# Patient Record
Sex: FEMALE | Race: WHITE | HISPANIC OR LATINO | Employment: FULL TIME | URBAN - METROPOLITAN AREA
[De-identification: names, ages, dates, MRNs, and addresses within clinical notes are randomized per-mention and may not be internally consistent; named-entity substitution may affect disease eponyms.]

---

## 2017-01-20 ENCOUNTER — TRANSCRIBE ORDERS (OUTPATIENT)
Dept: ADMINISTRATIVE | Facility: HOSPITAL | Age: 47
End: 2017-01-20

## 2017-01-20 DIAGNOSIS — R93.89 ABNORMAL X-RAY: Primary | ICD-10-CM

## 2017-01-28 ENCOUNTER — HOSPITAL ENCOUNTER (OUTPATIENT)
Dept: CT IMAGING | Facility: HOSPITAL | Age: 47
Discharge: HOME | End: 2017-01-28
Payer: COMMERCIAL

## 2017-01-28 DIAGNOSIS — R93.89 ABNORMAL X-RAY: ICD-10-CM

## 2017-01-28 PROCEDURE — 71250 CT THORAX DX C-: CPT

## 2017-10-06 ENCOUNTER — HOSPITAL ENCOUNTER (OUTPATIENT)
Dept: ULTRASOUND IMAGING | Facility: HOSPITAL | Age: 47
Discharge: HOME | End: 2017-10-06
Attending: OBSTETRICS & GYNECOLOGY
Payer: COMMERCIAL

## 2017-10-06 DIAGNOSIS — N93.9 ABNORMAL UTERINE BLEEDING: ICD-10-CM

## 2017-10-06 PROCEDURE — 76830 TRANSVAGINAL US NON-OB: CPT

## 2017-10-06 PROCEDURE — 76856 US EXAM PELVIC COMPLETE: CPT

## 2017-10-11 ENCOUNTER — GENERIC CONVERSION - ENCOUNTER (OUTPATIENT)
Dept: OTHER | Facility: OTHER | Age: 47
End: 2017-10-11

## 2017-10-19 ENCOUNTER — GENERIC CONVERSION - ENCOUNTER (OUTPATIENT)
Dept: OTHER | Facility: OTHER | Age: 47
End: 2017-10-19

## 2017-10-23 ENCOUNTER — GENERIC CONVERSION - ENCOUNTER (OUTPATIENT)
Dept: OTHER | Facility: OTHER | Age: 47
End: 2017-10-23

## 2017-10-27 ENCOUNTER — GENERIC CONVERSION - ENCOUNTER (OUTPATIENT)
Dept: OTHER | Facility: OTHER | Age: 47
End: 2017-10-27

## 2018-01-10 NOTE — MISCELLANEOUS
Message   Recorded as Task   Date: 10/10/2017 09:57 PM, Created By: Karol Butterfield   Task Name: Follow Up   Assigned To: Karol Butterfield   Regarding Patient: Kvng Noguera, Status: In Progress   OusmaneTerjorge Mcmullen - 10 Oct 2017 9:57 PM     TASK CREATED  Patient had an ultrasound, and had  no shows for results  needs to be seen for someone to review her results with her   Marsha Dumont - 11 Oct 2017 3:50 PM     TASK IN PROGRESS   Marsha Dumont - 11 Oct 2017 3:52 PM     TASK REPLIED TO: Previously Assigned To [facility] [employer] Team  Attempted to call pt [phone] to schedule appt to review results of US, no answer  Left VM in [place]  Awaiting a call back  Marsha Dumont - 18 Oct 2017 11:55 AM     TASK REPLIED TO: Previously Assigned To [facility] [employer] Team  Again called pt and left VM in [place]  Awaiting a call back  Marsha Dumont - 23 Oct 2017 10:08 AM     TASK REPLIED TO: Previously Assigned To [facility] [employer] Team  Pt coming in for f/u appt to discuss US results on 10/27/17 at 08:30 and annual scheduled for 11/2/17 at 2:15  Active Problems    1  Abnormal uterine bleeding (626 9) (N93 9)   2  Anxiety (300 00) (F41 9)   3  Chest pain (786 50) (R07 9)   4  Constipation (564 00) (K59 00)   5  Dysuria (788 1) (R30 0)   6  Encounter for routine gynecological examination (V72 31) (Z01 419)   7  Encounter for screening mammogram for malignant neoplasm of breast (V76 12)   (Z12 31)   8  Esophageal reflux (530 81) (K21 9)   9  Fibroid, uterine (218 9) (D25 9)   10  Menorrhagia (626 2) (N92 0)   11  Obesity (278 00) (E66 9)   12  Screen for STD (sexually transmitted disease) (V74 5) (Z11 3)   13  FRANKLIN (stress urinary incontinence, female) (625 6) (N39 3)   14  Vaginal candidiasis (112 1) (B37 3)   15  Well adult on routine health check (V70 0) (Z00 00)    Current Meds   1  Fluconazole 150 MG Oral Tablet; TAKE 1 TABLET NOW, AND REPEAT IN 4 DAYS;    Therapy: 84TTH5776 to (Evaluate:72Run1088) Requested for: 94UXV8103; Last   Rx:[id number] Ordered   2  Low-Ogestrel 0 3-30 MG-MCG Oral Tablet; TAKE 1 TABLET DAILY; Therapy: 05STA0854 to (Evaluate:94Evh5641)  Requested for: 72WND5553; Last   Rx:56Vml6695 Ordered   3  Vitron C 125 MG TABS; Therapy: (Recorded:57Gtl1299) to Recorded    Allergies    1  Aspirin Buffered TABS    2  No Known Environmental Allergies   3   No Known Food Allergies    Signatures   Electronically signed by : Cristina Wolfe RN; Oct 23 2017 10:10AM EST                       (Author)

## 2018-01-11 NOTE — MISCELLANEOUS
Message  Attempted to call pt [phone] to schedule appt to review results of US, no answer  Left VM in Turkish  Awaiting a call back  Active Problems    1  Abnormal uterine bleeding (626 9) (N93 9)   2  Anxiety (300 00) (F41 9)   3  Chest pain (786 50) (R07 9)   4  Constipation (564 00) (K59 00)   5  Dysuria (788 1) (R30 0)   6  Encounter for routine gynecological examination (V72 31) (Z01 419)   7  Encounter for screening mammogram for malignant neoplasm of breast (V76 12)   (Z12 31)   8  Esophageal reflux (530 81) (K21 9)   9  Fibroid, uterine (218 9) (D25 9)   10  Menorrhagia (626 2) (N92 0)   11  Obesity (278 00) (E66 9)   12  Screen for STD (sexually transmitted disease) (V74 5) (Z11 3)   13  FRANKLIN (stress urinary incontinence, female) (625 6) (N39 3)   14  Vaginal candidiasis (112 1) (B37 3)   15  Well adult on routine health check (V70 0) (Z00 00)    Current Meds   1  Fluconazole 150 MG Oral Tablet; TAKE 1 TABLET NOW, AND REPEAT IN 4 DAYS; Therapy: 88PCO6574 to (Evaluate:59Vkw8608)  Requested for: 41DVE7711; Last   Rx:[id number] Ordered   2  Low-Ogestrel 0 3-30 MG-MCG Oral Tablet; TAKE 1 TABLET DAILY; Therapy: 61WEJ1530 to (Evaluate:38Enq5807)  Requested for: 56GNX0412; Last   Rx:64Rhs5685 Ordered   3  Vitron C 125 MG TABS; Therapy: (Recorded:70Kjb9659) to Recorded    Allergies    1  Aspirin Buffered TABS    2  No Known Environmental Allergies   3   No Known Food Allergies    Signatures   Electronically signed by : Kimmie Arthur RN; Oct 11 2017  3:52PM EST                       (Author)

## 2018-01-15 NOTE — RESULT NOTES
Verified Results  * US PELVIS COMPLETE (TRANSABDOMINAL AND TRANSVAGINAL) [id number] 01:25PM Dwana Frankel     Test Name Result Flag Reference   US PELVIS COMPLETE (TRANSABDOMINAL AND TRANSVAGINAL) (Report)     PELVIC ULTRASOUND, COMPLETE     INDICATION: Abnormal uterine bleeding, history of fibroids  COMPARISON: Pelvic ultrasound 9/3/2016     TECHNIQUE:  Transabdominal pelvic ultrasound was performed in sagittal and transverse planes with a curvilinear transducer  Additional transvaginal imaging was performed to better evaluate the endometrium and ovaries  Imaging included volumetric    sweeps as well as traditional still imaging technique  FINDINGS:     UTERUS:   The uterus is retroverted in position, measuring 8 6 x 5 9 x 6 6 cm  Contour and echotexture appear normal  1 8 x 0 9 x 2 0 cm intramural fundal fibroid  5 3 x 4 4 x 5 0 anterior, partially exophytic uterine body previously estimated at 4 2 x 4 2 x 4 6 cm  Part of this difference could be related to interobserver variability  The cervix shows no suspicious abnormality  7 mm complex nabothian cyst or fibroid noted in the lower uterine segment, former favored  ENDOMETRIUM:    Normal caliber of 9 mm  Homogenous and normal in appearance  OVARIES/ADNEXA:   Right ovary: 3 0 x 2 4 x 2 6 cm  Probable corpus luteum  Doppler flow within normal limits  Left ovary: 1 8 x 1 7 x 1 9 cm  No suspicious left ovarian abnormality  Doppler flow within normal limits  No suspicious adnexal mass or loculated collections  There is no free fluid  IMPRESSION:      Uterine fibroids as above  Probable right ovarian corpus luteum            Workstation performed: [id number]     Signed by:   Nohelia Owusu DO   10/10/17

## 2018-01-22 VITALS
DIASTOLIC BLOOD PRESSURE: 79 MMHG | SYSTOLIC BLOOD PRESSURE: 114 MMHG | BODY MASS INDEX: 35.42 KG/M2 | WEIGHT: 181.38 LBS

## 2018-09-08 ENCOUNTER — APPOINTMENT (EMERGENCY)
Dept: RADIOLOGY | Facility: HOSPITAL | Age: 48
End: 2018-09-08
Payer: COMMERCIAL

## 2018-09-08 ENCOUNTER — HOSPITAL ENCOUNTER (EMERGENCY)
Facility: HOSPITAL | Age: 48
Discharge: HOME | End: 2018-09-08
Attending: EMERGENCY MEDICINE | Admitting: EMERGENCY MEDICINE
Payer: COMMERCIAL

## 2018-09-08 VITALS
DIASTOLIC BLOOD PRESSURE: 71 MMHG | BODY MASS INDEX: 37.07 KG/M2 | WEIGHT: 189.82 LBS | SYSTOLIC BLOOD PRESSURE: 112 MMHG | TEMPERATURE: 98 F | RESPIRATION RATE: 17 BRPM | HEART RATE: 75 BPM | OXYGEN SATURATION: 100 %

## 2018-09-08 DIAGNOSIS — R07.9 CHEST PAIN, UNSPECIFIED TYPE: Primary | ICD-10-CM

## 2018-09-08 LAB
BASOPHILS # BLD AUTO: 0.06 THOUSANDS/ΜL (ref 0–0.1)
BASOPHILS NFR BLD AUTO: 1 % (ref 0–1)
EOSINOPHIL # BLD AUTO: 0.13 THOUSAND/ΜL (ref 0–0.61)
EOSINOPHIL NFR BLD AUTO: 1 % (ref 0–6)
ERYTHROCYTE [DISTWIDTH] IN BLOOD BY AUTOMATED COUNT: 15.5 % (ref 11.6–15.1)
HCT VFR BLD AUTO: 36.9 % (ref 34.8–46.1)
HGB BLD-MCNC: 11.8 G/DL (ref 11.5–15.4)
IMM GRANULOCYTES # BLD AUTO: 0.04 THOUSAND/UL (ref 0–0.2)
IMM GRANULOCYTES NFR BLD AUTO: 0 % (ref 0–2)
LYMPHOCYTES # BLD AUTO: 2.03 THOUSANDS/ΜL (ref 0.6–4.47)
LYMPHOCYTES NFR BLD AUTO: 22 % (ref 14–44)
MCH RBC QN AUTO: 25.2 PG (ref 26.8–34.3)
MCHC RBC AUTO-ENTMCNC: 32 G/DL (ref 31.4–37.4)
MCV RBC AUTO: 79 FL (ref 82–98)
MONOCYTES # BLD AUTO: 0.35 THOUSAND/ΜL (ref 0.17–1.22)
MONOCYTES NFR BLD AUTO: 4 % (ref 4–12)
NEUTROPHILS # BLD AUTO: 6.64 THOUSANDS/ΜL (ref 1.85–7.62)
NEUTS SEG NFR BLD AUTO: 72 % (ref 43–75)
NRBC BLD AUTO-RTO: 0 /100 WBCS
PLATELET # BLD AUTO: 446 THOUSANDS/UL (ref 149–390)
PMV BLD AUTO: 9.9 FL (ref 8.9–12.7)
RBC # BLD AUTO: 4.69 MILLION/UL (ref 3.81–5.12)
TROPONIN I SERPL-MCNC: <0.02 NG/ML
TROPONIN I SERPL-MCNC: <0.02 NG/ML
WBC # BLD AUTO: 9.25 THOUSAND/UL (ref 4.31–10.16)

## 2018-09-08 PROCEDURE — 36415 COLL VENOUS BLD VENIPUNCTURE: CPT | Performed by: EMERGENCY MEDICINE

## 2018-09-08 PROCEDURE — 84484 ASSAY OF TROPONIN QUANT: CPT | Performed by: EMERGENCY MEDICINE

## 2018-09-08 PROCEDURE — 99285 EMERGENCY DEPT VISIT HI MDM: CPT

## 2018-09-08 PROCEDURE — 85025 COMPLETE CBC W/AUTO DIFF WBC: CPT | Performed by: EMERGENCY MEDICINE

## 2018-09-08 PROCEDURE — 93005 ELECTROCARDIOGRAM TRACING: CPT

## 2018-09-08 PROCEDURE — 71045 X-RAY EXAM CHEST 1 VIEW: CPT

## 2018-09-08 RX ORDER — MORPHINE SULFATE 15 MG/1
15 TABLET ORAL EVERY 4 HOURS PRN
Status: DISCONTINUED | OUTPATIENT
Start: 2018-09-08 | End: 2018-09-08 | Stop reason: HOSPADM

## 2018-09-08 RX ADMIN — MORPHINE SULFATE 15 MG: 15 TABLET ORAL at 10:47

## 2018-09-08 NOTE — ED PROCEDURE NOTE
PROCEDURE  ECG 12 Lead Documentation  Date/Time: 9/8/2018 11:05 AM  Performed by: Enrico Serrano  Authorized by: Enrico Serrano     Indications / Diagnosis:  Cp/sob/ left arm pain  ECG reviewed by me, the ED Provider: yes    Patient location:  ED and bedside  Previous ECG:     Previous ECG:  Unavailable    Comparison to cardiac monitor: Yes    Interpretation:     Interpretation: non-specific    Rate:     ECG rate:  82    ECG rate assessment: normal    Rhythm:     Rhythm: sinus rhythm    Ectopy:     Ectopy: none    QRS:     QRS axis:  Normal    QRS intervals:  Normal  Conduction:     Conduction: abnormal      Abnormal conduction: incomplete RBBB    ST segments:     ST segments:  Normal  T waves:     T waves: flattening      Flattening:  III and V1  Other findings:     Other findings: U wave    Comments:      No ecg signs of ischemia/ injury /  jaqui/pericarditis         Karlos Mcguire MD  09/08/18 4872

## 2018-09-08 NOTE — ED PROVIDER NOTES
History  Chief Complaint   Patient presents with    Chest Pain     pt c/o chest pain that started at 7:00 am this morning  pt c/o pain that radiates to left arm and left leg with new onset of SOB  pt denies cardiac hx       47 YR FEMALE - THRU DAUGHTER HOW PT WANTS TO TRANSLATE- WITH 3 HRS OF SUDDEN  ANTERIOR SHARP TO PRESSURE CP - WITH RADIATION TO BACK AND TO LEFT ARM /LEG- -- NO FEVERS/URI/COUGH - NO ABRUPT ONSET OF RIPPING/TEARING TYPE PAIN --  MILD SOB- NO HX OF VTE- NO N/V- NO DIAPHORESIS-- NO INJURY / RASH ON AREA- NO ABD PAIN/GERD/DYSPEPSIA/ BILIARY COLIC TYPE PAIN - OR MELENA        History provided by:  Patient and relative   used: Yes    Chest Pain   Associated symptoms: shortness of breath    Associated symptoms: no cough and no palpitations        Prior to Admission Medications   Prescriptions Last Dose Informant Patient Reported? Taking?   promethazine-codeine (PHENERGAN WITH CODEINE) 6 25-10 mg/5 mL syrup   No No   Si-10 mL by mouth every 6 hours as needed for cough      Facility-Administered Medications: None       No past medical history on file  Past Surgical History:   Procedure Laterality Date    CHOLECYSTECTOMY         No family history on file  I have reviewed and agree with the history as documented  Social History   Substance Use Topics    Smoking status: Never Smoker    Smokeless tobacco: Not on file    Alcohol use Not on file        Review of Systems   Constitutional: Negative  HENT: Negative  Eyes: Negative  Respiratory: Positive for shortness of breath  Negative for apnea, cough, choking, chest tightness, wheezing and stridor  Cardiovascular: Positive for chest pain  Negative for palpitations and leg swelling  Gastrointestinal: Negative  Endocrine: Negative  Genitourinary: Negative  Musculoskeletal: Negative  Skin: Negative  Allergic/Immunologic: Negative  Neurological: Negative  Hematological: Negative  Psychiatric/Behavioral: Negative  Physical Exam  Physical Exam   Constitutional: She is oriented to person, place, and time  She appears well-developed and well-nourished  No distress  AVSS-- PULSE OX  99 % ON RA- INTERPRETATION IS NORMAL- NO INTERVENTION - NON MARFANOID BODY HABITUS   HENT:   Head: Normocephalic and atraumatic  Eyes: Conjunctivae and EOM are normal  Pupils are equal, round, and reactive to light  Right eye exhibits no discharge  Left eye exhibits no discharge  No scleral icterus  MM PINK   Neck: Normal range of motion  Neck supple  No JVD present  No tracheal deviation present  No thyromegaly present  Cardiovascular: Normal rate, regular rhythm, normal heart sounds and intact distal pulses  Exam reveals no gallop and no friction rub  No murmur heard  Pulmonary/Chest: Effort normal and breath sounds normal  No stridor  No respiratory distress  She has no wheezes  She has no rales  She exhibits no tenderness  Abdominal: Soft  Bowel sounds are normal  She exhibits no distension and no mass  There is no tenderness  There is no rebound and no guarding  No hernia  VERY SOFT- NT- NO PERITONEAL SIGNS- NO CVA TENDERNESS   Musculoskeletal: Normal range of motion  She exhibits no edema, tenderness or deformity  EQUAL BILATERAL RADIAL/DP PULSES- NO BLE EDEMA/CALF TENDERNESS/ASSYM/ ERYTHEMA   Lymphadenopathy:     She has no cervical adenopathy  Neurological: She is alert and oriented to person, place, and time  No cranial nerve deficit or sensory deficit  She exhibits normal muscle tone  Coordination normal    Skin: Skin is warm  Capillary refill takes less than 2 seconds  No rash noted  She is not diaphoretic  No erythema  No pallor  Psychiatric: She has a normal mood and affect  Her behavior is normal  Judgment and thought content normal    Nursing note and vitals reviewed        Vital Signs  ED Triage Vitals   Temperature Pulse Respirations Blood Pressure SpO2   09/08/18 1008 09/08/18 1008 09/08/18 1006 09/08/18 1008 09/08/18 1006   98 °F (36 7 °C) 93 20 (!) 177/74 100 %      Temp Source Heart Rate Source Patient Position - Orthostatic VS BP Location FiO2 (%)   09/08/18 1008 09/08/18 1008 09/08/18 1008 09/08/18 1008 --   Oral Monitor Lying Right arm       Pain Score       09/08/18 1006       8           Vitals:    09/08/18 1028 09/08/18 1030 09/08/18 1045 09/08/18 1100   BP: 127/76 127/76 128/71 138/77   Pulse: 84 74 70 68   Patient Position - Orthostatic VS: Lying Lying Lying Lying       Visual Acuity      ED Medications  Medications   morphine (MSIR) IR tablet 15 mg (15 mg Oral Given 9/8/18 1047)       Diagnostic Studies  Results Reviewed     Procedure Component Value Units Date/Time    CBC and differential [[id number]]  (Abnormal) Collected:  09/08/18 1042    Lab Status:  Final result Specimen:  Blood from Arm, Right Updated:  09/08/18 1049     WBC 9 25 Thousand/uL      RBC 4 69 Million/uL      Hemoglobin 11 8 g/dL      Hematocrit 36 9 %      MCV 79 (L) fL      MCH 25 2 (L) pg      MCHC 32 0 g/dL      RDW 15 5 (H) %      MPV 9 9 fL      Platelets 500 (H) Thousands/uL      nRBC 0 /100 WBCs      Neutrophils Relative 72 %      Immat GRANS % 0 %      Lymphocytes Relative 22 %      Monocytes Relative 4 %      Eosinophils Relative 1 %      Basophils Relative 1 %      Neutrophils Absolute 6 64 Thousands/µL      Immature Grans Absolute 0 04 Thousand/uL      Lymphocytes Absolute 2 03 Thousands/µL      Monocytes Absolute 0 35 Thousand/µL      Eosinophils Absolute 0 13 Thousand/µL      Basophils Absolute 0 06 Thousands/µL     Troponin I [[id number]] Collected:  09/08/18 1042    Lab Status:   In process Specimen:  Blood from Arm, Right Updated:  09/08/18 1046                 XR chest 1 view portable    (Results Pending)              Procedures  Procedures       Phone Contacts  ED Phone Contact    ED Course  ED Course as of Sep 08 1204   Sat Sep 08, 2018   1100 -- note- pt states has hx of anemia-- will check  cbc    1100 Er md medical decision making note- pt is low risk for pe by well's score- score of 0- perc-neg    1122 Cxr portable- compared to previous from 4/29/16- no sign changes- no change in mediastinum/ cardiac silhouette no free/sq air- no infiltrate/ ptx/ pulm edema/ pleural effusions    1134 - er md note- pt - re-evaluated- feels improved with symptoms- no new symptoms- aware of neg results and repeat testing at 1;30 pm -                                 [facility]  The patient presented with a condition in which there was a high probability of imminent or life-threatening deterioration, and critical care services (excluding separately billable procedures) totalled 30-74 minutes  Disposition  Final diagnoses:   None     ED Disposition     None      Follow-up Information    None         Patient's Medications   Discharge Prescriptions    No medications on file     No discharge procedures on file      ED Provider  Electronically Signed by           Dayo Tong MD  09/08/18 2368

## 2018-09-08 NOTE — DISCHARGE INSTRUCTIONS
DIAGNOSIS; CHEST PAIN     - ACTIVITY AS TOLERATED     - BY OUR NEGATIVE ER WORKUP FOR CHEST PAIN - YOU ARE A LOW RISK CHEST PAIN PATIENT- APPROXIMATELY 1 OUT  ER CHEST PAIN PATIENTS LIKE YOU WILL GO ON TO HAVE A HEART ATTACK AT ONE MONTH     - THERE IS NO SUCH THING AS A NO RISK CHEST PAIN PAIENT    - PLEASE RETURN TO  THE ER FOR ANY NEW/ WORSENING/CONCERNING SYMPTOMS TO YOU     - PLEASE CALL YOUR PRIMARY DOCTOR ON Monday TO SCHEDULE AN APPOINTMENT FOR A RECHECK NEXT WEEK

## 2018-09-08 NOTE — ED PROCEDURE NOTE
PROCEDURE  ECG 12 Lead Documentation  Date/Time: 9/8/2018 4:41 PM  Performed by: Alfred Adkins  Authorized by: Rudi BURKS     Indications / Diagnosis:  ER ECG # 2   ECG reviewed by me, the ED Provider: yes    Patient location:  ED and bedside  Previous ECG:     Previous ECG:  Compared to current    Comparison ECG info:  COMPARED TO INITIAL ER ECG- NO SIGN CHANGES    Similarity:  No change    Comparison to cardiac monitor: Yes    Interpretation:     Interpretation: non-specific    Rate:     ECG rate:  70    ECG rate assessment: normal    Rhythm:     Rhythm: sinus rhythm    Ectopy:     Ectopy: none    QRS:     QRS axis:  Normal    QRS intervals:  Normal  Conduction:     Conduction: abnormal      Abnormal conduction: incomplete RBBB    ST segments:     ST segments:  Normal  T waves:     T waves: flattening      Flattening:  III and V1  Other findings:     Other findings: U wave    Comments:      NO SIGNS OF ISCHEMIA/ INJURY / Prachi Riley MD  09/08/18 9135

## 2018-09-08 NOTE — ED PROCEDURE NOTE
PROCEDURE  CriticalCare Time  Performed by: Deb Cowart  Authorized by: Deb Cowart     Critical care provider statement:     Critical care time (minutes):  35    Critical care start time:  9/8/2018 12:05 PM    Critical care end time:  9/8/2018 12:40 PM    Critical care time was exclusive of:  Separately billable procedures and treating other patients and teaching time    Critical care was necessary to treat or prevent imminent or life-threatening deterioration of the following conditions: CHEST PAIN ADP      Critical care was time spent personally by me on the following activities:  Examination of patient, development of treatment plan with patient or surrogate, review of old charts, re-evaluation of patient's condition, ordering and review of radiographic studies and ordering and review of laboratory studies    I assumed direction of critical care for this patient from another provider in my specialty: clay Rodriguez MD  09/08/18 7720

## 2018-09-10 LAB
ATRIAL RATE: 70 BPM
ATRIAL RATE: 82 BPM
P AXIS: 17 DEGREES
P AXIS: 24 DEGREES
PR INTERVAL: 140 MS
PR INTERVAL: 142 MS
QRS AXIS: -17 DEGREES
QRS AXIS: -24 DEGREES
QRSD INTERVAL: 92 MS
QRSD INTERVAL: 96 MS
QT INTERVAL: 350 MS
QT INTERVAL: 380 MS
QTC INTERVAL: 408 MS
QTC INTERVAL: 410 MS
T WAVE AXIS: 27 DEGREES
T WAVE AXIS: 28 DEGREES
VENTRICULAR RATE: 70 BPM
VENTRICULAR RATE: 82 BPM

## 2018-09-10 PROCEDURE — 93010 ELECTROCARDIOGRAM REPORT: CPT | Performed by: INTERNAL MEDICINE

## 2019-01-10 ENCOUNTER — HOSPITAL ENCOUNTER (EMERGENCY)
Facility: HOSPITAL | Age: 49
Discharge: HOME | End: 2019-01-10
Attending: EMERGENCY MEDICINE
Payer: COMMERCIAL

## 2019-01-10 ENCOUNTER — APPOINTMENT (EMERGENCY)
Dept: RADIOLOGY | Facility: HOSPITAL | Age: 49
End: 2019-01-10
Payer: COMMERCIAL

## 2019-01-10 VITALS
OXYGEN SATURATION: 97 % | HEART RATE: 70 BPM | RESPIRATION RATE: 18 BRPM | SYSTOLIC BLOOD PRESSURE: 127 MMHG | DIASTOLIC BLOOD PRESSURE: 63 MMHG | TEMPERATURE: 98.5 F

## 2019-01-10 DIAGNOSIS — R42 VERTIGO: ICD-10-CM

## 2019-01-10 DIAGNOSIS — R51.9 HEADACHE: Primary | ICD-10-CM

## 2019-01-10 LAB
ALBUMIN SERPL BCP-MCNC: 3.4 G/DL (ref 3.5–5)
ALP SERPL-CCNC: 89 U/L (ref 46–116)
ALT SERPL W P-5'-P-CCNC: 18 U/L (ref 12–78)
ANION GAP SERPL CALCULATED.3IONS-SCNC: 6 MMOL/L (ref 4–13)
AST SERPL W P-5'-P-CCNC: 10 U/L (ref 5–45)
ATRIAL RATE: 69 BPM
BASOPHILS # BLD AUTO: 0.05 THOUSANDS/ΜL (ref 0–0.1)
BASOPHILS NFR BLD AUTO: 1 % (ref 0–1)
BILIRUB SERPL-MCNC: 0.26 MG/DL (ref 0.2–1)
BUN SERPL-MCNC: 10 MG/DL (ref 5–25)
CALCIUM SERPL-MCNC: 8.6 MG/DL (ref 8.3–10.1)
CHLORIDE SERPL-SCNC: 107 MMOL/L (ref 100–108)
CO2 SERPL-SCNC: 25 MMOL/L (ref 21–32)
CREAT SERPL-MCNC: 0.58 MG/DL (ref 0.6–1.3)
EOSINOPHIL # BLD AUTO: 0.11 THOUSAND/ΜL (ref 0–0.61)
EOSINOPHIL NFR BLD AUTO: 1 % (ref 0–6)
ERYTHROCYTE [DISTWIDTH] IN BLOOD BY AUTOMATED COUNT: 15.6 % (ref 11.6–15.1)
GFR SERPL CREATININE-BSD FRML MDRD: 109 ML/MIN/1.73SQ M
GLUCOSE SERPL-MCNC: 90 MG/DL (ref 65–140)
HCT VFR BLD AUTO: 35.4 % (ref 34.8–46.1)
HGB BLD-MCNC: 11.1 G/DL (ref 11.5–15.4)
HOLD SPECIMEN: NORMAL
IMM GRANULOCYTES # BLD AUTO: 0.03 THOUSAND/UL (ref 0–0.2)
IMM GRANULOCYTES NFR BLD AUTO: 0 % (ref 0–2)
LIPASE SERPL-CCNC: 159 U/L (ref 73–393)
LYMPHOCYTES # BLD AUTO: 1.96 THOUSANDS/ΜL (ref 0.6–4.47)
LYMPHOCYTES NFR BLD AUTO: 19 % (ref 14–44)
MCH RBC QN AUTO: 24.4 PG (ref 26.8–34.3)
MCHC RBC AUTO-ENTMCNC: 31.4 G/DL (ref 31.4–37.4)
MCV RBC AUTO: 78 FL (ref 82–98)
MONOCYTES # BLD AUTO: 0.52 THOUSAND/ΜL (ref 0.17–1.22)
MONOCYTES NFR BLD AUTO: 5 % (ref 4–12)
NEUTROPHILS # BLD AUTO: 7.91 THOUSANDS/ΜL (ref 1.85–7.62)
NEUTS SEG NFR BLD AUTO: 74 % (ref 43–75)
NRBC BLD AUTO-RTO: 0 /100 WBCS
P AXIS: 32 DEGREES
PLATELET # BLD AUTO: 354 THOUSANDS/UL (ref 149–390)
PMV BLD AUTO: 9.8 FL (ref 8.9–12.7)
POTASSIUM SERPL-SCNC: 4 MMOL/L (ref 3.5–5.3)
PR INTERVAL: 144 MS
PROT SERPL-MCNC: 7.7 G/DL (ref 6.4–8.2)
QRS AXIS: -15 DEGREES
QRSD INTERVAL: 94 MS
QT INTERVAL: 398 MS
QTC INTERVAL: 426 MS
RBC # BLD AUTO: 4.55 MILLION/UL (ref 3.81–5.12)
SODIUM SERPL-SCNC: 138 MMOL/L (ref 136–145)
T WAVE AXIS: 14 DEGREES
TROPONIN I SERPL-MCNC: <0.02 NG/ML
VENTRICULAR RATE: 69 BPM
WBC # BLD AUTO: 10.58 THOUSAND/UL (ref 4.31–10.16)

## 2019-01-10 PROCEDURE — 70498 CT ANGIOGRAPHY NECK: CPT

## 2019-01-10 PROCEDURE — 93005 ELECTROCARDIOGRAM TRACING: CPT

## 2019-01-10 PROCEDURE — 83690 ASSAY OF LIPASE: CPT | Performed by: EMERGENCY MEDICINE

## 2019-01-10 PROCEDURE — 99284 EMERGENCY DEPT VISIT MOD MDM: CPT

## 2019-01-10 PROCEDURE — 70496 CT ANGIOGRAPHY HEAD: CPT

## 2019-01-10 PROCEDURE — 93010 ELECTROCARDIOGRAM REPORT: CPT | Performed by: INTERNAL MEDICINE

## 2019-01-10 PROCEDURE — 96374 THER/PROPH/DIAG INJ IV PUSH: CPT

## 2019-01-10 PROCEDURE — 85025 COMPLETE CBC W/AUTO DIFF WBC: CPT | Performed by: EMERGENCY MEDICINE

## 2019-01-10 PROCEDURE — 96375 TX/PRO/DX INJ NEW DRUG ADDON: CPT

## 2019-01-10 PROCEDURE — 96361 HYDRATE IV INFUSION ADD-ON: CPT

## 2019-01-10 PROCEDURE — 84484 ASSAY OF TROPONIN QUANT: CPT | Performed by: EMERGENCY MEDICINE

## 2019-01-10 PROCEDURE — 36415 COLL VENOUS BLD VENIPUNCTURE: CPT | Performed by: EMERGENCY MEDICINE

## 2019-01-10 PROCEDURE — 80053 COMPREHEN METABOLIC PANEL: CPT | Performed by: EMERGENCY MEDICINE

## 2019-01-10 RX ORDER — MECLIZINE HCL 12.5 MG/1
12.5 TABLET ORAL ONCE
Status: COMPLETED | OUTPATIENT
Start: 2019-01-10 | End: 2019-01-10

## 2019-01-10 RX ORDER — IBUPROFEN 200 MG
TABLET ORAL EVERY 6 HOURS PRN
COMMUNITY
End: 2020-01-16

## 2019-01-10 RX ORDER — ACETAMINOPHEN 325 MG/1
975 TABLET ORAL ONCE
Status: COMPLETED | OUTPATIENT
Start: 2019-01-10 | End: 2019-01-10

## 2019-01-10 RX ORDER — METOCLOPRAMIDE HYDROCHLORIDE 5 MG/ML
10 INJECTION INTRAMUSCULAR; INTRAVENOUS ONCE
Status: COMPLETED | OUTPATIENT
Start: 2019-01-10 | End: 2019-01-10

## 2019-01-10 RX ORDER — MECLIZINE HCL 12.5 MG/1
12.5 TABLET ORAL 3 TIMES DAILY PRN
Qty: 15 TABLET | Refills: 0 | Status: SHIPPED | OUTPATIENT
Start: 2019-01-10 | End: 2020-01-16

## 2019-01-10 RX ORDER — DIPHENHYDRAMINE HYDROCHLORIDE 50 MG/ML
25 INJECTION INTRAMUSCULAR; INTRAVENOUS ONCE
Status: COMPLETED | OUTPATIENT
Start: 2019-01-10 | End: 2019-01-10

## 2019-01-10 RX ORDER — KETOROLAC TROMETHAMINE 30 MG/ML
15 INJECTION, SOLUTION INTRAMUSCULAR; INTRAVENOUS ONCE
Status: COMPLETED | OUTPATIENT
Start: 2019-01-10 | End: 2019-01-10

## 2019-01-10 RX ADMIN — ACETAMINOPHEN 975 MG: 325 TABLET, FILM COATED ORAL at 13:26

## 2019-01-10 RX ADMIN — DIPHENHYDRAMINE HYDROCHLORIDE 25 MG: 50 INJECTION INTRAMUSCULAR; INTRAVENOUS at 12:51

## 2019-01-10 RX ADMIN — MECLIZINE HCL 12.5 MG 12.5 MG: 12.5 TABLET ORAL at 16:45

## 2019-01-10 RX ADMIN — METOCLOPRAMIDE 10 MG: 5 INJECTION, SOLUTION INTRAMUSCULAR; INTRAVENOUS at 12:51

## 2019-01-10 RX ADMIN — SODIUM CHLORIDE 1000 ML: 0.9 INJECTION, SOLUTION INTRAVENOUS at 12:51

## 2019-01-10 RX ADMIN — KETOROLAC TROMETHAMINE 15 MG: 30 INJECTION, SOLUTION INTRAMUSCULAR at 16:45

## 2019-01-10 RX ADMIN — IOHEXOL 80 ML: 350 INJECTION, SOLUTION INTRAVENOUS at 15:22

## 2019-01-10 NOTE — ED PROVIDER NOTES
ASSESSMENT AND PLAN    Julieta Gonsalez is a 50 y o  female with a history of hyperlipidemia, possible BPPV, who presents with 2 days of headache and vertiginous symptoms, associated with nausea  On arrival, the patient is hemodynamically stable and well-appearing without acute distress, with a nontoxic appearance  On exam, the patient has a fatigable leftward being nystagmus, but otherwise unremarkable HINTS exam   The patient's neurologic exam is otherwise unremarkable  The rest the patient's physical exam is benign  -lab work unremarkable  -EKG not ischemic  -CTA of the head neck negative for intracranial lesion to explain the patient's symptoms  -will give Reglan, Benadryl, Toradol for headache  -meclizine for treatment of the patient's vertigo  -on re-evaluation, the patient's physical exam is improved  She was able to ambulate without difficulty several times the emergency department to go to the bathroom  She states she feels comfortable, and request discharge home   -plan for discharge home  Strict return precautions provided  Provided prescription for meclizine p r n  Recommended outpatient follow up with her primary care physician for re-evaluation    History  Chief Complaint   Patient presents with    Headache     Pt presents with HA and back pain x2 days  This is a 77-year-old female who presents with headache x2 days  Also endorses "dizziness", which she states is present when sitting, and also while standing, intermittent in nature generally lasting several minutes before resolving spontaneously  Headache was gradual in onset  Described as is left-sided "fullness", located over the parietal aspect of the skull  Throbbing  Patient endorses Nausea, but no vomiting  States she has had multiple headaches in the past, however did not feel quite like this, and generally her headaches improved with ibuprofen over-the-counter, and this 1 has not improved    Patient also states she has had multiple episodes of dizziness in the past, however not associated with a headache like this episode  She endorses "heaviness", of the bilateral lower extremities, as well as difficulty walking due to her symptoms  Denies any history of CV A  Denies any recent head strike or trauma  Prior to Admission Medications   Prescriptions Last Dose Informant Patient Reported? Taking?   ibuprofen (MOTRIN) 200 mg tablet   Yes Yes   Sig: Take by mouth every 6 (six) hours as needed for mild pain   promethazine-codeine (PHENERGAN WITH CODEINE) 6 25-10 mg/5 mL syrup Not Taking at Unknown time  No No   Si-10 mL by mouth every 6 hours as needed for cough   Patient not taking: Reported on 1/10/2019       Facility-Administered Medications: None       Past Medical History:   Diagnosis Date    Anemia     Pneumonia        Past Surgical History:   Procedure Laterality Date    CHOLECYSTECTOMY         Family History   Problem Relation Age of Onset    Hyperlipidemia Mother     No Known Problems Father      I have reviewed and agree with the history as documented  Social History   Substance Use Topics    Smoking status: Never Smoker    Smokeless tobacco: Not on file    Alcohol use No        Review of Systems   Constitutional: Negative for chills and fever  HENT: Negative for congestion and rhinorrhea  Eyes: Negative for photophobia and visual disturbance  Respiratory: Negative for cough and shortness of breath  Cardiovascular: Negative for chest pain and palpitations  Gastrointestinal: Negative for abdominal pain, diarrhea, nausea and vomiting  Genitourinary: Negative for dysuria and frequency  Musculoskeletal: Negative for neck pain and neck stiffness  Skin: Negative for pallor and rash  Neurological: Positive for dizziness, weakness (Bilateral lower extremity) and headaches  Negative for tremors, syncope, light-headedness and numbness     All other systems reviewed and are negative  Physical Exam  ED Triage Vitals   Temperature Pulse Respirations Blood Pressure SpO2   01/10/19 1158 01/10/19 1158 01/10/19 1158 01/10/19 1158 01/10/19 1158   98 5 °F (36 9 °C) 80 16 140/67 97 %      Temp src Heart Rate Source Patient Position - Orthostatic VS BP Location FiO2 (%)   -- -- -- -- --             Pain Score       01/10/19 1245       9           Orthostatic Vital Signs  Vitals:    01/10/19 1430 01/10/19 1500 01/10/19 1530 01/10/19 1736   BP: 116/65 118/64 130/72 127/63   Pulse: 75 72 77 70       Physical Exam   Constitutional: She is oriented to person, place, and time  Resting comfortably on stretcher  Awake alert, well-appearing, no acute distress  Nontoxic in appearance  Appears stated age   HENT:   Head: Normocephalic and atraumatic  Mouth/Throat: Oropharynx is clear and moist  No oropharyngeal exudate  There is tenderness to palpation over the left occipital condyle, as well as mild tenderness over the left-sided strap muscles  Eyes: Pupils are equal, round, and reactive to light  No scleral icterus  Bilateral, fatigable leftward beating nystagmus with extraocular motion testing  Neck: Normal range of motion  No JVD present  Cardiovascular: Normal rate, regular rhythm and normal heart sounds  No murmur heard  Pulmonary/Chest: Effort normal  No respiratory distress  She has no wheezes  She has no rales  Abdominal: Soft  She exhibits no distension  There is no tenderness  Musculoskeletal: Normal range of motion  She exhibits no edema  Neurological: She is alert and oriented to person, place, and time  She exhibits normal muscle tone  Strength is 5/5 in all extremities bilaterally, however slightly decreased in the bilateral lower extremities  Sensation is grossly intact in all extremities  No other cranial nerve deficits appreciated  Skin: Skin is warm and dry  No rash noted  No pallor         ED Medications  Medications   sodium chloride 0 9 % bolus 1,000 mL (0 mL Intravenous Stopped 1/10/19 1437)   metoclopramide (REGLAN) injection 10 mg (10 mg Intravenous Given 1/10/19 1251)   diphenhydrAMINE (BENADRYL) injection 25 mg (25 mg Intravenous Given 1/10/19 1251)   acetaminophen (TYLENOL) tablet 975 mg (975 mg Oral Given 1/10/19 1326)   iohexol (OMNIPAQUE) 350 MG/ML injection (MULTI-DOSE) 80 mL (80 mL Intravenous Given 1/10/19 1522)   meclizine (ANTIVERT) tablet 12 5 mg (12 5 mg Oral Given 1/10/19 1645)   ketorolac (TORADOL) injection 15 mg (15 mg Intravenous Given 1/10/19 1645)       Diagnostic Studies  Results Reviewed     Procedure Component Value Units Date/Time    [place] draw [[id number]] Collected:  01/10/19 1241    Lab Status:  Final result Specimen:  Blood Updated:  01/10/19 1401    Narrative: The following orders were created for panel order [place] draw  Procedure                               Abnormality         Status                     ---------                               -----------         ------                     [place] Bryon Top on CINDY[[id number]]                            Final result               Green / Yellow tube on IH[[id number]]                       Final result               Green / Black tube on KCDD[[id number]]                        Final result               [place]ender Top 3 ml on FFKB[[id number]]                        Final result                 Please view results for these tests on the individual orders      Troponin I [[id number]]  (Normal) Collected:  01/10/19 1253    Lab Status:  Final result Specimen:  Blood Updated:  01/10/19 1311     Troponin I <0 02 ng/mL     Comprehensive metabolic panel [[id number]]  (Abnormal) Collected:  01/10/19 1241    Lab Status:  Final result Specimen:  Blood from Arm, Left Updated:  01/10/19 1311     Sodium 138 mmol/L      Potassium 4 0 mmol/L      Chloride 107 mmol/L      CO2 25 mmol/L      ANION GAP 6 mmol/L      BUN 10 mg/dL      Creatinine 0 58 (L) mg/dL      Glucose 90 mg/dL      Calcium 8 6 mg/dL      AST 10 U/L      ALT 18 U/L      Alkaline Phosphatase 89 U/L      Total Protein 7 7 g/dL      Albumin 3 4 (L) g/dL      Total Bilirubin 0 26 mg/dL      eGFR 109 ml/min/1 73sq m     Narrative:         National Kidney Disease Education Program recommendations are as follows:  GFR calculation is accurate only with a steady state creatinine  Chronic Kidney disease less than 60 ml/min/1 73 sq  meters  Kidney failure less than 15 ml/min/1 73 sq  meters  Lipase [[id number]]  (Normal) Collected:  01/10/19 1241    Lab Status:  Final result Specimen:  Blood from Arm, Left Updated:  01/10/19 1305     Lipase 159 u/L     CBC and differential [[id number]]  (Abnormal) Collected:  01/10/19 1241    Lab Status:  Final result Specimen:  Blood from Arm, Left Updated:  01/10/19 1252     WBC 10 58 (H) Thousand/uL      RBC 4 55 Million/uL      Hemoglobin 11 1 (L) g/dL      Hematocrit 35 4 %      MCV 78 (L) fL      MCH 24 4 (L) pg      MCHC 31 4 g/dL      RDW 15 6 (H) %      MPV 9 8 fL      Platelets 206 Thousands/uL      nRBC 0 /100 WBCs      Neutrophils Relative 74 %      Immat GRANS % 0 %      Lymphocytes Relative 19 %      Monocytes Relative 5 %      Eosinophils Relative 1 %      Basophils Relative 1 %      Neutrophils Absolute 7 91 (H) Thousands/µL      Immature Grans Absolute 0 03 Thousand/uL      Lymphocytes Absolute 1 96 Thousands/µL      Monocytes Absolute 0 52 Thousand/µL      Eosinophils Absolute 0 11 Thousand/µL      Basophils Absolute 0 05 Thousands/µL                  CTA head and neck with and without contrast   Final Result by Tenny Sever, MD (01/10 1547)      No acute intracranial disease  No large vessel flow restrictive disease within the head or neck                    Workstation performed: [id number]               Procedures  ECG 12 Lead Documentation  Date/Time: 1/10/2019 3:57 PM  Performed by: Cassius Palencia  Authorized by: Cassius Palencia     ECG reviewed by me, the ED Provider: yes    Patient location:  ED  Previous ECG:     Previous ECG:  Unavailable    Comparison to cardiac monitor: Yes    Interpretation:     Interpretation: normal    Comments:      Sinus rhythm with a heart rate 69  No ST/T-wave changes  Normal axis, normal intervals  No prior EKGs for comparison            Phone Consults  ED Phone Contact    ED Course                               [facility]  CritCare Time    Disposition  Final diagnoses:   Headache   Vertigo     Time reflects when diagnosis was documented in both MDM as applicable and the Disposition within this note     Time User Action Codes Description Comment    1/10/2019  5:37 PM Marcelle Loupe Add [R51] Headache     1/10/2019  5:37 PM Marcelle Loupe Add [R42] Vertigo       ED Disposition     ED Disposition Condition Comment    [place]  [facility] [place]self [place]  Condition at discharge: Good        Follow-up Information     Follow up With Specialties Details Why [address] Diana Anderson DO Family Medicine Call To schedule appointment for follow-up this week [address] [phone] [address]  [phone]            Discharge Medication List as of 1/10/2019  5:38 PM      START taking these medications    Details   meclizine (ANTIVERT) 12 5 MG tablet Take 1 tablet (12 5 mg total) by mouth 3 (three) times a day as needed for dizziness, Starting Thu 1/10/2019, Print         CONTINUE these medications which have NOT CHANGED    Details   ibuprofen (MOTRIN) 200 mg tablet Take by mouth every 6 (six) hours as needed for mild pain, Historical Med      promethazine-codeine (PHENERGAN WITH CODEINE) 6 25-10 mg/5 mL syrup 5-10 mL by mouth every 6 hours as needed for cough, Print           No discharge procedures on file  ED Provider  Attending physically available and evaluated [facility] [place]  I managed the patient along with the ED Attending      Electronically Signed by         Salinas Boykin MD  01/11/19 4018

## 2019-01-10 NOTE — ED ATTENDING ATTESTATION
Garth Redmond MD, saw and evaluated the patient  I have discussed the patient with the resident/non-physician practitioner and agree with the resident's/non-physician practitioner's findings, Plan of Care, and MDM as documented in the resident's/non-physician practitioner's note, except where noted  All available labs and Radiology studies were reviewed  At this point I agree with the current assessment done in the Emergency Department  I have conducted an independent evaluation of this patient a history and physical is as follows:   The patient presents for evaluation of headache that started gradually 2 days ago is global headache she has some dizziness no visual changes no focal weakness no fever no neck pain no sick contacts in the household no vomiting no diarrhea  Exam well-appearing no acute distress vital signs stable afebrile HEENT is unremarkable no nystagmus noted tympanic membranes clear pupils equal round react to light neck supple no JVD   Lungs clear heart regular abdomen soft nontender neurologic exam cranial nerves 2-12 intact motor 5/5 sensory grossly intact cerebellar testing normal  Impression:  Headache  Critical Care Time  CritCare Time    Procedures

## 2019-01-10 NOTE — DISCHARGE INSTRUCTIONS
Dolor de mae joy   LO QUE NECESITA SABER:   ¿Qué es un dolor de mae joy? El dolor de [place] joy es un dolor o molestia que comienza de repente y FAROOQ rápidamente  Usted puede tener un dolor de mae joy sólo cuando siente estrés o come ciertos alimentos  Otro tipo dolor de mae joy puede producirse todos los días y a veces varias veces al día  ¿Cuáles son los tipos más comunes de dolor de [place] joy? · El dolor de mae tensional  es el tipo de dolor de mae más común  Normalmente aparece por la tarde y se va al atardecer  El dolor generalmente es leve o moderado  La kristine brillante o el ruido bessy podrían estorbarle  Generalmente el dolor se encuentra a través de la frente o en la parte posterior de la mae y con frecuencia sólo en un lado  Meaghan dolor de mae podría ocurrir todos los días  · Las migrañas  provocan dolor de moderado a intenso  El dolor de mae dura de 1 a 3 días generalmente y [employer] es recurrente  El dolor tiende a estar sólo en un lado kelsey puede cambiar de [place]  La migraña se localiza en la sien o en la parte posterior de la mae o del julia  El dolor podría pulsar o estar joy y continuo  · Maryanne migraña con aura  significa que usted ve o siente algo antes de [place]  Podría misty maryanne pequeña pita rodeada de líneas brillantes en zigzag  Otros signos o síntomas podrían seguir al aura  · El dolor de mae en racimo  se siente solamente en un lado  A menudo causa dolor severo y puede durar de 30 minutos a 2 horas  Meaghan dolor de mae podría ocurrir 1 vez o 2 veces al día, a menudo a la noche  El dolor puede despertarlo  ¿Qué causa el dolor de mae joy? La causa de perez dolor de mae podría ser desconocida  Las siguientes condiciones pueden provocar o desencadenar un dolor de mae:  · Estrés o tensión, horas o incluso días después de experimentar un hecho estresante      · Fatiga, falta de sueño o cambios en perez patrón de sueño habitual, o mirna [employer] día    · Menstruación, especialmente después del embarazo o del uso de píldoras anticonceptivas o terapia de reemplazo hormonal    · Alimentos byron embutidos, edulcorantes artificiales, alcohol, chocolate oscuro y el GMS     · Repentinamente no contar con cafeína si por lo general consume cantidades elevadas    · Un problema médico, byron Pitney Allen, dolor de [employer], dolor de anne o de senos paranasales, problemas de tiroides o un tumor    · [place] en la mae  ¿Cómo se diagnostica y trata el tipo de dolor de mae joy? Perez médico le solicitará que clasifique perez dolor en maryanne escala numérica de 1 a 10  Infórmele con qué frecuencia tiene soy de [place] y cuánto tiempo Claudeen Southerly  También se describen otros síntomas concomitantes con los soy de [place], [employer] o la visión borrosa  · Medicamentos,  se puedes administrar para controlar o prevenir el dolor de mae  El medicamento dependerá del tipo de dolor de mae joy que tenga  No espere a que el dolor sea muy intenso para mirna el medicamento  Puede mirna analgésicos sin prescripción médica, según sea necesario  Los ejemplos de [employer] [place] y el acetaminofén  Consulte con perez médico cuál medicamento es el adecuado para usted  Pregunte cuánto mirna y cuándo  [address]  Estos medicamentos pueden causar sangrado abdominal o problemas renales si no se los marcio correctamente  · La bioretroalimentación  podría usarse para ayudarlo a controlar perez estrés  Los electrodos (alambres) son colocados en perez cuerpo y conectados a un monitor  Usted aprenderá a cambiar la forma que perez cuerpo reacciona al estrés  Por ejemplo, usted aprende a disminuir perez latido cardíaco cuando se molesta  · La terapia cognitivo conductual,  o el manejo del estrés pueden ser utilizadas junto con otras terapias para prevenir soy de [place]  ¿Qué puedo hacer para manejar mis síntomas?    · Aplique hielo o calor  en la karlene donde perez hijo siente el dolor de mae  Utilice un paquete (compresa) de hielo o calor  Para un paquete de hielo, también puede colocar hielo molido en maryanne bolsa plástica  Cubra el paquete de hielo o la bolsa con maryanne toalla pequeña antes de aplicarla en la piel  Tanto el hielo byron el calor ayudan a reducir el dolor, y el calor también contribuye a reducir los [employer]  Aplique calor karon 20 a 30 minutos cada 2 horas  Aplique hielo karon 15 a 20 minutos cada hora  Aplique calor o hielo karon el tiempo y la cantidad de días que se le indique  Usted puede alternar el calor y el hielo  · Relaje kristyn músculos  Acuéstese en maryanne posición cómoda y cierre kristyn ojos  Relaje kristyn músculos lentamente  Comience por los dedos de los pies y avance hacia arriba al monalisa de perez cuerpo  · Registre en un diario kristyn soy de [place]  Escriba cuándo comienzan y terminan kristyn migrañas  Jatinder Paul y qué estaba haciendo cuando comenzó la migraña  Registre lo que comió y lo que tomó las 24 horas previas al comienzo de perez migraña  Ilene Snellen dolor y dónde le duele: Lleve un registro de lo que hizo para tratar perez Julia Flank y si obtuvo un resultado satisfactorio  ¿Qué puedo hacer para evitar un dolor de mae joy? · Evite cualquier cosa que provoque un dolor de mae joy  Los ejemplos incluyen la exposición a sustancias químicas, las grandes altitudes o no dormir lo suficiente  Kitty maryanne rutina para dormir  Acuéstese y [employer] días a la misma hora  No utilice aparatos electrónicos antes de acostarse  Pueden provocarle un dolor de mae o impedirle dormir tino  · No fume  La nicotina y otras sustancias químicas en los cigarrillos y puros pueden desencadenar un dolor de mae joy o [place]  Pida información a perez médico si usted actualmente fuma y necesita ayuda para dejar de fumar  Los cigarrillos electrónicos o tabaco sin humo todavía contienen nicotina   Consulte con perez médico antes de [employer]  · Limite el consumo de alcohol según le indicaron  El alcohol puede provocar un dolor de mae joy o empeorarlo  Si usted tiene soy de [place] de racimo, no hetal alcohol karon un episodio  Para otros tipos de soy de [place], pregúntele a perez proveedor de atención médica si es seguro para usted beber alcohol  Pregunte cuál es la cantidad pritchard que puede beber y con qué frecuencia  · Ejercítese según indicaciones  El ejercicio puede reducir la tensión y [place] a aliviar el dolor de [place]  Propóngase hacer 30 minutos de [place] física soy todos los días de la [place]  Perez médico puede ayudarle a crear un plan de ejercicios  · Consuma alimentos saludables y variados  [address] frutas, verduras, productos lácteos bajos en grasa, eladia [place], pescado y frijoles cocidos  Perez médico o dietista puede ayudarle a crear planes de comidas si desea evitar los alimentos que provocan soy de [place]  ¿Cuándo adarsh buscar atención inmediata? · Usted tiene dolor intenso  · Usted tiene entumecimiento en un lado de perez jackson o cuerpo  · Usted tiene un dolor de mae que ocurre después de un golpe en la mae, maryanne caída u otro trauma  · Tiene dolor de [place], está olvidadizo o confundido o tiene dificultad para hablar  · Tiene dolor de [place], rigidez en el anne y [place]  ¿Cuándo adarsh comunicarme con mi médico?   · Usted tiene un dolor de mae mery y está vomitando  · Usted tiene dolor de mae todos los días y no se [place] aun después de tratarlo  · Sadia soy de [facility] u ocurren nuevos síntomas cuando tiene dolor de [place]  · Usted tiene preguntas o inquietudes acerca de perez condición o cuidado  ACUERDOS SOBRE PEREZ CUIDADO:   Usted tiene el derecho de ayudar a planear perez cuidado  Aprenda todo lo que pueda sobre perez condición y byron darle tratamiento   [address] [place] [address] con sadia médicos para decidir el cuidado que usted desea recibir  Usted siempre tiene el derecho de rechazar el tratamiento  Esta información es sólo para uso en educación  Mcknight intención no es darle un consejo médico sobre enfermedades o tratamientos  Colsulte con mcknight Link Drones farmacéutico antes de seguir cualquier régimen médico para saber si es seguro y efectivo para usted  © 2017 [address] Hong Information is for End User's use only and may not be sold, redistributed or otherwise used for commercial purposes  All illustrations and images included in CareNotes® are the copyrighted property of A D A M , Inc  or Chao Orellana   LO QUE NECESITA SABER:   El vértigo es maryanne afección que hace que usted se sienta mareado  Es posible que tenga la sensación de que usted o todo a mcknight alrededor se está moviendo o dando vueltas  Usted también podría sentir byron que lo empujan hacia el piso o hacia un lado  INSTRUCCIONES SOBRE EL [facility]WINDER HOSPITALARIA:   Busque atención médica de inmediato si:   · Usted tiene dolor de [place] y rigidez en el anne  · Usted tiene escalofríos con temblores y [place]  · Usted vomita maryanne y [place] vez sin [place]  · [employer], pus o líquido de los oídos  · Usted está confundido  Pregúntele a mcknight Felix Parkers vitaminas y minerales son adecuados para usted  · Sadia síntomas no mejoran con el tratamiento  · Tiene alguna pregunta acerca de mcknight condición o cuidado  Medicamentos:   · Medicamento  podría administrase para aliviar sadia síntomas  · Ider sadia medicamentos byron se le haya indicado  Consulte con mcknight médico si usted ken que mcknight medicamento no le está ayudando o si presenta efectos secundarios  Infórmele si es alérgico a cualquier medicamento  Mantenga maryanne lista actualizada de los [place], las vitaminas y los productos herbales que marcio   Incluya los siguientes datos de los medicamentos: cantidad, frecuencia y [place] de administración  Traiga con usted la lista o los envases de la píldoras a kristyn citas de seguimiento  Lleve la lista de los medicamentos con usted en amanda de maryanne emergencia  El manejo de mcknight síntomas:   · No maneje  No camine sin ayuda ni maneje maquinaria pesada cuando está mareado  · Muévase lentamente  cuando pase de maryanne posición a otra  Levántese lentamente después de silvia estado sentado o acostado  Siéntese o acuéstese en seguida si se siente mareado  · [place] suficiente líquidos  Los líquidos ayudan a evitar la deshidratación  Pregunte cuánto líquido debe mirna cada día y cuáles líquidos son los más adecuados para usted  · Terapia de rehabilitación vestibular y del equilibrio (TRVE)  se [place] para enseñarle ejercicios para mejorar mcknight equilibrio y fuerza  Estos ejercicios pueden ayudarle a disminuir mcknight vértigo y a mejorar mcknight equilibrio  Pida más información sobre esta terapia  Acuda a kristyn consultas de control con mcknight médico según le indicaron  Anote kristyn preguntas para que se acuerde de hacerlas karon kristyn visitas  © 2017 2600 [facility] Information is for End User's use only and may not be sold, redistributed or otherwise used for commercial purposes  All illustrations and images included in CareNotes® are the copyrighted property of A D A M , Inc  or Chao Kay  Esta información es sólo para uso en educación  Mcknight intención no es darle un consejo médico sobre enfermedades o tratamientos  Colsulte con mcknight [place] farmacéutico antes de seguir cualquier régimen médico para saber si es seguro y efectivo para usted

## 2019-03-12 ENCOUNTER — HOSPITAL ENCOUNTER (EMERGENCY)
Facility: HOSPITAL | Age: 49
Discharge: HOME | End: 2019-03-12
Attending: EMERGENCY MEDICINE | Admitting: EMERGENCY MEDICINE
Payer: COMMERCIAL

## 2019-03-12 VITALS
SYSTOLIC BLOOD PRESSURE: 102 MMHG | OXYGEN SATURATION: 97 % | DIASTOLIC BLOOD PRESSURE: 60 MMHG | RESPIRATION RATE: 18 BRPM | TEMPERATURE: 98.8 F | WEIGHT: 189.6 LBS | BODY MASS INDEX: 37.03 KG/M2 | HEART RATE: 70 BPM

## 2019-03-12 DIAGNOSIS — R51.9 HEADACHE: Primary | ICD-10-CM

## 2019-03-12 DIAGNOSIS — R42 DIZZINESS: ICD-10-CM

## 2019-03-12 LAB
ANION GAP SERPL CALCULATED.3IONS-SCNC: 10 MMOL/L (ref 4–13)
BACTERIA UR QL AUTO: ABNORMAL /HPF
BASOPHILS # BLD AUTO: 0.06 THOUSANDS/ΜL (ref 0–0.1)
BASOPHILS NFR BLD AUTO: 1 % (ref 0–1)
BILIRUB UR QL STRIP: NEGATIVE
BUN SERPL-MCNC: 12 MG/DL (ref 5–25)
CALCIUM SERPL-MCNC: 9 MG/DL (ref 8.3–10.1)
CHLORIDE SERPL-SCNC: 105 MMOL/L (ref 100–108)
CLARITY UR: ABNORMAL
CO2 SERPL-SCNC: 24 MMOL/L (ref 21–32)
COLOR UR: ABNORMAL
CREAT SERPL-MCNC: 0.53 MG/DL (ref 0.6–1.3)
EOSINOPHIL # BLD AUTO: 0.29 THOUSAND/ΜL (ref 0–0.61)
EOSINOPHIL NFR BLD AUTO: 3 % (ref 0–6)
ERYTHROCYTE [DISTWIDTH] IN BLOOD BY AUTOMATED COUNT: 17.3 % (ref 11.6–15.1)
GFR SERPL CREATININE-BSD FRML MDRD: 113 ML/MIN/1.73SQ M
GLUCOSE SERPL-MCNC: 98 MG/DL (ref 65–140)
GLUCOSE UR STRIP-MCNC: NEGATIVE MG/DL
HCT VFR BLD AUTO: 37.8 % (ref 34.8–46.1)
HGB BLD-MCNC: 11.8 G/DL (ref 11.5–15.4)
HGB UR QL STRIP.AUTO: ABNORMAL
IMM GRANULOCYTES # BLD AUTO: 0.04 THOUSAND/UL (ref 0–0.2)
IMM GRANULOCYTES NFR BLD AUTO: 0 % (ref 0–2)
KETONES UR STRIP-MCNC: NEGATIVE MG/DL
LEUKOCYTE ESTERASE UR QL STRIP: ABNORMAL
LYMPHOCYTES # BLD AUTO: 2.51 THOUSANDS/ΜL (ref 0.6–4.47)
LYMPHOCYTES NFR BLD AUTO: 22 % (ref 14–44)
MCH RBC QN AUTO: 24.8 PG (ref 26.8–34.3)
MCHC RBC AUTO-ENTMCNC: 31.2 G/DL (ref 31.4–37.4)
MCV RBC AUTO: 79 FL (ref 82–98)
MONOCYTES # BLD AUTO: 0.67 THOUSAND/ΜL (ref 0.17–1.22)
MONOCYTES NFR BLD AUTO: 6 % (ref 4–12)
NEUTROPHILS # BLD AUTO: 8 THOUSANDS/ΜL (ref 1.85–7.62)
NEUTS SEG NFR BLD AUTO: 68 % (ref 43–75)
NITRITE UR QL STRIP: NEGATIVE
NON-SQ EPI CELLS URNS QL MICRO: ABNORMAL /HPF
NRBC BLD AUTO-RTO: 0 /100 WBCS
PH UR STRIP.AUTO: 7 [PH]
PLATELET # BLD AUTO: 351 THOUSANDS/UL (ref 149–390)
PMV BLD AUTO: 10 FL (ref 8.9–12.7)
POTASSIUM SERPL-SCNC: 4 MMOL/L (ref 3.5–5.3)
PROT UR STRIP-MCNC: ABNORMAL MG/DL
RBC # BLD AUTO: 4.76 MILLION/UL (ref 3.81–5.12)
RBC #/AREA URNS AUTO: ABNORMAL /HPF
SODIUM SERPL-SCNC: 139 MMOL/L (ref 136–145)
SP GR UR STRIP.AUTO: <=1.005 (ref 1–1.03)
TROPONIN I SERPL-MCNC: <0.02 NG/ML
UROBILINOGEN UR QL STRIP.AUTO: 0.2 E.U./DL
WBC # BLD AUTO: 11.57 THOUSAND/UL (ref 4.31–10.16)
WBC #/AREA URNS AUTO: ABNORMAL /HPF

## 2019-03-12 PROCEDURE — 96372 THER/PROPH/DIAG INJ SC/IM: CPT

## 2019-03-12 PROCEDURE — 96361 HYDRATE IV INFUSION ADD-ON: CPT

## 2019-03-12 PROCEDURE — 80048 BASIC METABOLIC PNL TOTAL CA: CPT | Performed by: PHYSICIAN ASSISTANT

## 2019-03-12 PROCEDURE — 84484 ASSAY OF TROPONIN QUANT: CPT | Performed by: PHYSICIAN ASSISTANT

## 2019-03-12 PROCEDURE — 96374 THER/PROPH/DIAG INJ IV PUSH: CPT

## 2019-03-12 PROCEDURE — 81001 URINALYSIS AUTO W/SCOPE: CPT | Performed by: PHYSICIAN ASSISTANT

## 2019-03-12 PROCEDURE — 96375 TX/PRO/DX INJ NEW DRUG ADDON: CPT

## 2019-03-12 PROCEDURE — 99284 EMERGENCY DEPT VISIT MOD MDM: CPT

## 2019-03-12 PROCEDURE — 36415 COLL VENOUS BLD VENIPUNCTURE: CPT | Performed by: PHYSICIAN ASSISTANT

## 2019-03-12 PROCEDURE — 85025 COMPLETE CBC W/AUTO DIFF WBC: CPT | Performed by: PHYSICIAN ASSISTANT

## 2019-03-12 PROCEDURE — 93005 ELECTROCARDIOGRAM TRACING: CPT

## 2019-03-12 RX ORDER — IBUPROFEN 600 MG/1
600 TABLET ORAL EVERY 6 HOURS PRN
Qty: 14 TABLET | Refills: 0 | Status: SHIPPED | OUTPATIENT
Start: 2019-03-12 | End: 2019-03-12 | Stop reason: SDUPTHER

## 2019-03-12 RX ORDER — KETOROLAC TROMETHAMINE 30 MG/ML
30 INJECTION, SOLUTION INTRAMUSCULAR; INTRAVENOUS ONCE
Status: COMPLETED | OUTPATIENT
Start: 2019-03-12 | End: 2019-03-12

## 2019-03-12 RX ORDER — IBUPROFEN 600 MG/1
600 TABLET ORAL EVERY 6 HOURS PRN
Qty: 14 TABLET | Refills: 0 | Status: SHIPPED | OUTPATIENT
Start: 2019-03-12 | End: 2020-01-15

## 2019-03-12 RX ORDER — MECLIZINE HYDROCHLORIDE 25 MG/1
25 TABLET ORAL 3 TIMES DAILY PRN
Qty: 20 TABLET | Refills: 0 | Status: SHIPPED | OUTPATIENT
Start: 2019-03-12 | End: 2020-01-16

## 2019-03-12 RX ORDER — ACETAMINOPHEN 325 MG/1
975 TABLET ORAL ONCE
Status: COMPLETED | OUTPATIENT
Start: 2019-03-12 | End: 2019-03-12

## 2019-03-12 RX ORDER — METOCLOPRAMIDE HYDROCHLORIDE 5 MG/ML
10 INJECTION INTRAMUSCULAR; INTRAVENOUS ONCE
Status: COMPLETED | OUTPATIENT
Start: 2019-03-12 | End: 2019-03-12

## 2019-03-12 RX ORDER — DIPHENHYDRAMINE HYDROCHLORIDE 50 MG/ML
25 INJECTION INTRAMUSCULAR; INTRAVENOUS ONCE
Status: COMPLETED | OUTPATIENT
Start: 2019-03-12 | End: 2019-03-12

## 2019-03-12 RX ADMIN — METOCLOPRAMIDE 10 MG: 5 INJECTION, SOLUTION INTRAMUSCULAR; INTRAVENOUS at 21:34

## 2019-03-12 RX ADMIN — SODIUM CHLORIDE 1000 ML: 0.9 INJECTION, SOLUTION INTRAVENOUS at 21:31

## 2019-03-12 RX ADMIN — DIPHENHYDRAMINE HYDROCHLORIDE 25 MG: 50 INJECTION, SOLUTION INTRAMUSCULAR; INTRAVENOUS at 21:29

## 2019-03-12 RX ADMIN — ACETAMINOPHEN 975 MG: 325 TABLET, FILM COATED ORAL at 21:19

## 2019-03-12 RX ADMIN — KETOROLAC TROMETHAMINE 30 MG: 30 INJECTION, SOLUTION INTRAMUSCULAR at 21:36

## 2019-03-13 LAB
ATRIAL RATE: 66 BPM
P AXIS: 39 DEGREES
PR INTERVAL: 154 MS
QRS AXIS: -11 DEGREES
QRSD INTERVAL: 94 MS
QT INTERVAL: 386 MS
QTC INTERVAL: 404 MS
T WAVE AXIS: 34 DEGREES
VENTRICULAR RATE: 66 BPM

## 2019-03-13 PROCEDURE — 93010 ELECTROCARDIOGRAM REPORT: CPT | Performed by: INTERNAL MEDICINE

## 2019-03-13 NOTE — DISCHARGE INSTRUCTIONS
Dolor de mae joy   LO QUE NECESITA SABER:   El dolor de [place] joy es un dolor o molestia que comienza de repeирина y FAROOQ rápidamente  Usted puede tener un dolor de mae joy sólo cuando siente estrés o come ciertos alimentos  Otro tipo dolor de mae joy puede producirse todos los días y a veces varias veces al día  INSTRUCCIONES SOBRE EL [facility]WINDER HOSPITALARIA:   Regrese a la ciro de emergencias si:   · Usted tiene dolor intenso  · Usted tiene entumecimiento en un lado de perez jackson o cuerpo  · Usted tiene un dolor de mae que ocurre después de un golpe en la mae, maryanne caída u otro trauma  · Tiene dolor de [place], está olvidadizo o confundido o tiene dificultad para hablar  · Tiene dolor de [place], rigidez en el anne y [place]  Pregúntele a perez [place] vitaminas y minerales son adecuados para usted  · Usted tiene un dolor de mae mery y está vomitando  · Usted tiene dolor de mae todos los días y no se [place] aun después de tratarlo  · Sadia soy de [place] u ocurren nuevos síntomas cuando tiene dolor de [place]  · Usted tiene preguntas o inquietudes acerca de perez condición o cuidado  Medicamentos:  Es posible que usted necesite alguno de los siguientes:  · Un medicamento con receta para el dolor  podrían ser Elihue Jaycee  El medicamento que recomienda perez médico dependerá del tipo de dolor de mae que tenga  Usted necesitará mirna medicamentos para el dolor de mae según las indicaciones para evitar un problema llamado dolor de mae de rebote  Estos soy de [place] ocurren con el uso regular de analgésicos para los trastornos de dolor de [place]  · AINEs (Analgésicos antiinflamatorios no esteroides) byron el ibuprofeno, ayudan a disminuir la inflamación, el dolor y la [place]  Meaghan medicamento esta disponible con o sin maryanne receta médica  Los AINEs pueden causar sangrado estomacal o problemas renales en ciertas personas   Si usted marcio un medicamento anticoagulante, siempre pregúntele a perez médico si los FERNANDO son seguros para usted  Siempre cristal la etiqueta de rakesh medicamento y [place] instrucciones  · El acetaminofén  [place] el dolor y baja la fiebre  Está disponible sin receta médica  Pregunte la cantidad y la frecuencia con que debe tomarlos  [address]  Cristal las etiquetas de todos los demás medicamentos que esté usando para saber si también contienen acetaminofén, o pregunte a perez médico o farmacéutico  El acetaminofén puede causar daño en el hígado cuando no se marcio de forma correcta  No use más de 3 gramos (3,000 miligramos) en total de acetaminofeno en un día  · Antidepresivos  se pueden administrar para algunos tipos de soy de [place]  · [place] kristyn medicamentos byron se le haya indicado  Consulte con perez médico si usted ken que perez medicamento no le está ayudando o si presenta efectos secundarios  Infórmele si es alérgico a cualquier medicamento  Mantenga maryanne lista actualizada de los [place], las vitaminas y los productos herbales que marcio  Incluya los siguientes datos de los medicamentos: cantidad, frecuencia y motivo de administración  Traiga con usted la lista o los envases de la píldoras a kristyn citas de seguimiento  Lleve la lista de los medicamentos con usted en amanda de maryanne emergencia  El manejo de perez síntomas:   · Aplique hielo o calor  en la karlene donde perez hijo siente el dolor de mae  Utilice un paquete (compresa) de hielo o calor  Para un paquete de hielo, también puede colocar hielo molido en maryanne bolsa plástica  Cubra el paquete de hielo o la bolsa con maryanne toalla pequeña antes de aplicarla en la piel  Tanto el hielo byron el calor ayudan a reducir el dolor, y el calor también contribuye a reducir los [employer]  Aplique calor karon 20 a 30 minutos cada 2 horas  Aplique hielo karon 15 a 20 minutos cada hora  Aplique calor o hielo karon el tiempo y la cantidad de días que se le indique   Marilynn Dinning puede alternar el calor y el hielo  · Relaje kristyn músculos  Acuéstese en maryanne posición cómoda y cierre kristyn ojos  Relaje kristyn músculos lentamente  Comience por los dedos de los pies y avance hacia arriba al monalisa de perez cuerpo  · Registre en un diario kristyn soy de [place]  Escriba cuándo comienzan y terminan kristyn migrañas  Elnoria Mate y qué estaba haciendo cuando comenzó la migraña  Registre lo que comió y lo que tomó las 24 horas previas al comienzo de perez migraña  Janet Juan dolor y dónde le duele: Lleve un registro de lo que hizo para tratar perez Kristofer [place] y si obtuvo un resultado satisfactorio  Cómo prevenir un dolor de mae joy:   · Evite cualquier cosa que provoque un dolor de mae joy  Los ejemplos incluyen la exposición a sustancias químicas, las grandes altitudes o no dormir lo suficiente  Kitty maryanne rutina para dormir  Acuéstese y [employer] días a la misma hora  No utilice aparatos electrónicos antes de acostarse  Pueden provocarle un dolor de mae o impedirle dormir tino  · No fume  La nicotina y otras sustancias químicas en los cigarrillos y puros pueden desencadenar un dolor de mae joy o [place]  Pida información a perez médico si usted actualmente fuma y necesita ayuda para dejar de fumar  Los cigarrillos electrónicos o tabaco sin humo todavía contienen nicotina  Consulte con perez médico antes de [employer]  · Limite el consumo de alcohol según le indicaron  El alcohol puede provocar un dolor de mae joy o empeorarlo  Si usted tiene soy de [place] de racimo, no hetal alcohol karon un episodio  Para otros tipos de soy de [place], pregúntele a perez proveedor de atención médica si es seguro para usted beber alcohol  Pregunte cuál es la cantidad pritchard que puede beber y con qué frecuencia  · Ejercítese según indicaciones  El ejercicio puede reducir la tensión y [place] a aliviar el dolor de [place]   Propóngase hacer 30 minutos de [place] física soy todos los días de la [place]  Mcknight médico puede ayudarle a crear un plan de ejercicios  · Consuma alimentos saludables y variados  [address] frutas, verduras, productos lácteos bajos en grasa, eladia [place], pescado y frijoles cocidos  Mcknight médico o dietista puede ayudarle a crear planes de comidas si desea evitar los alimentos que provocan soy de [place]  Acuda a kristyn consultas de control con mcknight médico según le indicaron  Traiga mcknight registro de soy de mae con usted cuando visite a mcknight médico  Anote kristyn preguntas para que se acuerde de hacerlas karon kristyn visitas  © 2017 [address] Gerardo Hong Information is for End User's use only and may not be sold, redistributed or otherwise used for commercial purposes  All illustrations and images included in CareNotes® are the copyrighted property of A D A M , Inc  or Chao Kay  Esta información es sólo para uso en educación  Mcknight intención no es darle un consejo médico sobre enfermedades o tratamientos  Colsulte con mcknight [place] farmacéutico antes de seguir cualquier régimen médico para saber si es seguro y efectivo para usted  Mareos   LO QUE NECESITA SABER:   El mareo es la sensación de falta de equilibrio o inestabilidad  Las causas comunes del mareo son un desequilibrio del líquido del oído interno o la falta de oxígeno en mcknight yanely  Los Po[place] ser [employer] (durar 3 días o menos) o crónicos (durar más de 3 días)  Usted puede llegar a tener episodios de [employer] que bronson de segundos a unas horas  INSTRUCCIONES SOBRE EL GURWINDER HOSPITALARIA:   Regrese a la ciro de emergencias si:   · Usted tiene dolor de [place] y rigidez en el anne  · Usted tiene escalofríos con temblores y [place]  · Usted vomita maryanne y [place] vez sin [place]  · Mcknight vómito o deposiciones están vicente o negras  · Usted tiene dolor en el pecho, espalda o abdomen       · Usted tiene adormecimiento, sobre todo [employer] jackson, brazos o piernas  · Usted tiene dificultad para  los brazos o las piernas  · Usted está confundido  Pregúntele a mcknight Eamon Roses vitaminas y minerales son adecuados para usted  · Usted tiene fiebre  · Sadia síntomas no mejoran con el tratamiento  · Usted tiene preguntas o inquietudes acerca de mcknight condición o cuidado  El [place] de mcknight síntomas:   · No maneje   u opere maquinaria pesada cuando esté mareado  · Levántese lentamente  cuando esté sentado o acostado  · [place] suficiente líquidos  Los líquidos ayudan a evitar la deshidratación  Pregunte cuánto líquido debe mirna cada día y cuáles líquidos son los más adecuados para usted  Acuda a sadia consultas de control con mcknight médico según le indicaron  Anote sadia preguntas para que se acuerde de hacerlas karon sadia visitas  © 2017 [address] Gerrado Hong Information is for End User's use only and may not be sold, redistributed or otherwise used for commercial purposes  All illustrations and images included in CareNotes® are the copyrighted property of A D A M , Inc  or Chao Kya  Esta información es sólo para uso en educación  Mcknight intención no es darle un consejo médico sobre enfermedades o tratamientos  Colsulte con mcknight Sharon Ang[place] farmacéutico antes de seguir cualquier régimen médico para saber si es seguro y efectivo para usted

## 2019-09-02 NOTE — ED PROVIDER NOTES
History  Chief Complaint   Patient presents with    Headache     Pt c/o head "pressure" x3 days  (+) Nausea  (+) Dizziness  Pt states "I'm anemic and I'm on my monthy cycle right now "     Marcy Ny is a 50 y o  Female with a PMHx of Anemia who presents to the ED with complaints of pressure to the top and back of the head x 3 days  Patient states nausea began today  Patient states she has intermittent blurred vision today  Patient admits to photophobia and phonophobia  Patient states she has been having intermittent dizziness (room spinning) x 3 days  Patient states her menstrual period began yesterday and has been having BRB from the vagina, 4 pads per day  Patient states over the past year she has been having increasing headaches and dizziness during her menstrual cycle  Patient was seen and evaluated for similar episode in January during which time CTA head and neck were performed which were within normal limits  Patient states pain improved with medications which were given in the ER  Patient states she did follow-up with her primary care provider who told her she does have iron deficiency anemia and was placed on iron for supplementation  Denies neck pain, neck stiffness, worse headache of her life, syncope, numbness, tingling, head injury, vomiting, changes in hearing, tinnitus, vaginal discharge, abdominal pain, vomiting, urinary frequency, urinary urgency, hematuria, dysuria, chest pain, shortness of breath, fever, chills  Patient states she took Motrin yesterday without relief        History provided by:  Patient  Headache   Pain location:  Generalized  Quality:  Dull  Severity currently:  9/10  Severity at highest:  9/10  Onset quality:  Gradual  Duration:  3 days  Timing:  Constant  Associated symptoms: blurred vision, dizziness, fatigue, nausea and photophobia    Associated symptoms: no abdominal pain, no back pain, no congestion, no cough, no diarrhea, no drainage, no ear pain, no eye pain, no facial pain, no fever, no focal weakness, no hearing loss, no loss of balance, no myalgias, no near-syncope, no neck pain, no neck stiffness, no numbness, no paresthesias, no seizures, no sinus pressure, no sore throat, no swollen glands, no syncope, no tingling, no URI, no vomiting and no weakness    Dizziness:     Severity:  Moderate    Duration:  3 days    Timing:  Intermittent      Prior to Admission Medications   Prescriptions Last Dose Informant Patient Reported? Taking? IRON PO 3/12/2019 at Unknown time  Yes Yes   Sig: Take 45 mg by mouth   ibuprofen (MOTRIN) 200 mg tablet   Yes No   Sig: Take by mouth every 6 (six) hours as needed for mild pain   meclizine (ANTIVERT) 12 5 MG tablet   No No   Sig: Take 1 tablet (12 5 mg total) by mouth 3 (three) times a day as needed for dizziness   promethazine-codeine (PHENERGAN WITH CODEINE) 6 25-10 mg/5 mL syrup   No No   Si-10 mL by mouth every 6 hours as needed for cough   Patient not taking: Reported on 1/10/2019       Facility-Administered Medications: None       Past Medical History:   Diagnosis Date    Anemia     Pneumonia        Past Surgical History:   Procedure Laterality Date    CHOLECYSTECTOMY         Family History   Problem Relation Age of Onset    Hyperlipidemia Mother     No Known Problems Father      I have reviewed and agree with the history as documented  Social History     Tobacco Use    Smoking status: Never Smoker    Smokeless tobacco: Never Used   Substance Use Topics    Alcohol use: No    Drug use: No        Review of Systems   Constitutional: Positive for fatigue  Negative for appetite change, chills, fever and unexpected weight change  HENT: Negative for congestion, drooling, ear pain, hearing loss, postnasal drip, rhinorrhea, sinus pressure, sore throat, trouble swallowing and voice change  Eyes: Positive for blurred vision and photophobia  Negative for pain, discharge, redness and visual disturbance  Respiratory: Negative for cough, shortness of breath, wheezing and stridor  Cardiovascular: Negative for chest pain, palpitations, leg swelling, syncope and near-syncope  Gastrointestinal: Positive for nausea  Negative for abdominal pain, blood in stool, constipation, diarrhea and vomiting  Genitourinary: Negative for dysuria, flank pain, frequency, hematuria and urgency  Musculoskeletal: Negative for back pain, gait problem, joint swelling, myalgias, neck pain and neck stiffness  Skin: Negative for color change and rash  Neurological: Positive for dizziness and headaches  Negative for tremors, focal weakness, seizures, syncope, facial asymmetry, speech difficulty, weakness, light-headedness, numbness, paresthesias and loss of balance  Physical Exam  Physical Exam   Constitutional: She is oriented to person, place, and time  Vital signs are normal  She appears well-developed and well-nourished  She is cooperative  Non-toxic appearance  No distress  HENT:   Head: Normocephalic and atraumatic  Right Ear: Hearing, tympanic membrane, external ear and ear canal normal    Left Ear: Hearing, tympanic membrane, external ear and ear canal normal    Nose: Nose normal    Mouth/Throat: Uvula is midline, oropharynx is clear and moist and mucous membranes are normal    Eyes: Pupils are equal, round, and reactive to light  Conjunctivae, EOM and lids are normal  Right eye exhibits no nystagmus  Left eye exhibits no nystagmus  Neck: Trachea normal, normal range of motion, full passive range of motion without pain and phonation normal  Neck supple  No neck rigidity  Cardiovascular: Normal rate, regular rhythm, intact distal pulses and normal pulses  Pulmonary/Chest: Effort normal and breath sounds normal    Abdominal: Soft  Bowel sounds are normal  There is no tenderness  Musculoskeletal: Normal range of motion  Neurological: She is alert and oriented to person, place, and time   She has normal strength and normal reflexes  No cranial nerve deficit or sensory deficit  She displays a negative Romberg sign  GCS eye subscore is 4  GCS verbal subscore is 5  GCS motor subscore is 6  Visual fields were intact to confrontation  Visual pursuits were smooth with normal saccadic eye movements  Facial sensation intact b/l with no evidence of facial asymmetry  Hearing was normal b/l and the tongue and palate were in midline  SCM and upper trapezius strength normal b/l  No evidence of postural or action tremor, No dysmetria seen on FTN testing   No evidence of sensory deficits   Patient ambulated without difficulty, normal steady gait   Skin: Skin is warm and dry  Capillary refill takes less than 2 seconds  Psychiatric: She has a normal mood and affect  Nursing note and vitals reviewed        Vital Signs  ED Triage Vitals   Temperature Pulse Respirations Blood Pressure SpO2   03/12/19 2000 03/12/19 2000 03/12/19 2000 03/12/19 2000 03/12/19 2000   98 8 °F (37 1 °C) 70 18 152/76 98 %      Temp Source Heart Rate Source Patient Position - Orthostatic VS BP Location FiO2 (%)   03/12/19 2000 03/12/19 2000 03/12/19 2000 03/12/19 2000 --   Oral Monitor Sitting Left arm       Pain Score       03/12/19 2002       9           Vitals:    03/12/19 2000   BP: 152/76   Pulse: 70   Patient Position - Orthostatic VS: Sitting       qSOFA     Row Name 03/12/19 2135 03/12/19 2000             Altered mental status GCS < 15  0         Respiratory Rate > / =22    0       Systolic BP < / =885    0       Q Sofa Score  0  0             Visual Acuity  Visual Acuity      Most Recent Value   L Pupil Size (mm)  5   R Pupil Size (mm)  5          ED Medications  Medications   sodium chloride 0 9 % bolus 1,000 mL (1,000 mL Intravenous New Bag 3/12/19 2131)   metoclopramide (REGLAN) injection 10 mg (10 mg Intravenous Given 3/12/19 2134)   ketorolac (TORADOL) injection 30 mg (30 mg Intramuscular Given 3/12/19 2136)   diphenhydrAMINE (BENADRYL) injection 25 mg (25 mg Intravenous Given 3/12/19 2129)   acetaminophen (TYLENOL) tablet 975 mg (975 mg Oral Given 3/12/19 2119)       Diagnostic Studies  Results Reviewed     Procedure Component Value Units Date/Time    Urine Microscopic [[id number]]  (Abnormal) Collected:  03/12/19 2247    Lab Status:  Final result Specimen:  Urine, Clean Catch Updated:  03/12/19 2300     RBC, UA Innumerable /hpf      WBC, UA None Seen /hpf      Epithelial Cells Occasional /hpf      Bacteria, UA Occasional /hpf     UA w Reflex to Microscopic [[id number]]  (Abnormal) Collected:  03/12/19 2247    Lab Status:  Final result Specimen:  Urine, Clean Catch Updated:  03/12/19 2256     Color, UA Red     Clarity, UA Cloudy     Specific Gravity, UA <=1 005     pH, UA 7 0     Leukocytes, UA Trace     Nitrite, UA Negative     Protein, UA Trace mg/dl      Glucose, UA Negative mg/dl      Ketones, UA Negative mg/dl      Urobilinogen, UA 0 2 E U /dl      Bilirubin, UA Negative     Blood, UA Large    Troponin I [[id number]]  (Normal) Collected:  03/12/19 2123    Lab Status:  Final result Specimen:  Blood from Arm, Right Updated:  03/12/19 2157     Troponin I <0 02 ng/mL     Basic metabolic panel [[id number]]  (Abnormal) Collected:  03/12/19 2123    Lab Status:  Final result Specimen:  Blood from Arm, Right Updated:  03/12/19 2147     Sodium 139 mmol/L      Potassium 4 0 mmol/L      Chloride 105 mmol/L      CO2 24 mmol/L      ANION GAP 10 mmol/L      BUN 12 mg/dL      Creatinine 0 53 mg/dL      Glucose 98 mg/dL      Calcium 9 0 mg/dL      eGFR 113 ml/min/1 73sq m     Narrative:       National Kidney Disease Education Program recommendations are as follows:  GFR calculation is accurate only with a steady state creatinine  Chronic Kidney disease less than 60 ml/min/1 73 sq  meters  Kidney failure less than 15 ml/min/1 73 sq  meters      CBC and differential [[id number]]  (Abnormal) Collected:  03/12/19 2123    Lab Status:  Final result Specimen:  Blood from Arm, Right Updated:  03/12/19 2136     WBC 11 57 Thousand/uL      RBC 4 76 Million/uL      Hemoglobin 11 8 g/dL      Hematocrit 37 8 %      MCV 79 fL      MCH 24 8 pg      MCHC 31 2 g/dL      RDW 17 3 %      MPV 10 0 fL      Platelets 060 Thousands/uL      nRBC 0 /100 WBCs      Neutrophils Relative 68 %      Immat GRANS % 0 %      Lymphocytes Relative 22 %      Monocytes Relative 6 %      Eosinophils Relative 3 %      Basophils Relative 1 %      Neutrophils Absolute 8 00 Thousands/µL      Immature Grans Absolute 0 04 Thousand/uL      Lymphocytes Absolute 2 51 Thousands/µL      Monocytes Absolute 0 67 Thousand/µL      Eosinophils Absolute 0 29 Thousand/µL      Basophils Absolute 0 06 Thousands/µL                  No orders to display              Procedures  ECG 12 Lead Documentation  Date/Time: 3/12/2019 10:01 PM  Performed by: Raheel Willard PA-C  Authorized by: Raheel Willard PA-C     Indications / Diagnosis:  Dizziness  ECG reviewed by me, the ED Provider: yes    Patient location:  ED  Previous ECG:     Previous ECG:  Compared to current    Comparison ECG info:  01/10/19 NSR 69    Similarity:  No change    Comparison to cardiac monitor: Yes    Interpretation:     Interpretation: non-specific    Rate:     ECG rate:  66    ECG rate assessment: normal    Rhythm:     Rhythm: sinus rhythm    QRS:     QRS axis:  Normal  Conduction:     Conduction: normal    ST segments:     ST segments:  Normal  T waves:     T waves: normal    Comments:      No acute ST or T wave changes  QT//404  Phone Contacts  ED Phone Contact    ED Course  ED Course as of Mar 12 2303   Tunela Mar 12, 2019   2118 Patient states pain and symptoms feel similar to previous episode in January  Patient states around the time of her period in February she was having similar symptoms but did not come to the ER       2119 I did offer patient an additional CT scan this time which she declined        2209 Patient is asleep resting comfortably  Updated patient and daughter on lab findings  2223 Patient states she is feeling better, rates pain 0/10        2238 Patient ambulated to the bathroom independently without difficulty  65 Educated patient regarding diagnosis and management  Advised patient to follow up with PCP  Advised patient to RTER for persistent or worsening symptoms  MDM  Number of Diagnoses or Management Options  Dizziness: new and does not require workup  Headache: new and does not require workup  Diagnosis management comments: Differential diagnosis included but not limited to:  Tension headache, migraine, menstrual migraine, vertigo, iron deficiency anemia, dehydration, orthostatic hypotension, unlikely CVA, subdural hematoma, epidural hematoma, subarachnoid hemorrhage, meningitis, CO poisoning, hydrocephalus     EKG was obtained and independently interpreted by myself: NSR 66, no acute ST or T wave changes, QT//404  Labs WNL  Patient improved after IV Toradol, Reglan and Benadryl  Patient ambulated to the bathroom independently without difficulty or dizziness  I provided patient with strict RTER precautions  I advised patient follow-up with PCP in 24-48 hours  Patient verbalized understanding          Amount and/or Complexity of Data Reviewed  Clinical lab tests: reviewed and ordered  Review and summarize past medical records: yes    Risk of Complications, Morbidity, and/or Mortality  Presenting problems: moderate  Diagnostic procedures: moderate  Management options: moderate    Patient Progress  Patient progress: improved      Disposition  Final diagnoses:   Headache   Dizziness     Time reflects when diagnosis was documented in both MDM as applicable and the Disposition within this note     Time User Action Codes Description Comment    3/12/2019 10:47 PM Mitch Stokes Add [R51] Headache     3/12/2019 10:47 PM Mitch Stokes Add [R42] Dizziness       ED Disposition ED Disposition Condition Date/Time Comment    Discharge Stable Tunela Mar 12, 2019 10:47 PM Nirmal Garciaand discharge to home/self care  Follow-up Information     Follow up With Specialties Details Why Contact Info Additional [address] Emergency Department Emergency Medicine Go to  If symptoms worsen [address] [facility]  AN ED, [address], [place], [place], [address] [facility],  Family Medicine Schedule an appointment as soon as possible for a visit   [address] [facility]  [address]  [phone]             Patient's Medications   Discharge Prescriptions    IBUPROFEN (MOTRIN) 600 MG TABLET    Take 1 tablet (600 mg total) by mouth every 6 (six) hours as needed for mild pain, moderate pain or headaches       Start Date: 3/12/2019 End Date: --       Order Dose: 600 mg       Quantity: 14 tablet    Refills: 0    MECLIZINE (ANTIVERT) 25 MG TABLET    Take 1 tablet (25 mg total) by mouth 3 (three) times a day as needed for dizziness       Start Date: 3/12/2019 End Date: --       Order Dose: 25 mg       Quantity: 20 tablet    Refills: 0     No discharge procedures on file      ED Provider  Electronically Signed by           Deisy John PA-C  03/12/19 [id number] Raman Morales PA-C  03/12/19 0591 36.8

## 2019-12-31 ENCOUNTER — TRANSCRIBE ORDERS (OUTPATIENT)
Dept: ADMINISTRATIVE | Facility: HOSPITAL | Age: 49
End: 2019-12-31

## 2019-12-31 DIAGNOSIS — R10.2 PELVIC PAIN: Primary | ICD-10-CM

## 2019-12-31 DIAGNOSIS — Z12.31 SCREENING MAMMOGRAM, ENCOUNTER FOR: ICD-10-CM

## 2020-01-03 ENCOUNTER — HOSPITAL ENCOUNTER (OUTPATIENT)
Dept: ULTRASOUND IMAGING | Facility: HOSPITAL | Age: 50
Discharge: HOME | End: 2020-01-03

## 2020-01-03 ENCOUNTER — HOSPITAL ENCOUNTER (OUTPATIENT)
Dept: ULTRASOUND IMAGING | Facility: HOSPITAL | Age: 50
Discharge: HOME | End: 2020-01-03
Payer: COMMERCIAL

## 2020-01-03 DIAGNOSIS — R10.2 PELVIC PAIN: ICD-10-CM

## 2020-01-03 PROCEDURE — 76830 TRANSVAGINAL US NON-OB: CPT

## 2020-01-03 PROCEDURE — 76856 US EXAM PELVIC COMPLETE: CPT

## 2020-01-16 ENCOUNTER — OFFICE VISIT (OUTPATIENT)
Dept: FAMILY MEDICINE CLINIC | Facility: CLINIC | Age: 50
End: 2020-01-16
Payer: COMMERCIAL

## 2020-01-16 VITALS
WEIGHT: 180 LBS | TEMPERATURE: 98 F | DIASTOLIC BLOOD PRESSURE: 70 MMHG | HEIGHT: 62 IN | RESPIRATION RATE: 16 BRPM | HEART RATE: 83 BPM | SYSTOLIC BLOOD PRESSURE: 120 MMHG | BODY MASS INDEX: 33.13 KG/M2 | OXYGEN SATURATION: 98 %

## 2020-01-16 DIAGNOSIS — Z13.6 SCREENING FOR CARDIOVASCULAR CONDITION: ICD-10-CM

## 2020-01-16 DIAGNOSIS — R09.81 NASAL CONGESTION: ICD-10-CM

## 2020-01-16 DIAGNOSIS — L29.9 EAR ITCHING: Primary | ICD-10-CM

## 2020-01-16 DIAGNOSIS — D50.0 IRON DEFICIENCY ANEMIA DUE TO CHRONIC BLOOD LOSS: ICD-10-CM

## 2020-01-16 PROCEDURE — 3008F BODY MASS INDEX DOCD: CPT | Performed by: FAMILY MEDICINE

## 2020-01-16 PROCEDURE — 99204 OFFICE O/P NEW MOD 45 MIN: CPT | Performed by: FAMILY MEDICINE

## 2020-01-16 RX ORDER — TRIAMCINOLONE ACETONIDE 0.25 MG/G
CREAM TOPICAL 2 TIMES DAILY
Qty: 30 G | Refills: 0 | Status: SHIPPED | OUTPATIENT
Start: 2020-01-16

## 2020-01-16 RX ORDER — FLUTICASONE PROPIONATE 50 MCG
2 SPRAY, SUSPENSION (ML) NASAL DAILY
Qty: 16 G | Refills: 1 | Status: SHIPPED | OUTPATIENT
Start: 2020-01-16

## 2020-01-16 NOTE — PROGRESS NOTES
BMI Counseling: Body mass index is 32 92 kg/m²  The BMI is above normal  Nutrition recommendations include decreasing portion sizes, encouraging healthy choices of fruits and vegetables, consuming healthier snacks, limiting drinks that contain sugar, moderation in carbohydrate intake, increasing intake of lean protein and reducing intake of cholesterol  Exercise recommendations include exercising 3-5 times per week  Assessment/Plan:    Problem List Items Addressed This Visit        Other    Ear itching - Primary    Relevant Medications    triamcinolone (KENALOG) 0 025 % cream  No  infection, advised to use outside with tip of finger topical steroid for few days and she can avoid ear plugs and she can put head forms instead to avoid itching    Nasal congestion    Relevant Medications    fluticasone (FLONASE) 50 mcg/act nasal spray    Iron deficiency anemia due to chronic blood loss    Relevant Orders    CBC and differential    Ferritin    Screening for cardiovascular condition    Relevant Orders    CBC and differential    Comprehensive metabolic panel    Lipid panel    TSH, 3rd generation          Chief Complaint   Patient presents with    Establish Care       Subjective:   Patient ID: Tania Ventura is a 52 y o  female  She is a new patient complaining of itching in both ears for years and 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 and she thinks it could be due to that,, she has no ear pain or discharge, hearing is normal, she has history of vertigo but she is not on any medication she was on some medicine in the past and she does not take anything now    She takes iron for anemia, she follows gynecologist and due to heavy menstrual   She gave her some medication to reduce the blood flow she but she has not started yet,  And shin she has no history of diabetes hypertension and she is nondrinker no smoking  She speaks , her daughter is here to translate        Review of Systems Constitutional: Negative for activity change, appetite change, chills, diaphoresis, fatigue, fever and unexpected weight change  HENT: Negative for congestion, dental problem, ear discharge, ear pain, facial swelling, hearing loss, mouth sores, nosebleeds, postnasal drip, rhinorrhea, sinus pressure, sinus pain, sneezing, sore throat, trouble swallowing and voice change  Ear itching    Eyes: Negative for photophobia, pain, discharge, redness and itching  Respiratory: Negative for cough, chest tightness, shortness of breath and wheezing  Cardiovascular: Negative for chest pain, palpitations and leg swelling  Gastrointestinal: Negative for abdominal distention, abdominal pain, blood in stool, constipation, diarrhea and nausea  Endocrine: Negative for cold intolerance, heat intolerance, polydipsia, polyphagia and polyuria  Genitourinary: Negative for dysuria, flank pain, frequency, hematuria and urgency  Musculoskeletal: Negative for arthralgias, back pain, myalgias and neck pain  Skin: Negative for color change and pallor  Allergic/Immunologic: Negative for environmental allergies and food allergies  Neurological: Negative for dizziness, weakness, light-headedness, numbness and headaches  Hematological: Negative for adenopathy  Does not bruise/bleed easily  Psychiatric/Behavioral: Negative for behavioral problems, sleep disturbance and suicidal ideas  The patient is not nervous/anxious  Objective:  Physical Exam   Constitutional: She is oriented to person, place, and time  She appears well-developed and well-nourished  HENT:   Head: Normocephalic and atraumatic  Right Ear: External ear normal    Left Ear: External ear normal    Nose: Nose normal    Mouth/Throat: Oropharynx is clear and moist  No oropharyngeal exudate  Eyes: Pupils are equal, round, and reactive to light  Conjunctivae and EOM are normal  Right eye exhibits no discharge  Left eye exhibits no discharge   No scleral icterus  Neck: Normal range of motion  Neck supple  No tracheal deviation present  No thyromegaly present  Cardiovascular: Normal rate, regular rhythm and normal heart sounds  No murmur heard  Pulmonary/Chest: Effort normal and breath sounds normal  No respiratory distress  She has no wheezes  She has no rales  Abdominal: Soft  Bowel sounds are normal  She exhibits no distension and no mass  There is no tenderness  There is no rebound  Musculoskeletal: Normal range of motion  She exhibits no edema  Lymphadenopathy:     She has no cervical adenopathy  Neurological: She is alert and oriented to person, place, and time  She has normal reflexes  No cranial nerve deficit  Skin: Skin is warm  No rash noted  No erythema  No pallor  Psychiatric: She has a normal mood and affect  Her behavior is normal  Judgment and thought content normal    Nursing note and vitals reviewed           Past Surgical History:   Procedure Laterality Date    CHOLECYSTECTOMY         Family History   Problem Relation Age of Onset    Hyperlipidemia Mother     No Known Problems Father          Current Outpatient Medications:     fluticasone (FLONASE) 50 mcg/act nasal spray, 2 sprays into each nostril daily, Disp: 16 g, Rfl: 1    triamcinolone (KENALOG) 0 025 % cream, Apply topically 2 (two) times a day, Disp: 30 g, Rfl: 0    Allergies   Allergen Reactions    Aspirin Palpitations       Vitals:    01/16/20 1015   BP: 120/70   Pulse: 83   Resp: 16   Temp: 98 °F (36 7 °C)   SpO2: 98%   Weight: 81 6 kg (180 lb)   Height: 5' 2" (1 575 m)

## 2020-01-23 ENCOUNTER — APPOINTMENT (OUTPATIENT)
Dept: LAB | Facility: CLINIC | Age: 50
End: 2020-01-23
Payer: COMMERCIAL

## 2020-01-23 DIAGNOSIS — D50.0 IRON DEFICIENCY ANEMIA DUE TO CHRONIC BLOOD LOSS: ICD-10-CM

## 2020-01-23 DIAGNOSIS — Z13.6 SCREENING FOR CARDIOVASCULAR CONDITION: ICD-10-CM

## 2020-01-23 LAB
ALBUMIN SERPL BCP-MCNC: 3.6 G/DL (ref 3.5–5)
ALP SERPL-CCNC: 82 U/L (ref 46–116)
ALT SERPL W P-5'-P-CCNC: 15 U/L (ref 12–78)
ANION GAP SERPL CALCULATED.3IONS-SCNC: 9 MMOL/L (ref 4–13)
AST SERPL W P-5'-P-CCNC: 10 U/L (ref 5–45)
BASOPHILS # BLD AUTO: 0.04 THOUSANDS/ΜL (ref 0–0.1)
BASOPHILS NFR BLD AUTO: 1 % (ref 0–1)
BILIRUB SERPL-MCNC: 0.45 MG/DL (ref 0.2–1)
BUN SERPL-MCNC: 8 MG/DL (ref 5–25)
CALCIUM SERPL-MCNC: 8.8 MG/DL (ref 8.3–10.1)
CHLORIDE SERPL-SCNC: 105 MMOL/L (ref 100–108)
CHOLEST SERPL-MCNC: 138 MG/DL (ref 50–200)
CO2 SERPL-SCNC: 26 MMOL/L (ref 21–32)
CREAT SERPL-MCNC: 0.67 MG/DL (ref 0.6–1.3)
EOSINOPHIL # BLD AUTO: 0.23 THOUSAND/ΜL (ref 0–0.61)
EOSINOPHIL NFR BLD AUTO: 3 % (ref 0–6)
ERYTHROCYTE [DISTWIDTH] IN BLOOD BY AUTOMATED COUNT: 14.2 % (ref 11.6–15.1)
FERRITIN SERPL-MCNC: 22 NG/ML (ref 8–388)
GFR SERPL CREATININE-BSD FRML MDRD: 104 ML/MIN/1.73SQ M
GLUCOSE P FAST SERPL-MCNC: 88 MG/DL (ref 65–99)
HCT VFR BLD AUTO: 41 % (ref 34.8–46.1)
HDLC SERPL-MCNC: 63 MG/DL
HGB BLD-MCNC: 13.1 G/DL (ref 11.5–15.4)
IMM GRANULOCYTES # BLD AUTO: 0.03 THOUSAND/UL (ref 0–0.2)
IMM GRANULOCYTES NFR BLD AUTO: 0 % (ref 0–2)
LDLC SERPL CALC-MCNC: 65 MG/DL (ref 0–100)
LYMPHOCYTES # BLD AUTO: 2.31 THOUSANDS/ΜL (ref 0.6–4.47)
LYMPHOCYTES NFR BLD AUTO: 27 % (ref 14–44)
MCH RBC QN AUTO: 27.3 PG (ref 26.8–34.3)
MCHC RBC AUTO-ENTMCNC: 32 G/DL (ref 31.4–37.4)
MCV RBC AUTO: 86 FL (ref 82–98)
MONOCYTES # BLD AUTO: 0.4 THOUSAND/ΜL (ref 0.17–1.22)
MONOCYTES NFR BLD AUTO: 5 % (ref 4–12)
NEUTROPHILS # BLD AUTO: 5.67 THOUSANDS/ΜL (ref 1.85–7.62)
NEUTS SEG NFR BLD AUTO: 64 % (ref 43–75)
NONHDLC SERPL-MCNC: 75 MG/DL
NRBC BLD AUTO-RTO: 0 /100 WBCS
PLATELET # BLD AUTO: 333 THOUSANDS/UL (ref 149–390)
PMV BLD AUTO: 10.4 FL (ref 8.9–12.7)
POTASSIUM SERPL-SCNC: 3.7 MMOL/L (ref 3.5–5.3)
PROT SERPL-MCNC: 7.8 G/DL (ref 6.4–8.2)
RBC # BLD AUTO: 4.79 MILLION/UL (ref 3.81–5.12)
SODIUM SERPL-SCNC: 140 MMOL/L (ref 136–145)
TRIGL SERPL-MCNC: 48 MG/DL
TSH SERPL DL<=0.05 MIU/L-ACNC: 1.58 UIU/ML (ref 0.36–3.74)
WBC # BLD AUTO: 8.68 THOUSAND/UL (ref 4.31–10.16)

## 2020-01-23 PROCEDURE — 80061 LIPID PANEL: CPT

## 2020-01-23 PROCEDURE — 36415 COLL VENOUS BLD VENIPUNCTURE: CPT

## 2020-01-23 PROCEDURE — 80053 COMPREHEN METABOLIC PANEL: CPT

## 2020-01-23 PROCEDURE — 82728 ASSAY OF FERRITIN: CPT

## 2020-01-23 PROCEDURE — 85025 COMPLETE CBC W/AUTO DIFF WBC: CPT

## 2020-01-23 PROCEDURE — 84443 ASSAY THYROID STIM HORMONE: CPT

## 2020-02-05 ENCOUNTER — OFFICE VISIT (OUTPATIENT)
Dept: FAMILY MEDICINE CLINIC | Facility: CLINIC | Age: 50
End: 2020-02-05
Payer: COMMERCIAL

## 2020-02-05 VITALS
SYSTOLIC BLOOD PRESSURE: 120 MMHG | HEIGHT: 62 IN | BODY MASS INDEX: 33.31 KG/M2 | RESPIRATION RATE: 16 BRPM | OXYGEN SATURATION: 98 % | HEART RATE: 80 BPM | WEIGHT: 181 LBS | DIASTOLIC BLOOD PRESSURE: 70 MMHG

## 2020-02-05 DIAGNOSIS — R42 DIZZINESS: ICD-10-CM

## 2020-02-05 DIAGNOSIS — R07.2 PRECORDIAL PAIN: Primary | ICD-10-CM

## 2020-02-05 DIAGNOSIS — R26.89 BALANCE PROBLEM: ICD-10-CM

## 2020-02-05 DIAGNOSIS — R09.81 NASAL CONGESTION: ICD-10-CM

## 2020-02-05 DIAGNOSIS — D50.0 IRON DEFICIENCY ANEMIA DUE TO CHRONIC BLOOD LOSS: ICD-10-CM

## 2020-02-05 PROBLEM — L29.9 EAR ITCHING: Status: RESOLVED | Noted: 2020-01-16 | Resolved: 2020-02-05

## 2020-02-05 PROCEDURE — 3008F BODY MASS INDEX DOCD: CPT | Performed by: FAMILY MEDICINE

## 2020-02-05 PROCEDURE — 1036F TOBACCO NON-USER: CPT | Performed by: FAMILY MEDICINE

## 2020-02-05 PROCEDURE — 99214 OFFICE O/P EST MOD 30 MIN: CPT | Performed by: FAMILY MEDICINE

## 2020-02-05 NOTE — ASSESSMENT & PLAN NOTE
She has previous EKG as she complained of chest pain at that time,    Now she says same symptom continue she feels pain coming from the back to the front chest x-ray also was done which was normal   She feels dizzy intermittently advised to see cardiologist for further workup

## 2020-02-05 NOTE — PROGRESS NOTES
Assessment/Plan:    Problem List Items Addressed This Visit        Other    Nasal congestion     Resolved with Flonase         Iron deficiency anemia due to chronic blood loss    Precordial pain - Primary     She has previous EKG as she complained of chest pain at that time,  Now she says same symptom continue she feels pain coming from the back to the front chest x-ray also was done which was normal   She feels dizzy intermittently advised to see cardiologist for further workup         Relevant Orders    Ambulatory referral to Cardiology    Dizziness     Dizziness on walking and standing, advised to use her glasses all the time as she does not wear glasses which is recommended  And she will also get her cardiac evaluation and advised to have balance therapy by physical therapist         Balance problem     Referred to physical therapy         Relevant Orders    Ambulatory referral to Physical Therapy          labs reviewed are normal  Ferritin is on lower side of normal, advised to continue over-the-counter iron to keep her hemoglobin stable  Chief Complaint   Patient presents with    Follow-up       Subjective:   Patient ID: Johnie Fontana is a 52 y o  female  She is here follow-up on her labs, she came with her daughter, she says that she feels dizzy intermittently mostly when she is standing and walking, no dizziness on rest, she also feels stabbing type of sharp pain in the chest which comes from the back to the front on the left side intermittently without any exertion, she does not feel short of breath or dizzy at that time  She says in the past she has EKG which was normal, she has no history of smoking, she feels off balance sometimes when she is walking, she says she wear glasses but not all the time and her eye exam was almost a year ago    She has been using nasal spray and her nasal congestion is better now, her EAR itching is resolved  She has heavy periods she feel the blood flow is heavier than normal and she follows gynecologist and she is on medications  She used to be on iron over-the-counter  Her recent blood work shows normal hemoglobin      Review of Systems   Constitutional: Negative for activity change, appetite change, chills, diaphoresis, fatigue, fever and unexpected weight change  HENT: Negative for congestion, dental problem, ear discharge, ear pain, facial swelling, hearing loss, mouth sores, nosebleeds, postnasal drip, rhinorrhea, sinus pressure, sinus pain, sneezing, sore throat, trouble swallowing and voice change  Eyes: Negative for photophobia, pain, discharge, redness and itching  Respiratory: Negative for cough, chest tightness, shortness of breath and wheezing  Cardiovascular: Positive for chest pain  Negative for palpitations and leg swelling  Gastrointestinal: Negative for abdominal distention, abdominal pain, blood in stool, constipation, diarrhea and nausea  Endocrine: Negative for cold intolerance, heat intolerance, polydipsia, polyphagia and polyuria  Genitourinary: Negative for dysuria, flank pain, frequency, hematuria and urgency  Musculoskeletal: Negative for arthralgias, back pain, myalgias and neck pain  Off balance feeling   Skin: Negative for color change and pallor  Allergic/Immunologic: Negative for environmental allergies and food allergies  Neurological: Positive for dizziness  Negative for weakness, light-headedness, numbness and headaches  Hematological: Negative for adenopathy  Does not bruise/bleed easily  Psychiatric/Behavioral: Negative for behavioral problems, sleep disturbance and suicidal ideas  The patient is not nervous/anxious  Objective:  Physical Exam   Constitutional: She is oriented to person, place, and time  She appears well-developed and well-nourished  HENT:   Head: Normocephalic and atraumatic     Right Ear: External ear normal    Left Ear: External ear normal    Nose: Nose normal  Mouth/Throat: Oropharynx is clear and moist  No oropharyngeal exudate  Eyes: Pupils are equal, round, and reactive to light  Conjunctivae and EOM are normal  Right eye exhibits no discharge  Left eye exhibits no discharge  No scleral icterus  Neck: Normal range of motion  Neck supple  No tracheal deviation present  No thyromegaly present  Cardiovascular: Normal rate, regular rhythm and normal heart sounds  No murmur heard  Pulmonary/Chest: Effort normal and breath sounds normal  No respiratory distress  She has no wheezes  She has no rales  Abdominal: Soft  Bowel sounds are normal  She exhibits no distension and no mass  There is no tenderness  There is no rebound  Musculoskeletal: Normal range of motion  She exhibits no edema  Lymphadenopathy:     She has no cervical adenopathy  Neurological: She is alert and oriented to person, place, and time  She has normal reflexes  No cranial nerve deficit  Skin: Skin is warm  No rash noted  No erythema  No pallor  Psychiatric: She has a normal mood and affect  Her behavior is normal  Judgment and thought content normal    Nursing note and vitals reviewed           Past Surgical History:   Procedure Laterality Date    CHOLECYSTECTOMY         Family History   Problem Relation Age of Onset    Hyperlipidemia Mother     No Known Problems Father          Current Outpatient Medications:     fluticasone (FLONASE) 50 mcg/act nasal spray, 2 sprays into each nostril daily, Disp: 16 g, Rfl: 1    megestrol (MEGACE) 40 mg tablet, , Disp: , Rfl:     Tranexamic Acid 650 MG TABS, , Disp: , Rfl:     triamcinolone (KENALOG) 0 025 % cream, Apply topically 2 (two) times a day, Disp: 30 g, Rfl: 0    Allergies   Allergen Reactions    Aspirin Palpitations       Vitals:    02/05/20 1036   BP: 120/70   Pulse: 80   Resp: 16   SpO2: 98%   Weight: 82 1 kg (181 lb)   Height: 5' 2" (1 575 m)

## 2020-02-05 NOTE — ASSESSMENT & PLAN NOTE
Dizziness on walking and standing, advised to use her glasses all the time as she does not wear glasses which is recommended    And she will also get her cardiac evaluation and advised to have balance therapy by physical therapist

## 2020-05-21 ENCOUNTER — TELEMEDICINE (OUTPATIENT)
Dept: FAMILY MEDICINE CLINIC | Facility: CLINIC | Age: 50
End: 2020-05-21
Payer: COMMERCIAL

## 2020-05-21 DIAGNOSIS — G44.209 TENSION-TYPE HEADACHE, NOT INTRACTABLE, UNSPECIFIED CHRONICITY PATTERN: ICD-10-CM

## 2020-05-21 DIAGNOSIS — D50.0 IRON DEFICIENCY ANEMIA DUE TO CHRONIC BLOOD LOSS: Primary | ICD-10-CM

## 2020-05-21 DIAGNOSIS — R42 DIZZINESS: ICD-10-CM

## 2020-05-21 PROCEDURE — 99213 OFFICE O/P EST LOW 20 MIN: CPT | Performed by: FAMILY MEDICINE

## 2020-05-22 ENCOUNTER — APPOINTMENT (OUTPATIENT)
Dept: LAB | Facility: CLINIC | Age: 50
End: 2020-05-22
Payer: COMMERCIAL

## 2020-05-22 DIAGNOSIS — R42 DIZZINESS: ICD-10-CM

## 2020-05-22 DIAGNOSIS — D50.0 IRON DEFICIENCY ANEMIA DUE TO CHRONIC BLOOD LOSS: ICD-10-CM

## 2020-05-22 LAB
ALBUMIN SERPL BCP-MCNC: 3.8 G/DL (ref 3.5–5)
ALP SERPL-CCNC: 80 U/L (ref 46–116)
ALT SERPL W P-5'-P-CCNC: 23 U/L (ref 12–78)
ANION GAP SERPL CALCULATED.3IONS-SCNC: 5 MMOL/L (ref 4–13)
AST SERPL W P-5'-P-CCNC: 11 U/L (ref 5–45)
BASOPHILS # BLD AUTO: 0.07 THOUSANDS/ΜL (ref 0–0.1)
BASOPHILS NFR BLD AUTO: 1 % (ref 0–1)
BILIRUB SERPL-MCNC: 0.31 MG/DL (ref 0.2–1)
BUN SERPL-MCNC: 8 MG/DL (ref 5–25)
CALCIUM SERPL-MCNC: 8.5 MG/DL (ref 8.3–10.1)
CHLORIDE SERPL-SCNC: 104 MMOL/L (ref 100–108)
CO2 SERPL-SCNC: 27 MMOL/L (ref 21–32)
CREAT SERPL-MCNC: 0.58 MG/DL (ref 0.6–1.3)
EOSINOPHIL # BLD AUTO: 0.18 THOUSAND/ΜL (ref 0–0.61)
EOSINOPHIL NFR BLD AUTO: 2 % (ref 0–6)
ERYTHROCYTE [DISTWIDTH] IN BLOOD BY AUTOMATED COUNT: 14.2 % (ref 11.6–15.1)
GFR SERPL CREATININE-BSD FRML MDRD: 109 ML/MIN/1.73SQ M
GLUCOSE P FAST SERPL-MCNC: 81 MG/DL (ref 65–99)
HCT VFR BLD AUTO: 40 % (ref 34.8–46.1)
HGB BLD-MCNC: 12.9 G/DL (ref 11.5–15.4)
IMM GRANULOCYTES # BLD AUTO: 0.01 THOUSAND/UL (ref 0–0.2)
IMM GRANULOCYTES NFR BLD AUTO: 0 % (ref 0–2)
LYMPHOCYTES # BLD AUTO: 2.33 THOUSANDS/ΜL (ref 0.6–4.47)
LYMPHOCYTES NFR BLD AUTO: 29 % (ref 14–44)
MCH RBC QN AUTO: 28.2 PG (ref 26.8–34.3)
MCHC RBC AUTO-ENTMCNC: 32.3 G/DL (ref 31.4–37.4)
MCV RBC AUTO: 87 FL (ref 82–98)
MONOCYTES # BLD AUTO: 0.33 THOUSAND/ΜL (ref 0.17–1.22)
MONOCYTES NFR BLD AUTO: 4 % (ref 4–12)
NEUTROPHILS # BLD AUTO: 5.11 THOUSANDS/ΜL (ref 1.85–7.62)
NEUTS SEG NFR BLD AUTO: 64 % (ref 43–75)
NRBC BLD AUTO-RTO: 0 /100 WBCS
PLATELET # BLD AUTO: 371 THOUSANDS/UL (ref 149–390)
PMV BLD AUTO: 11 FL (ref 8.9–12.7)
POTASSIUM SERPL-SCNC: 4 MMOL/L (ref 3.5–5.3)
PROT SERPL-MCNC: 7.9 G/DL (ref 6.4–8.2)
RBC # BLD AUTO: 4.58 MILLION/UL (ref 3.81–5.12)
SODIUM SERPL-SCNC: 136 MMOL/L (ref 136–145)
WBC # BLD AUTO: 8.03 THOUSAND/UL (ref 4.31–10.16)

## 2020-05-22 PROCEDURE — 36415 COLL VENOUS BLD VENIPUNCTURE: CPT

## 2020-05-22 PROCEDURE — 85025 COMPLETE CBC W/AUTO DIFF WBC: CPT

## 2020-05-22 PROCEDURE — 80053 COMPREHEN METABOLIC PANEL: CPT

## 2020-05-28 ENCOUNTER — TELEPHONE (OUTPATIENT)
Dept: FAMILY MEDICINE CLINIC | Facility: CLINIC | Age: 50
End: 2020-05-28

## 2020-06-29 ENCOUNTER — HOSPITAL ENCOUNTER (EMERGENCY)
Facility: HOSPITAL | Age: 50
Discharge: HOME | End: 2020-06-29
Attending: EMERGENCY MEDICINE | Admitting: EMERGENCY MEDICINE
Payer: COMMERCIAL

## 2020-06-29 ENCOUNTER — APPOINTMENT (EMERGENCY)
Dept: RADIOLOGY | Facility: HOSPITAL | Age: 50
End: 2020-06-29
Payer: COMMERCIAL

## 2020-06-29 VITALS
OXYGEN SATURATION: 99 % | DIASTOLIC BLOOD PRESSURE: 79 MMHG | TEMPERATURE: 98.4 F | RESPIRATION RATE: 19 BRPM | SYSTOLIC BLOOD PRESSURE: 126 MMHG | WEIGHT: 197.53 LBS | HEART RATE: 82 BPM | BODY MASS INDEX: 36.13 KG/M2

## 2020-06-29 DIAGNOSIS — R07.9 CHEST PAIN: Primary | ICD-10-CM

## 2020-06-29 LAB
ANION GAP SERPL CALCULATED.3IONS-SCNC: 9 MMOL/L (ref 4–13)
APTT PPP: 35 SECONDS (ref 23–37)
ATRIAL RATE: 91 BPM
BASOPHILS # BLD AUTO: 0.06 THOUSANDS/ΜL (ref 0–0.1)
BASOPHILS NFR BLD AUTO: 1 % (ref 0–1)
BUN SERPL-MCNC: 9 MG/DL (ref 5–25)
CALCIUM SERPL-MCNC: 8.5 MG/DL (ref 8.3–10.1)
CHLORIDE SERPL-SCNC: 103 MMOL/L (ref 100–108)
CO2 SERPL-SCNC: 26 MMOL/L (ref 21–32)
CREAT SERPL-MCNC: 0.62 MG/DL (ref 0.6–1.3)
D DIMER PPP FEU-MCNC: 0.28 UG/ML FEU
EOSINOPHIL # BLD AUTO: 0.11 THOUSAND/ΜL (ref 0–0.61)
EOSINOPHIL NFR BLD AUTO: 1 % (ref 0–6)
ERYTHROCYTE [DISTWIDTH] IN BLOOD BY AUTOMATED COUNT: 13.7 % (ref 11.6–15.1)
GFR SERPL CREATININE-BSD FRML MDRD: 106 ML/MIN/1.73SQ M
GLUCOSE SERPL-MCNC: 89 MG/DL (ref 65–140)
HCT VFR BLD AUTO: 37.7 % (ref 34.8–46.1)
HGB BLD-MCNC: 12.7 G/DL (ref 11.5–15.4)
IMM GRANULOCYTES # BLD AUTO: 0.05 THOUSAND/UL (ref 0–0.2)
IMM GRANULOCYTES NFR BLD AUTO: 0 % (ref 0–2)
INR PPP: 0.98 (ref 0.84–1.19)
LYMPHOCYTES # BLD AUTO: 2.43 THOUSANDS/ΜL (ref 0.6–4.47)
LYMPHOCYTES NFR BLD AUTO: 21 % (ref 14–44)
MCH RBC QN AUTO: 27.9 PG (ref 26.8–34.3)
MCHC RBC AUTO-ENTMCNC: 33.7 G/DL (ref 31.4–37.4)
MCV RBC AUTO: 83 FL (ref 82–98)
MONOCYTES # BLD AUTO: 0.64 THOUSAND/ΜL (ref 0.17–1.22)
MONOCYTES NFR BLD AUTO: 6 % (ref 4–12)
NEUTROPHILS # BLD AUTO: 8.32 THOUSANDS/ΜL (ref 1.85–7.62)
NEUTS SEG NFR BLD AUTO: 71 % (ref 43–75)
NRBC BLD AUTO-RTO: 0 /100 WBCS
NT-PROBNP SERPL-MCNC: 43 PG/ML
P AXIS: 31 DEGREES
PLATELET # BLD AUTO: 365 THOUSANDS/UL (ref 149–390)
PMV BLD AUTO: 10.2 FL (ref 8.9–12.7)
POTASSIUM SERPL-SCNC: 3.7 MMOL/L (ref 3.5–5.3)
PR INTERVAL: 140 MS
PROTHROMBIN TIME: 13.3 SECONDS (ref 11.6–14.5)
QRS AXIS: -29 DEGREES
QRSD INTERVAL: 86 MS
QT INTERVAL: 362 MS
QTC INTERVAL: 445 MS
RBC # BLD AUTO: 4.55 MILLION/UL (ref 3.81–5.12)
SODIUM SERPL-SCNC: 138 MMOL/L (ref 136–145)
T WAVE AXIS: 14 DEGREES
TROPONIN I SERPL-MCNC: <0.02 NG/ML
TROPONIN I SERPL-MCNC: <0.02 NG/ML
VENTRICULAR RATE: 91 BPM
WBC # BLD AUTO: 11.61 THOUSAND/UL (ref 4.31–10.16)

## 2020-06-29 PROCEDURE — 85379 FIBRIN DEGRADATION QUANT: CPT | Performed by: PHYSICIAN ASSISTANT

## 2020-06-29 PROCEDURE — 85025 COMPLETE CBC W/AUTO DIFF WBC: CPT | Performed by: PHYSICIAN ASSISTANT

## 2020-06-29 PROCEDURE — 93005 ELECTROCARDIOGRAM TRACING: CPT

## 2020-06-29 PROCEDURE — 93010 ELECTROCARDIOGRAM REPORT: CPT | Performed by: INTERNAL MEDICINE

## 2020-06-29 PROCEDURE — 36415 COLL VENOUS BLD VENIPUNCTURE: CPT | Performed by: PHYSICIAN ASSISTANT

## 2020-06-29 PROCEDURE — 71045 X-RAY EXAM CHEST 1 VIEW: CPT

## 2020-06-29 PROCEDURE — 96374 THER/PROPH/DIAG INJ IV PUSH: CPT

## 2020-06-29 PROCEDURE — 85610 PROTHROMBIN TIME: CPT | Performed by: PHYSICIAN ASSISTANT

## 2020-06-29 PROCEDURE — 84484 ASSAY OF TROPONIN QUANT: CPT | Performed by: PHYSICIAN ASSISTANT

## 2020-06-29 PROCEDURE — 80048 BASIC METABOLIC PNL TOTAL CA: CPT | Performed by: PHYSICIAN ASSISTANT

## 2020-06-29 PROCEDURE — 85730 THROMBOPLASTIN TIME PARTIAL: CPT | Performed by: PHYSICIAN ASSISTANT

## 2020-06-29 PROCEDURE — 83880 ASSAY OF NATRIURETIC PEPTIDE: CPT | Performed by: PHYSICIAN ASSISTANT

## 2020-06-29 PROCEDURE — 99285 EMERGENCY DEPT VISIT HI MDM: CPT | Performed by: PHYSICIAN ASSISTANT

## 2020-06-29 PROCEDURE — 99285 EMERGENCY DEPT VISIT HI MDM: CPT

## 2020-06-29 RX ORDER — CYCLOBENZAPRINE HCL 10 MG
10 TABLET ORAL 2 TIMES DAILY PRN
Qty: 10 TABLET | Refills: 0 | Status: SHIPPED | OUTPATIENT
Start: 2020-06-29 | End: 2021-07-27 | Stop reason: ALTCHOICE

## 2020-06-29 RX ORDER — KETOROLAC TROMETHAMINE 30 MG/ML
30 INJECTION, SOLUTION INTRAMUSCULAR; INTRAVENOUS ONCE
Status: COMPLETED | OUTPATIENT
Start: 2020-06-29 | End: 2020-06-29

## 2020-06-29 RX ADMIN — KETOROLAC TROMETHAMINE 30 MG: 30 INJECTION, SOLUTION INTRAMUSCULAR at 15:56

## 2021-06-08 ENCOUNTER — TRANSCRIBE ORDERS (OUTPATIENT)
Dept: ADMINISTRATIVE | Facility: HOSPITAL | Age: 51
End: 2021-06-08

## 2021-06-08 DIAGNOSIS — R52 PAIN: Primary | ICD-10-CM

## 2021-06-11 ENCOUNTER — HOSPITAL ENCOUNTER (OUTPATIENT)
Dept: ULTRASOUND IMAGING | Facility: HOSPITAL | Age: 51
Discharge: HOME | End: 2021-06-11
Payer: COMMERCIAL

## 2021-06-11 DIAGNOSIS — R10.2 PELVIC PAIN: ICD-10-CM

## 2021-06-11 DIAGNOSIS — R52 PAIN: ICD-10-CM

## 2021-06-11 PROCEDURE — 76830 TRANSVAGINAL US NON-OB: CPT

## 2021-06-11 PROCEDURE — 76856 US EXAM PELVIC COMPLETE: CPT

## 2021-06-11 PROCEDURE — 76700 US EXAM ABDOM COMPLETE: CPT

## 2021-06-16 ENCOUNTER — TELEPHONE (OUTPATIENT)
Dept: GASTROENTEROLOGY | Facility: CLINIC | Age: 51
End: 2021-06-16

## 2021-06-16 NOTE — TELEPHONE ENCOUNTER
Daughter left message asking for a call regarding her mother  She couldn't remember the name of the doctor her mother saw  She saw Dr Juany Cloud in 2015 last and was due for a colonoscopy in December of 2020  I returned her call and had to leave a message for her to call back

## 2021-06-25 ENCOUNTER — OFFICE VISIT (OUTPATIENT)
Dept: GASTROENTEROLOGY | Facility: CLINIC | Age: 51
End: 2021-06-25
Payer: COMMERCIAL

## 2021-06-25 VITALS
HEIGHT: 61 IN | WEIGHT: 171 LBS | SYSTOLIC BLOOD PRESSURE: 122 MMHG | BODY MASS INDEX: 32.28 KG/M2 | HEART RATE: 77 BPM | DIASTOLIC BLOOD PRESSURE: 84 MMHG

## 2021-06-25 DIAGNOSIS — Z86.19 HISTORY OF HELICOBACTER PYLORI INFECTION: ICD-10-CM

## 2021-06-25 DIAGNOSIS — K21.9 GASTROESOPHAGEAL REFLUX DISEASE, UNSPECIFIED WHETHER ESOPHAGITIS PRESENT: Primary | ICD-10-CM

## 2021-06-25 DIAGNOSIS — D50.0 IRON DEFICIENCY ANEMIA DUE TO CHRONIC BLOOD LOSS: ICD-10-CM

## 2021-06-25 DIAGNOSIS — Z86.010 HISTORY OF COLON POLYPS: ICD-10-CM

## 2021-06-25 PROCEDURE — 1036F TOBACCO NON-USER: CPT | Performed by: NURSE PRACTITIONER

## 2021-06-25 PROCEDURE — 3008F BODY MASS INDEX DOCD: CPT | Performed by: NURSE PRACTITIONER

## 2021-06-25 PROCEDURE — 99214 OFFICE O/P EST MOD 30 MIN: CPT | Performed by: NURSE PRACTITIONER

## 2021-06-25 RX ORDER — PANTOPRAZOLE SODIUM 40 MG/1
40 TABLET, DELAYED RELEASE ORAL DAILY
Qty: 30 TABLET | Refills: 1 | Status: SHIPPED | OUTPATIENT
Start: 2021-06-25 | End: 2021-07-27 | Stop reason: SDUPTHER

## 2021-06-25 RX ORDER — CIPROFLOXACIN 500 MG/1
500 TABLET, FILM COATED ORAL 2 TIMES DAILY
COMMUNITY
End: 2021-08-19 | Stop reason: ALTCHOICE

## 2021-06-25 RX ORDER — METRONIDAZOLE 500 MG/1
500 TABLET ORAL 3 TIMES DAILY
COMMUNITY

## 2021-06-25 RX ORDER — ASCORBIC ACID 500 MG
500 TABLET ORAL DAILY
COMMUNITY

## 2021-06-25 RX ORDER — FERROUS SULFATE 220 (44)/5
ELIXIR ORAL DAILY
COMMUNITY

## 2021-06-25 NOTE — PATIENT INSTRUCTIONS
Dieta para úlceras estomacales y gastritis   LO QUE NECESITA SABER:   Maryanne dieta para úlceras estomacales y gastritis es un plan alimenticio que limita los alimentos que irritan perez SIENNAURHOLM  Ciertos alimentos [employer] síntomas byron dolor de KAY, [place], acidez estomacal o indigestión  INSTRUCCIONES SOBRE EL GURWINDER HOSPITALARIA:   Alimentos que debe limitar o evitar: Es posible que usted necesite evitar los alimentos ácidos, picantes o altos en grasa  No todos los alimentos afectan a las personas de la [address]  [employer]  Usted necesitará aprender cuáles alimentos empeoran kristyn síntomas y tendrá que limitar esos alimentos  Los siguientes son algunos alimentos que podrían empeorar maryanne úlcera o los síntomas de la gastritis:  · Bebidas:     ? Juvenal [place] y [place]    ? Chocolate caliente y refrescos de cola    ? Cualquier bebida con cafeína    ? Café regular y descafeinado    ? Té de hierbabuena y menta tressa    ? Té tressa o kavya, regular o descafeinado    ? Isaiah Erwin y toronja    ? Bebidas alcohólicas    · Especias y condimentos:     ? Phoebe Pitch y [address]    ? Polvo de chile    ? Semilla de mostaza y [place]    · Otros alimentos:     ? Alimentos lácteos hechos de René Lapidus    ? Chocolate    ? Quesos picantes o de sabor bessy, byron de jalapeño o gardenia mau    ? Carne con alto contenido de grasa y [place] condimentada, byron el [place], [place], [place], [facility] york y embutidos    ? Chiles picantes    ? Productos derivados del tomate, byron pasta de [place] city, salsa o jugo del mismo    Alimentos que puede incluir: Consuma maryanne variedad de alimentos saludables de todos los grupos alimenticios  Consuma frutas, verduras, granos enteros y productos lácteos descremados o bajos en grasa  Los granos [employer], los cereales, la pasta y el arroz integral  Elija la carne [place], aves de maurer (madison y Aaron), pescado, frijoles, huevos y bladimir secos   Un plan alimenticio saludable tiene un contenido bajo de grasas no saludables, sal y azúcar  Las grasas saludables incluyen el aceite de haney y de canola  Solicite a perez dietista más información acerca de un plan alimenticio saludable  Otras pautas útiles:  · No coma jonathon antes de acostarse  Deje de comer por lo menos 2 horas antes de acostarse  · Coma comidas pequeñas y con frecuencia  Es probable que perez estómago tolere mejor comidas pequeñas y frecuentes en vez de comidas grandes  © Copyright [address] Information is for End User's use only and may not be sold, redistributed or otherwise used for commercial purposes  All illustrations and images included in CareNotes® are the copyrighted property of A D A [employer]  or [address] [place] [address] es sólo para uso en educación  Perez intención no es darle un consejo médico sobre enfermedades o tratamientos  Colsulte con perez Raquel Giraldo farmacéutico antes de seguir cualquier régimen médico para saber si es seguro y efectivo para usted  Enfermedad por reflujo gastroesofágico   LO QUE NECESITA SABER:   La enfermedad por reflujo gastroesofágico es el reflujo que se produce más de [address] a la semana karon varias semanas  Reflujo significa que el ácido y los alimentos en el estómago regresan al esófago  Generalmente causa acidez y otros síntomas  La ERGE puede causar otros problemas de daniel con el tiempo si no es tratada  INSTRUCCIONES SOBRE EL GURWINDER HOSPITALARIA:   Llame al Nicolas Drain de emergencias local (911 en los [place]) si:  · Usted siente dolor bessy en el pecho y dificultad repentina para respirar  Regrese a la ciro de emergencias si:  · Tiene dificultad para respirar después de vomitar  · Tiene dificultad para tragar o dolor al tragar  · Sadia evacuaciones intestinales son de color kavya, con [place], o de apariencia alquitranada  · Perez vómito parece byron café molido o contiene yanely      Llame a perez médico o gastroenterólogo si:  · Usted siente Arlin Payment y no puede eructar o vomitar  · Vomita grandes cantidades, o vomita con frecuencia  · Usted pierde peso sin proponérselo  · Kristyn síntomas empeoran o no mejoran con el tratamiento  · Usted tiene preguntas o inquietudes acerca de perez condición o cuidado  Medicamentos:  · Los medicamentos para disminuir el ácido estomacal  Los medicamentos también podrían usarse para ayudar a que perez esfínter esofágico inferior y perez estómago se contraigan (estrechen) más  · [place] kristyn medicamentos byron se le haya indicado  Consulte con perez médico si usted ken que perez medicamento no le está ayudando o si presenta efectos secundarios  Infórmele si es alérgico a algún medicamento  Mantenga maryanne lista actualizada de los [place], las vitaminas y los productos herbales que marcio  Incluya los siguientes datos de los medicamentos: cantidad, frecuencia y motivo de administración  Traiga con usted la lista o los envases de las píldoras a kristyn citas de seguimiento  Lleve la lista de los medicamentos con usted en amanda de maryanne emergencia  Control de la ERGE:  · No consuma alimentos o bebidas que puedan aumentar la [place]  Estos incluyen chocolate, menta, comidas fritas o grasosas, bebidas que contienen cafeína o bebidas gaseosas  Otros alimentos incluyen comidas picantes, cebollas, tomates y alimentos a base de tomate  No consuma alimentos y bebidas que puedan irritar perez esófago, byron las frutas cítricas, los jugos y las bebidas alcohólicas  · No ingiera comidas abundantes  Cuando usted come [employer] a la vez, perez estómago necesita más ácido para digerirla  Consuma 6 comidas pequeñas al día en vez de 3 comidas grandes y coma lentamente  No consuma alimentos entre 2 y 3 horas antes de WEDGECARRUP  · Eleve la cabecera de perez cama  Coloque bloques de 6 pulgadas debajo de la cabecera de la estructura de perez cama   También podría usar más maryanne almohada para apoyar perez [place] y hombros mientras duerme  · Mantenga un peso saludable  Si usted tiene sobrepeso, la pérdida de peso podría ayudar a aliviar los síntomas de la Vqrsgxcmm-bsèc-Ukbypx  · No fume  Fumar debilita el esfínter esofágico inferior y [place] el riesgo de Fibclmewb-qrèx-Gpsprc  Pida información a perez médico si usted actualmente fuma y necesita ayuda para dejar de fumar  Los cigarrillos electrónicos o el tabaco sin humo igualmente contienen nicotina  Consulte con perez médico antes de [employer]  · No use ropa que [place] alrededor de la cintura  La ropa apretada puede ejercer presión [employer] y causar o empeorar los síntomas de la Qwrrliaqp-rzèi-Iymwsg  Acuda a kristyn consultas de control con perez médico o gastroenterólogo según le indicaron: Anote kristyn preguntas para que se acuerde de hacerlas karon kristyn visitas  © Copyright [employer] Information is for End User's use only and may not be sold, redistributed or otherwise used for commercial purposes  All illustrations and images included in CareNotes® are the copyrighted property of A D A [employer]  or [address] [place] [address] es sólo para uso en educación  Perez intención no es darle un consejo médico sobre enfermedades o tratamientos  Colsulte con perez Yanira Ferrell farmacéutico antes de seguir cualquier régimen médico para saber si es seguro y efectivo para usted

## 2021-06-25 NOTE — PROGRESS NOTES
[facility] Gastroenterology [facility] - Outpatient Follow-up Note  Patriciaann Fleischer 48 y o  female MRN: [id number]  Encounter: [id number]          ASSESSMENT AND PLAN:      1  Gastroesophageal reflux disease, unspecified whether esophagitis present  2  History of Helicobacter pylori infection  Pt reports symptoms of upper abdominal burning, nausea, and reflux for the last year  Worse with spicy, greasy foods, better with Pepcid Complete  She has history of H pylori treated successfully in 2015  She had recent abdominal US which showed mild hepatic steatosis and cholecystectomy  Differentials could be GERD with esophagitis/gastritis, recurrent H Pylori infection, PUD  Will do EGD for further evaluation and start PPI daily 1/2 hour before breakfast  Patient was counseled on anti-reflux diet and lifestyle measures including weight loss which would also help with hepatic steatosis  Further recommendations pending EGD results  -     EGD; Future; Expected date: 06/25/2021  -     pantoprazole (PROTONIX) 40 mg tablet; Take 1 tablet (40 mg total) by mouth daily 1/2 hour before breakfast    3  History of colon polyps  History of tubular adenoma and hyperplastic polyps in 2015, overdue for screening colonoscopy  Will schedule along with EGD with Dr Edu Orellana  Prep and procedure explained  Will use Miralax/Dulcolax prep  -     Colonoscopy; Future; Expected date: 06/25/2021    4  Iron deficiency anemia due to chronic blood loss  Secondary to heavy menses, patient reports dark stool and she is taking iron supplementation  She had 2 episodes of BRBPR with wiping recently, likely hemorrhoidal  Will further evaluate with EGD/Colonoscopy   -Last hgb normal in 2020  -Per patient's daughter pt had recent CBC with Dr Anna Jaime in [place], will try to obtain record  -     Colonoscopy; Future; Expected date: 06/25/2021  -     EGD;  Future; Expected date: 06/25/2021        ______________________________________________________________________    SUBJECTIVE:  This is a 77-year-old female with history of iron deficiency anemia secondary to menorrhagia, arthritis, GERD, and cholecystectomy who presents for follow-up for evaluation of stomach pain and she is due for colonoscopy screening  She's having burning in her upper abdomen with nausea and heartburn  She has been taking pepcid complete PRN which helps, typically takes it daily  Symptoms are worse with spicy/greasy foods  She had abdominal US done 6/11 which showed mild hepatic steatosis and cholecystectomy  Denies vomiting, thinks she's lost 3 pounds the last couple days  Symptoms have been happening for the last year  No NSAIDs  She has iron deficiency anemia due to heavy periods, she is on iron supplementation and reports dark stool, recently had 2 occurrences of BRB with wiping  Hgb 12 7 in June 2020  She reports recent CBC with Dr Vilma zarco in [place]  Pt was last seen by Dr Chad Witt in 2015  EGD was done at that time due to abdominal pain, postprandial bloating, and nausea  Which showed mild-to-moderate degree of gastritis, narrowing of the pyloric channel, normal esophagus and normal duodenum  Biopsy was was suggestive of H pylori gastritis and duodenitis  She was successfully treated with amoxicillin, clarithromycin, PPI therapy, repeat stool negative in Nov 2015  She then had colonoscopy 11/24/15 for evaluation of weight loss,  lower abdominal pain, and change in bowel habit  Which showed normal colonic mucosa, small internal hemorrhoids, and 3 polyps were removed  Cecum and hepatic flexure polyps both tubular adenomas negative for high-grade dysplasia  Rectal polyp was hyperplastic  She was recommended to repeat colonoscopy in 5 years  REVIEW OF SYSTEMS IS OTHERWISE NEGATIVE        Historical Information   Past Medical History:   Diagnosis Date    Anemia     Arthritis     GERD (gastroesophageal reflux disease)     GI disease     Pneumonia      Past Surgical History:   Procedure Laterality Date    CHOLECYSTECTOMY      HYSTERECTOMY      MAMMO (HISTORICAL)  2018    TUBAL LIGATION       Social History   Social History     Substance and Sexual Activity   Alcohol Use No     Social History     Substance and Sexual Activity   Drug Use No     Social History     Tobacco Use   Smoking Status Never Smoker   Smokeless Tobacco Never Used     Family History   Problem Relation Age of Onset    Hyperlipidemia Mother     Hypertension Mother     Kidney disease Mother     No Known Problems Father     Kidney disease Sister        Meds/Allergies       Current Outpatient Medications:     ascorbic acid (VITAMIN C) 500 MG tablet    ciprofloxacin (CIPRO) 500 mg tablet    ferrous sulfate 220 (44 Fe) mg/5 mL solution    metroNIDAZOLE (FLAGYL) 500 mg tablet    cyclobenzaprine (FLEXERIL) 10 mg tablet    fluticasone (FLONASE) 50 mcg/act nasal spray    megestrol (MEGACE) 40 mg tablet    pantoprazole (PROTONIX) 40 mg tablet    Tranexamic Acid 650 MG TABS    triamcinolone (KENALOG) 0 025 % cream    Allergies   Allergen Reactions    Aspirin Palpitations           Objective     Blood pressure 122/84, pulse 77, height 5' 1" (1 549 m), weight 77 6 kg (171 lb)  Body mass index is 32 31 kg/m²  PHYSICAL EXAM:      General Appearance:   Alert, cooperative, no distress   HEENT:   Normocephalic, atraumatic, anicteric  Neck:  Supple, symmetrical, trachea midline   Lungs:   Clear to auscultation bilaterally; no rales, rhonchi or wheezing; respirations unlabored    Heart[de-identified]   Regular rate and rhythm; no murmur     Abdomen:   Soft, epigastric slightly tender, non-distended; normal bowel sounds; no masses, no organomegaly    Genitalia:   Deferred    Rectal:   Deferred    Extremities:  No cyanosis, clubbing or edema    Skin:  No jaundice, rashes, or lesions    Lymph nodes:  No palpable cervical lymphadenopathy        Lab Results:   No visits with results within 1 Day(s) from this visit  Latest known visit with results is:   Admission on 06/29/2020, Discharged on 06/29/2020   Component Date Value    WBC 06/29/2020 11 61*    RBC 06/29/2020 4 55     Hemoglobin 06/29/2020 12 7     Hematocrit 06/29/2020 37 7     MCV 06/29/2020 83     MCH 06/29/2020 27 9     MCHC 06/29/2020 33 7     RDW 06/29/2020 13 7     MPV 06/29/2020 10 2     Platelets 45/96/2237 365     nRBC 06/29/2020 0     Neutrophils Relative 06/29/2020 71     Immat GRANS % 06/29/2020 0     Lymphocytes Relative 06/29/2020 21     Monocytes Relative 06/29/2020 6     Eosinophils Relative 06/29/2020 1     Basophils Relative 06/29/2020 1     Neutrophils Absolute 06/29/2020 8 32*    Immature Grans Absolute 06/29/2020 0 05     Lymphocytes Absolute 06/29/2020 2 43     Monocytes Absolute 06/29/2020 0 64     Eosinophils Absolute 06/29/2020 0 11     Basophils Absolute 06/29/2020 0 06     Sodium 06/29/2020 138     Potassium 06/29/2020 3 7     Chloride 06/29/2020 103     CO2 06/29/2020 26     ANION GAP 06/29/2020 9     BUN 06/29/2020 9     Creatinine 06/29/2020 0 62     Glucose 06/29/2020 89     Calcium 06/29/2020 8 5     eGFR 06/29/2020 106     Protime 06/29/2020 13 3     INR 06/29/2020 0 98     PTT 06/29/2020 35     NT-proBNP 06/29/2020 43     Troponin I 06/29/2020 <0 02     D-Dimer, Quant 06/29/2020 0 28     Ventricular Rate 06/29/2020 91     Atrial Rate 06/29/2020 91     NC Interval 06/29/2020 140     QRSD Interval 06/29/2020 86     QT Interval 06/29/2020 362     QTC Interval 06/29/2020 445     P Axis 06/29/2020 31     QRS [place] 06/29/2020 -29     T Wave Axis 06/29/2020 14     Troponin I 06/29/2020 <0 02          Radiology Results:   US abdomen complete    Result Date: 6/15/2021  Narrative: ABDOMEN ULTRASOUND, COMPLETE INDICATION:   R52: Pain, unspecified   COMPARISON: None TECHNIQUE:   Real-time ultrasound of the abdomen was performed with a curvilinear transducer with both volumetric sweeps and still imaging techniques  FINDINGS: PANCREAS:  Visualized portions of the pancreas are within normal limits  AORTA AND IVC:  Visualized portions are normal for patient age  LIVER: Evaluation mildly limited by bowel gas  Size:  Within normal range  The liver measures 11 9 cm in the midclavicular line  Contour:  Surface contour is smooth  Parenchyma:  Mild increased echogenicity No evidence of suspicious mass  Limited imaging of the main portal vein shows it to be patent and hepatopetal  BILIARY: Surgically removed gallbladder No intrahepatic biliary dilatation  CBD measures 6 mm  No choledocholithiasis  KIDNEY: Evaluation mildly limited  Right kidney measures 12 2 x 4 7 cm  Within normal limits  Left kidney measures 11 9 x 4 5 cm  Partially exophytic 1 2 cm cyst SPLEEN: Measures 9 6 x 2 2 x 8 7 cm  Within normal limits  ASCITES:  None  Impression: Hepatic mild steatosis Cholecystectomy Workstation performed: [id number]     US pelvis complete w transvaginal    Result Date: 6/18/2021  Narrative: PELVIC ULTRASOUND, COMPLETE INDICATION:  48years old  R10 2: Pelvic and perineal pain  LMP 5/15/2021 COMPARISON: 1/3/2020 and priors TECHNIQUE:   Transabdominal pelvic ultrasound was performed in sagittal and transverse planes with a curvilinear transducer  Additional transvaginal imaging was performed to better evaluate the endometrium and ovaries  Imaging included volumetric sweeps as well as traditional still imaging technique  FINDINGS: UTERUS: The uterus is anteverted in position, measuring 10 7 x 8 7 x 6 7 cm  Multiple myomata distort the uterine contour  Example left fibroid measures 5 8 x 5 5 x 6 5 cm  This is similar in size given variation in measuring technique  The cervix shows no suspicious abnormality  ENDOMETRIUM:  Normal caliber of 6 mm  Homogeneous and normal in appearance   OVARIES/ADNEXA: Right ovary:  2 3 x 1 4 x 1 8 cm  No suspicious right ovarian abnormality  Doppler flow within normal limits  Left ovary:  3 9 x 1 5 x 3 cm  No suspicious left ovarian abnormality  Doppler flow within normal limits  No suspicious adnexal mass or loculated collections  There is no free fluid  Impression: 1  Minimal change in multiple large uterine fibroids    Workstation performed: [id number]

## 2021-06-29 ENCOUNTER — TELEPHONE (OUTPATIENT)
Dept: GASTROENTEROLOGY | Facility: CLINIC | Age: 51
End: 2021-06-29

## 2021-07-01 NOTE — TELEPHONE ENCOUNTER
THANK YOU  I TRIED CALLING THAT OFFICE AGAIN AND IT RINGS, THEN SOME MUSIC, RINGS AGAIN AND THEN NOTHING  I HAVE NO OTHER NUMBER TO CALL  I WILL KEEP TRYING AND TRY TO LOCATE ANOTHER NUMBER

## 2021-07-26 ENCOUNTER — TELEPHONE (OUTPATIENT)
Dept: GASTROENTEROLOGY | Facility: CLINIC | Age: 51
End: 2021-07-26

## 2021-07-26 NOTE — TELEPHONE ENCOUNTER
Pt is scheduled for a procedure tomorrow and needs work excuse for today and tomorrow since she works today from 4pm-2am   Please call her daughter back at [phone]  Thank you so much!

## 2021-07-26 NOTE — TELEPHONE ENCOUNTER
Wrote note for patient in letters section   [facility]

## 2021-07-26 NOTE — TELEPHONE ENCOUNTER
Called and spoke to daughter whom informed her mom will get it tomorrow at procedure  I called and spoke to Jo Walter at [facility] whom printed out and put on pt's chart for tomorrow

## 2021-07-27 ENCOUNTER — ANESTHESIA (OUTPATIENT)
Dept: GASTROENTEROLOGY | Facility: AMBULATORY SURGERY CENTER | Age: 51
End: 2021-07-27

## 2021-07-27 ENCOUNTER — ANESTHESIA EVENT (OUTPATIENT)
Dept: GASTROENTEROLOGY | Facility: AMBULATORY SURGERY CENTER | Age: 51
End: 2021-07-27

## 2021-07-27 ENCOUNTER — TELEPHONE (OUTPATIENT)
Dept: GASTROENTEROLOGY | Facility: CLINIC | Age: 51
End: 2021-07-27

## 2021-07-27 ENCOUNTER — HOSPITAL ENCOUNTER (OUTPATIENT)
Dept: GASTROENTEROLOGY | Facility: AMBULATORY SURGERY CENTER | Age: 51
Discharge: HOME | End: 2021-07-27
Payer: COMMERCIAL

## 2021-07-27 VITALS
BODY MASS INDEX: 33.77 KG/M2 | WEIGHT: 172 LBS | OXYGEN SATURATION: 100 % | DIASTOLIC BLOOD PRESSURE: 67 MMHG | RESPIRATION RATE: 18 BRPM | HEART RATE: 63 BPM | SYSTOLIC BLOOD PRESSURE: 102 MMHG | HEIGHT: 60 IN | TEMPERATURE: 97.2 F

## 2021-07-27 DIAGNOSIS — Z86.010 HISTORY OF COLON POLYPS: ICD-10-CM

## 2021-07-27 DIAGNOSIS — Z86.19 HISTORY OF HELICOBACTER PYLORI INFECTION: ICD-10-CM

## 2021-07-27 DIAGNOSIS — K21.9 GASTROESOPHAGEAL REFLUX DISEASE, UNSPECIFIED WHETHER ESOPHAGITIS PRESENT: ICD-10-CM

## 2021-07-27 DIAGNOSIS — D50.0 IRON DEFICIENCY ANEMIA DUE TO CHRONIC BLOOD LOSS: ICD-10-CM

## 2021-07-27 LAB
EXT PREGNANCY TEST URINE: NEGATIVE
EXT. CONTROL: NORMAL

## 2021-07-27 PROCEDURE — 00813 ANES UPR LWR GI NDSC PX: CPT | Performed by: NURSE ANESTHETIST, CERTIFIED REGISTERED

## 2021-07-27 PROCEDURE — 88305 TISSUE EXAM BY PATHOLOGIST: CPT | Performed by: PATHOLOGY

## 2021-07-27 PROCEDURE — 43239 EGD BIOPSY SINGLE/MULTIPLE: CPT | Performed by: INTERNAL MEDICINE

## 2021-07-27 PROCEDURE — 88313 SPECIAL STAINS GROUP 2: CPT | Performed by: PATHOLOGY

## 2021-07-27 PROCEDURE — 88341 IMHCHEM/IMCYTCHM EA ADD ANTB: CPT | Performed by: PATHOLOGY

## 2021-07-27 PROCEDURE — 88342 IMHCHEM/IMCYTCHM 1ST ANTB: CPT | Performed by: PATHOLOGY

## 2021-07-27 PROCEDURE — 45380 COLONOSCOPY AND BIOPSY: CPT | Performed by: INTERNAL MEDICINE

## 2021-07-27 RX ORDER — SODIUM CHLORIDE 9 MG/ML
20 INJECTION, SOLUTION INTRAVENOUS CONTINUOUS
Status: DISCONTINUED | OUTPATIENT
Start: 2021-07-27 | End: 2021-07-31 | Stop reason: HOSPADM

## 2021-07-27 RX ORDER — PANTOPRAZOLE SODIUM 40 MG/1
40 TABLET, DELAYED RELEASE ORAL DAILY
Qty: 90 TABLET | Refills: 1 | Status: SHIPPED | OUTPATIENT
Start: 2021-07-27 | End: 2022-06-17 | Stop reason: SDUPTHER

## 2021-07-27 RX ORDER — LIDOCAINE HYDROCHLORIDE 20 MG/ML
INJECTION, SOLUTION EPIDURAL; INFILTRATION; INTRACAUDAL; PERINEURAL AS NEEDED
Status: DISCONTINUED | OUTPATIENT
Start: 2021-07-27 | End: 2021-07-27

## 2021-07-27 RX ORDER — SODIUM CHLORIDE 9 MG/ML
30 INJECTION, SOLUTION INTRAVENOUS CONTINUOUS
Status: DISCONTINUED | OUTPATIENT
Start: 2021-07-27 | End: 2021-07-31 | Stop reason: HOSPADM

## 2021-07-27 RX ORDER — PROPOFOL 10 MG/ML
INJECTION, EMULSION INTRAVENOUS AS NEEDED
Status: DISCONTINUED | OUTPATIENT
Start: 2021-07-27 | End: 2021-07-27

## 2021-07-27 RX ADMIN — LIDOCAINE HYDROCHLORIDE 30 MG: 20 INJECTION, SOLUTION EPIDURAL; INFILTRATION; INTRACAUDAL; PERINEURAL at 09:48

## 2021-07-27 RX ADMIN — LIDOCAINE HYDROCHLORIDE 70 MG: 20 INJECTION, SOLUTION EPIDURAL; INFILTRATION; INTRACAUDAL; PERINEURAL at 09:45

## 2021-07-27 RX ADMIN — PROPOFOL 50 MG: 10 INJECTION, EMULSION INTRAVENOUS at 10:03

## 2021-07-27 RX ADMIN — PROPOFOL 50 MG: 10 INJECTION, EMULSION INTRAVENOUS at 09:54

## 2021-07-27 RX ADMIN — PROPOFOL 50 MG: 10 INJECTION, EMULSION INTRAVENOUS at 09:50

## 2021-07-27 RX ADMIN — PROPOFOL 50 MG: 10 INJECTION, EMULSION INTRAVENOUS at 09:47

## 2021-07-27 RX ADMIN — PROPOFOL 50 MG: 10 INJECTION, EMULSION INTRAVENOUS at 09:58

## 2021-07-27 RX ADMIN — PROPOFOL 150 MG: 10 INJECTION, EMULSION INTRAVENOUS at 09:45

## 2021-07-27 RX ADMIN — SODIUM CHLORIDE: 9 INJECTION, SOLUTION INTRAVENOUS at 09:41

## 2021-07-27 NOTE — ANESTHESIA POSTPROCEDURE EVALUATION
Post-Op Assessment Note    CV Status:  Stable  Pain Score: 0    Pain management: adequate     Mental Status:  Alert and awake   Hydration Status:  Stable   PONV Controlled:  None   Airway Patency:  Patent      Post Op Vitals Reviewed: Yes      Staff: CRNA         No complications documented      BP   109/75   Temp     Pulse  70   Resp   18   SpO2   99

## 2021-07-27 NOTE — TELEPHONE ENCOUNTER
Will call pt's daughter tomorrow since pt had procedure today  Have message to Dr Deb Parson asking if fna or cytology need to be done

## 2021-07-27 NOTE — DISCHARGE INSTRUCTIONS
Gastric Polyps   WHAT YOU NEED TO KNOW:   What are gastric polyps? Gastric polyps are growths that form in the lining of your stomach  They are not cancerous, but certain types of polyps can change into cancer  What puts me at risk for gastric polyps? · Chronic gastritis caused by NSAIDs use or ulcers    · Long-term use of proton pump inhibitor medicines (used to decrease stomach acid)    · An infection in your stomach caused by H  pylori bacteria    What are the symptoms of gastric polyps? You may have no symptoms  Large polyps may cause any of the following:  · Abdominal pain    · Indigestion    · Vomiting after meals or vomiting blood    · Dark or bloody bowel movements    How are gastric polyps diagnosed? Gastric polyps are usually found during an endoscopy for another reason  All or part of the polyp will be removed during the test  Your healthcare provider may also remove tissue from your stomach  The polyps and tissue are sent to the lab for testing  How are gastric polyps treated? Some types of polyps go away on their own  Other types may be removed if they are large, you have symptoms, or abnormal cells are found  Large polyps and abnormal cells increase your risk for cancer  You may also need antibiotics if you have an infection caused by H  pylori bacteria  Part of your stomach may be removed if the polyps cannot be removed and abnormal cells are found  When should I seek immediate care? · You have blood in your vomit  · You have dark or bloody bowel movements  · You have severe pain in your abdomen that does not go away after you take medicine  When should I contact my healthcare provider? · You have indigestion that does not go away with treatment  · You vomit after meals  · You have questions or concerns about your condition or care  CARE AGREEMENT:   You have the right to help plan your care  Learn about your health condition and how it may be treated   Discuss treatment options with your healthcare providers to decide what care you want to receive  You always have the right to refuse treatment  The above information is an  only  It is not intended as medical advice for individual conditions or treatments  Talk to your doctor, nurse or pharmacist before following any medical regimen to see if it is safe and effective for you  © Copyright [employer] 2021 Information is for End User's use only and may not be sold, redistributed or otherwise used for commercial purposes  All illustrations and images included in CareNotes® are the copyrighted property of [employer]  or [address] [place], [place] [address]   Upper Endoscopy and Colonoscopy   WHAT YOU NEED TO KNOW:   An upper endoscopy is also called an upper gastrointestinal (GI) endoscopy, or an esophagogastroduodenoscopy (EGD)  It is a procedure to examine the inside of your esophagus, stomach, and duodenum (first part of the small intestine) with a scope  You may feel bloated, gassy, or have some abdominal discomfort after your procedure  Your throat may be sore for 24 to 36 hours  You may burp or pass gas from air that is still inside your body  A colonoscopy is a procedure to examine the inside of your colon (intestine) with a scope  Polyps or tissue growths may have been removed during your colonoscopy  It is normal to feel bloated and to have some abdominal discomfort  You should be passing gas  If you have hemorrhoids or you had polyps removed, you may have a small amount of bleeding  DISCHARGE INSTRUCTIONS:   Seek care immediately if:   · You have sudden, severe abdominal pain  · You have problems swallowing  · You have a large amount of black, sticky bowel movements or blood in your bowel movements  · You have sudden trouble breathing  · You feel weak, lightheaded, or faint or your heart beats faster than normal for you       Contact your healthcare provider if:   · You have a fever and chills  · You have nausea or are vomiting  · Your abdomen is bloated or feels full and hard  · You have abdominal pain  · You have a large amount of black, sticky bowel movements or blood in your bowel movements  · You have not had a bowel movement for 3 days after your procedure  · You have rash or hives  · You have questions or concerns about your procedure  Activity:   ·       Do not lift, strain, or run for 24 hours after your procedure  ·       Rest after your procedure  You have been given medicine to relax you  Do not drive or make important decisions until the day after your procedure  Return to your normal activity as directed  ·       Relieve gas and discomfort from bloating by lying on your right side with a heating pad on your abdomen  You may need to take short walks to help the gas move out  Eat small meals until bloating is relieved  Follow up with your healthcare provider as directed: Write down your questions so you remember to ask them during your visits  If you take a blood thinner, please review the specific instructions from your endoscopist about when you should resume it  These can be found in the Recommendation and Your Medication list sections of this After Visit Summary  High Fiber Diet   WHAT YOU NEED TO KNOW:   What is a high-fiber diet? A high-fiber diet includes foods that have a high amount of fiber  Fiber is the part of fruits, vegetables, and grains that is not broken down by your body  Fiber keeps your bowel movements regular  Fiber can also help lower your cholesterol level, control blood sugar in people with diabetes, and relieve constipation  Fiber can also help you control your weight because it helps you feel full faster  Most adults should eat 25 to 35 grams of fiber each day  Talk to your dietitian or healthcare provider about the amount of fiber you need  What foods are good sources of fiber?        · Foods with at least 4 grams of fiber per serving:      ? ? to ½ cup of high-fiber cereal (check the nutrition label on the box)    ? ½ cup of blackberries or raspberries    ? 4 dried prunes    ? 1 cooked artichoke    ? ½ cup of cooked legumes, such as lentils, or red, kidney, and johnston beans    · Foods with 1 to 3 grams of fiber per serving:      ? 1 slice of whole-wheat, pumpernickel, or rye bread    ? ½ cup of cooked brown rice    ? 4 whole-wheat crackers    ? 1 cup of oatmeal    ? ½ cup of cereal with 1 to 3 grams of fiber per serving (check the nutrition label on the box)    ? 1 small piece of fruit, such as an apple, banana, pear, kiwi, or orange    ? 3 dates    ? ½ cup of canned apricots, fruit cocktail, peaches, or pears    ? ½ cup of raw or cooked vegetables, such as carrots, cauliflower, cabbage, spinach, squash, or corn  What are some ways that I can increase fiber in my diet? · Choose brown or wild rice instead of white rice  · Use whole wheat flour in recipes instead of white or all-purpose flour  · Add beans and peas to casseroles or soups  · Choose fresh fruit and vegetables with peels or skins on instead of juices  What other guidelines should I follow? · Add fiber to your diet slowly  You may have abdominal discomfort, bloating, and gas if you add fiber to your diet too quickly  · Drink plenty of liquids as you add fiber to your diet  You may have nausea or develop constipation if you do not drink enough water  Ask how much liquid to drink each day and which liquids are best for you  CARE AGREEMENT:   You have the right to help plan your care  Discuss treatment options with your healthcare provider to decide what care you want to receive  You always have the right to refuse treatment  The above information is an  only  It is not intended as medical advice for individual conditions or treatments   Talk to your doctor, nurse or pharmacist before following any medical regimen to see if it is safe and effective for you  © Copyright [employer] 2021 Information is for End User's use only and may not be sold, redistributed or otherwise used for commercial purposes  All illustrations and images included in CareNotes® are the copyrighted property of [employer] M [employer]  or Molly Hong  Hemorrhoids   WHAT YOU NEED TO KNOW:   What are hemorrhoids? Hemorrhoids are swollen blood vessels inside your rectum (internal hemorrhoids) or on your anus (external hemorrhoids)  Sometimes a hemorrhoid may prolapse  This means it extends out of your anus  What increases my risk for hemorrhoids? · Pregnancy or obesity    · Straining or sitting for a long time during bowel movements    · Liver disease    · Weak muscles around the anus caused by older age, rectal surgery, or anal intercourse    · A lack of physical activity    · Chronic diarrhea or constipation    · A low-fiber diet    What are the signs and symptoms of hemorrhoids? · Pain or itching around your anus or inside your rectum    · Swelling or bumps around your anus    · Bright red blood in your bowel movement, on the toilet paper, or in the toilet bowl    · Tissue bulging out of your anus (prolapsed hemorrhoids)    · Incontinence (poor control over urine or bowel movements)    How are hemorrhoids diagnosed? Your healthcare provider will ask about your symptoms, the foods you eat, and your bowel movements  He or she will examine your anus for external hemorrhoids  You may need the following:  · A digital rectal exam  is a test to check for hemorrhoids  Your healthcare provider will put a gloved finger inside your anus to feel for the hemorrhoids  · An anoscopy  is a test that uses a scope (small tube with a light and camera on the end) to look at your hemorrhoids  How are hemorrhoids treated? Treatment will depend on your symptoms   You may need any of the following:  · Medicines  can help decrease pain and swelling, and soften your bowel movement  The medicine may be a pill, pad, cream, or ointment  · Procedures  may be used to shrink or remove your hemorrhoid  Examples include rubber-band ligation, sclerotherapy, and photocoagulation  These procedures may be done in your healthcare provider's office  Ask your healthcare provider for more information about these procedures  · Surgery  may be needed to shrink or remove your hemorrhoids  How can I manage my symptoms? · Apply ice on your anus for 15 to 20 minutes every hour or as directed  Use an ice pack, or put crushed ice in a plastic bag  Cover it with a towel before you apply it to your anus  Ice helps prevent tissue damage and decreases swelling and pain  · Take a sitz bath  Fill a bathtub with 4 to 6 inches of warm water  You may also use a sitz bath pan that fits inside a toilet bowl  Sit in the sitz bath for 15 minutes  Do this 3 times a day, and after each bowel movement  The warm water can help decrease pain and swelling  · Keep your anal area clean  Gently wash the area with warm water daily  Soap may irritate the area  After a bowel movement, wipe with moist towelettes or wet toilet paper  Dry toilet paper can irritate the area  How can I help prevent hemorrhoids? · Do not strain to have a bowel movement  Do not sit on the toilet too long  These actions can increase pressure on the tissues in your rectum and anus  · Drink plenty of liquids  Liquids can help prevent constipation  Ask how much liquid to drink each day and which liquids are best for you  · Eat a variety of high-fiber foods  Examples include fruits, vegetables, and whole grains  Ask your healthcare provider how much fiber you need each day  You may need to take a fiber supplement  · Exercise as directed  Exercise, such as walking, may make it easier to have a bowel movement  Ask your healthcare provider to help you create an exercise plan       · Do not have anal sex   Anal sex can weaken the skin around your rectum and anus  · Avoid heavy lifting  This can cause straining and increase your risk for another hemorrhoid  When should I seek immediate care? · You have severe pain in your rectum or around your anus  · You have severe pain in your abdomen and you are vomiting  · You have bleeding from your anus that soaks through your underwear  When should I contact my healthcare provider? · You have frequent and painful bowel movements  · Your hemorrhoid looks or feels more swollen than usual      · You do not have a bowel movement for 2 days or more  · You see or feel tissue coming through your anus  · You have questions or concerns about your condition or care  CARE AGREEMENT:   You have the right to help plan your care  Learn about your health condition and how it may be treated  Discuss treatment options with your healthcare providers to decide what care you want to receive  You always have the right to refuse treatment  The above information is an  only  It is not intended as medical advice for individual conditions or treatments  Talk to your doctor, nurse or pharmacist before following any medical regimen to see if it is safe and effective for you  © Copyright [employer] 2021 Information is for End User's use only and may not be sold, redistributed or otherwise used for commercial purposes  All illustrations and images included in CareNotes® are the copyrighted property of [employer] M [employer] Inc  or [facility] Gloria Wolfe   Colorectal Polyps   WHAT YOU NEED TO KNOW:   What are colorectal polyps? Colorectal polyps are small growths of tissue in the lining of the colon and rectum  Most polyps are usually benign (not cancer)  Certain types of polyps, called adenomatous polyps, may turn into cancer  What increases my risk for colorectal polyps? The exact cause of colorectal polyps is unknown   The following may increase your risk:  · Older age    · Foods high in fat and low in fiber    · Family history of polyps    · Intestinal diseases, such as Crohn disease or ulcerative colitis    · Smoking cigarettes or drinking alcohol    · Lack of physical activity, such as exercise    · Obesity    What are the signs and symptoms of colorectal polyps? · Blood in your bowel movement or bleeding from the rectum    · Change in bowel movement habits, such as diarrhea or constipation    · Abdominal pain    How are colorectal polyps diagnosed? You should have fecal blood screening 1 time each year for colorectal disease if you are older than 50 years  You should be screened earlier if you have an intestinal disease or a family history of polyps or colorectal cancer  During this screening, a sample of your bowel movement is checked for blood, which may be an early sign of colorectal polyps or cancer  You may also need any of the following tests:  · A digital rectal exam  means your healthcare provider will use a finger to check for polyps  · A barium enema  is an x-ray of the colon  A tube is put into your anus, and a liquid called barium is put through the tube  Barium is used so that healthcare providers can see your colon better on the x-ray film  · A virtual colonoscopy  is a CT scan that takes pictures of the inside of your colon and rectum  A small, flexible tube is put into your rectum and air or carbon dioxide (gas) is used to expand your colon  This lets healthcare providers clearly see your colon and any polyps on a monitor  · Colonoscopy or sigmoidoscopy  are procedures to help your healthcare provider see the inside of your colon  He or she will use a flexible tube with a small light and camera on the end  During a sigmoidoscopy, your healthcare provider will only look at rectum and lower colon  During a colonoscopy, he or she will look at the full length of your colon   A small amount of tissue may be removed from your colon for tests        How are colorectal polyps treated? Small, benign polyps may not need treatment  Your healthcare provider will check the polyp over time to make sure it is not changing  Polyps that are cancer may be removed with one of the following:  · A polypectomy  is a minimally invasive procedure to remove a polyp during a colonoscopy or sigmoidoscopy  Your healthcare provider may need to remove the polyp with a laparoscope  Laparoscopy is done by inserting a small, flexible scope into incisions made on your abdomen  · Surgery  may be needed to remove large or deep polyps that cannot be removed in a polypectomy  Tissues or lymph nodes near a polyp may also be removed  This helps stop cancer from spreading  What can I do to lower my risk for colorectal polyps? · Eat a variety of healthy foods  Healthy foods include fruit, vegetables, whole-grain breads, low-fat dairy products, beans, lean meat, and fish  Ask if you need to be on a special diet  · Maintain a healthy weight  Ask your healthcare provider what a healthy weight is for you  Ask him or her to help with a weight loss plan if you are overweight  · Exercise as directed  Begin to exercise slowly and do more as you get stronger  Talk with your healthcare provider before you start an exercise program          · Limit alcohol  Your risk for polyps increases the more you drink  · Do not smoke  Nicotine and other chemicals in cigarettes and cigars increase your risk for polyps  Ask your healthcare provider for information if you currently smoke and need help to quit  E-cigarettes or smokeless tobacco still contain nicotine  Talk to your healthcare provider before you use these products  Where can I find more information? · [address] ([facility])  [address] [place], [place] [address]  Phone: [phone]  Web Address: Isma Rivera  [facility] gov    Call your local emergency number (911 in the [address] [facility]) if:   · You have sudden shortness of breath  · You have a fast heart rate, fast breathing, or are too dizzy to stand up  When should I seek immediate care? · You have severe abdominal pain  · You see blood in your bowel movement  When should I call my doctor? · You have a fever  · You have chills, a cough, or feel weak and achy  · You have abdominal pain that does not go away or gets worse after you take medicine  · Your abdomen is swollen  · You are losing weight without trying  · You have questions or concerns about your condition or care  CARE AGREEMENT:   You have the right to help plan your care  Learn about your health condition and how it may be treated  Discuss treatment options with your healthcare providers to decide what care you want to receive  You always have the right to refuse treatment  The above information is an  only  It is not intended as medical advice for individual conditions or treatments  Talk to your doctor, nurse or pharmacist before following any medical regimen to see if it is safe and effective for you  © Copyright [employer] 2021 Information is for End User's use only and may not be sold, redistributed or otherwise used for commercial purposes  All illustrations and images included in CareNotes® are the copyrighted property of [employer] M [employer] Inc  or [address] [place] [address]  GERD (Gastroesophageal Reflux Disease)   WHAT YOU NEED TO KNOW:   Gastroesophageal reflux disease (GERD) is reflux that occurs more than twice a week for a few weeks  Reflux means acid and food in the stomach back up into the esophagus  It usually causes heartburn and other symptoms  GERD can cause other health problems over time if it is not treated  DISCHARGE INSTRUCTIONS:   Call your local emergency number (911 in the [address] [facility]) if:   · You have severe chest pain and sudden trouble breathing        Return to the emergency department if:   · You have trouble breathing after you vomit  · You have trouble swallowing, or pain with swallowing  · Your bowel movements are black, bloody, or tarry-looking  · Your vomit looks like coffee grounds or has blood in it  Call your doctor or gastroenterologist if:   · You feel full and cannot burp or vomit  · You vomit large amounts, or you vomit often  · You are losing weight without trying  · Your symptoms get worse or do not improve with treatment  · You have questions or concerns about your condition or care  Medicines:   · Medicines  are used to decrease stomach acid  Medicine may also be used to help your lower esophageal sphincter and stomach contract (tighten) more  · Take your medicine as directed  Contact your healthcare provider if you think your medicine is not helping or if you have side effects  Tell him of her if you are allergic to any medicine  Keep a list of the medicines, vitamins, and herbs you take  Include the amounts, and when and why you take them  Bring the list or the pill bottles to follow-up visits  Carry your medicine list with you in case of an emergency  Manage GERD:       · Do not have foods or drinks that may increase heartburn  These include chocolate, peppermint, fried or fatty foods, drinks that contain caffeine, or carbonated drinks (soda)  Other foods include spicy foods, onions, tomatoes, and tomato-based foods  Do not have foods or drinks that can irritate your esophagus, such as citrus fruits, juices, and alcohol  · Do not eat large meals  When you eat a lot of food at one time, your stomach needs more acid to digest it  Eat 6 small meals each day instead of 3 large ones, and eat slowly  Do not eat meals 2 to 3 hours before bedtime  · Elevate the head of your bed  Place 6-inch blocks under the head of your bed frame  You may also use more than one pillow under your head and shoulders while you sleep  · Maintain a healthy weight    If you are overweight, weight loss may help relieve symptoms of GERD  · Do not smoke  Smoking weakens the lower esophageal sphincter and increases the risk of GERD  Ask your healthcare provider for information if you currently smoke and need help to quit  E-cigarettes or smokeless tobacco still contain nicotine  Talk to your healthcare provider before you use these products  · Do not wear clothing that is tight around your waist   Tight clothing can put pressure on your stomach and cause or worsen GERD symptoms  Follow up with your doctor or gastroenterologist as directed:  Write down your questions so you remember to ask them during your visits  © Copyright [employer] 2021 Information is for End User's use only and may not be sold, redistributed or otherwise used for commercial purposes  All illustrations and images included in CareNotes® are the copyrighted property of [employer]  or Molly Wolfe   The above information is an  only  It is not intended as medical advice for individual conditions or treatments  Talk to your doctor, nurse or pharmacist before following any medical regimen to see if it is safe and effective for you  Gastritis   WHAT YOU NEED TO KNOW:   What is gastritis? Gastritis is inflammation or irritation of the lining of your stomach  What increases my risk for gastritis? · Infection with bacteria, a virus, or a parasite    · NSAIDs, aspirin, or steroid medicine    · Use of tobacco products or alcohol    · Trauma such as an injury to your stomach or intestine    · Autoimmune disorders such as diabetes, thyroid disease, or Crohn disease    · Stress    · Age older than 60 years    · Illegal drugs, such as cocaine    What are the signs and symptoms of gastritis?    · Stomach pain, burning, or tenderness when you press on it    · Stomach fullness or tightness    · Nausea or vomiting    · Loss of appetite, or feeling full quickly when you eat    · Bad breath    · Fatigue or feeling more tired than usual    · Heartburn    How is gastritis diagnosed? Your healthcare provider will ask about your signs and symptoms and examine you  You may need any of the following:  · Blood tests  may be used to show an infection, dehydration, or anemia (low red blood cell levels)  · A bowel movement sample  may be tested for blood or the germ that may be causing your gastritis  · A breath test  may show if H pylori is causing your gastritis  You will be given a liquid to drink  Then you will breathe into a bag  Your healthcare provider will measure the amount of carbon dioxide in your breath  Extra amounts of carbon dioxide may mean you have an H pylori infection  · An endoscopy  may be used to look for irritation or bleeding in your stomach  Your healthcare provider will use an endoscope (tube with a light and camera on the end) during the procedure  He or she may take a sample from your stomach to be tested  How is gastritis treated? Your symptoms may go away without treatment  Treatment will depend on what is causing your gastritis  Your healthcare provider may recommend changes to the medicines you take  Medicines may be given to help treat a bacterial infection or decrease stomach acid  How can I manage or prevent gastritis? · Do not smoke  Nicotine and other chemicals in cigarettes and cigars can make your symptoms worse and cause lung damage  Ask your healthcare provider for information if you currently smoke and need help to quit  E-cigarettes or smokeless tobacco still contain nicotine  Talk to your healthcare provider before you use these products  · Do not drink alcohol  Alcohol can prevent healing and make your gastritis worse  Talk to your healthcare provider if you need help to stop drinking  · Do not take NSAIDs or aspirin unless directed  These and similar medicines can cause irritation of your stomach lining   If your healthcare provider says it is okay to take NSAIDs, take them with food  · Do not eat foods that cause irritation  Foods such as oranges and salsa can cause burning or pain  Eat a variety of healthy foods  Examples include fruits (not citrus), vegetables, low-fat dairy products, beans, whole-grain breads, and lean meats and fish  Try to eat small meals, and drink water with your meals  Do not eat for at least 3 hours before you go to bed  · Find ways to relax and decrease stress  Stress can increase stomach acid and make gastritis worse  Activities such as yoga, meditation, or listening to music can help you relax  Spend time with friends, or do things you enjoy  Call 911 for any of the following:   · You develop chest pain or shortness of breath  When should I seek immediate care? · You vomit blood  · You have black or bloody bowel movements  · You have severe stomach or back pain  When should I contact my healthcare provider? · You have a fever  · You have new or worsening symptoms, even after treatment  · You have questions or concerns about your condition or care  CARE AGREEMENT:   You have the right to help plan your care  Learn about your health condition and how it may be treated  Discuss treatment options with your healthcare providers to decide what care you want to receive  You always have the right to refuse treatment  The above information is an  only  It is not intended as medical advice for individual conditions or treatments  Talk to your doctor, nurse or pharmacist before following any medical regimen to see if it is safe and effective for you  © Copyright [employer] 2021 Information is for End User's use only and may not be sold, redistributed or otherwise used for commercial purposes   All illustrations and images included in CareNotes® are the copyrighted property of A D A M , Inc  or [address] [place] [address] [employer]

## 2021-07-27 NOTE — H&P
History and Physical - [facility]  Rowena Coloradopb Santiago 48 y o  female MRN: [id number]                  HPI: Maryclare Claude is a 48y o  year old female who presents for EGD and colonoscopy for iron deficiency anemia, history of GERD, history of Helicobacter pylori infection    REVIEW OF SYSTEMS: Per the HPI, and otherwise unremarkable  Historical Information   Past Medical History:   Diagnosis Date    Abdominal pain     Anemia     Arthritis     Colon polyp     GERD (gastroesophageal reflux disease)     GI disease     History of Helicobacter pylori infection     Iron deficiency anemia     Pneumonia      Past Surgical History:   Procedure Laterality Date    CHOLECYSTECTOMY      COLONOSCOPY      MAMMO (HISTORICAL)  2018    TUBAL LIGATION      UPPER GASTROINTESTINAL ENDOSCOPY       Social History   Social History     Substance and Sexual Activity   Alcohol Use No     Social History     Substance and Sexual Activity   Drug Use No     Social History     Tobacco Use   Smoking Status Never Smoker   Smokeless Tobacco Never Used     Family History   Problem Relation Age of Onset    Hyperlipidemia Mother     Hypertension Mother     Kidney disease Mother     No Known Problems Father     Kidney disease Sister        Meds/Allergies     (Not in a hospital admission)      Allergies   Allergen Reactions    Aspirin Palpitations       Objective     /84   Pulse 80   Temp (!) 97 2 °F (36 2 °C) (Temporal)   Resp 18   Ht 5' (1 524 m)   Wt 78 kg (172 lb)   LMP 07/05/2021   SpO2 99%   BMI 33 59 kg/m²       PHYSICAL EXAM    Gen: NAD  CV: RRR  CHEST: Clear  ABD: soft, NT/ND  EXT: no edema      ASSESSMENT/PLAN:  This is a 48y o  year old female here for EGD and colonoscopy, and she is stable and optimized for her procedure

## 2021-07-27 NOTE — TELEPHONE ENCOUNTER
----- Message from Aneudy Zavala RN sent at 7/27/2021 10:54 AM EDT -----  Regarding: Schedule EUS  Per Dr Reinaldo Alvarado, please schedule EUS for this pt to evaluate stomach nodule  Please call her daughter Jaren Shaikh at [phone] with info  Pt speaks only [place]  Thank you

## 2021-08-11 ENCOUNTER — RA CDI HCC (OUTPATIENT)
Dept: OTHER | Facility: HOSPITAL | Age: 51
End: 2021-08-11

## 2021-08-12 NOTE — PROGRESS NOTES
[facility]       Chart reviewed, no opportunity found: CHART REVIEWED, NO OPPORTUNITY FOUND                        Patients insurance company:  [employer] [facility] (Medicare Advantage and Commercial)

## 2021-08-13 ENCOUNTER — TELEPHONE (OUTPATIENT)
Dept: GASTROENTEROLOGY | Facility: HOSPITAL | Age: 51
End: 2021-08-13

## 2021-08-15 ENCOUNTER — ANESTHESIA (OUTPATIENT)
Dept: ANESTHESIOLOGY | Facility: HOSPITAL | Age: 51
End: 2021-08-15

## 2021-08-15 ENCOUNTER — ANESTHESIA EVENT (OUTPATIENT)
Dept: ANESTHESIOLOGY | Facility: HOSPITAL | Age: 51
End: 2021-08-15

## 2021-08-15 NOTE — ANESTHESIA PREPROCEDURE EVALUATION
Procedure:  PRE-OP ONLY    Stress test ordered 2020 - not completed    No recent Echos  Previous in 2014 WNL    Seemingly unremarkable anesthesia July 2021 for EGD/Colon    Relevant Problems   CARDIO   (+) Precordial pain      GI/HEPATIC   (+) GERD (gastroesophageal reflux disease)      HEMATOLOGY   (+) Anemia   (+) Iron deficiency anemia due to chronic blood loss      NEURO/PSYCH   (+) Tension-type headache, not intractable             Anesthesia Plan  ASA Score- 2     Anesthesia Type- IV sedation with anesthesia with ASA Monitors  Additional Monitors:   Airway Plan:     Comment: Discussed risks/benefits, including medication reactions, awareness, aspiration, and serious/life threatening complications  Plan to maintain native airway with IVGA, monitored with EtCO2  Plan Factors-Exercise tolerance (METS): >4 METS  Patient summary reviewed  Patient instructed to abstain from smoking on day of procedure  Patient did not smoke on day of surgery  Induction- intravenous  Postoperative Plan-     Informed Consent- Anesthetic plan and risks discussed with patient  I personally reviewed this patient with the CRNA  Discussed and agreed on the Anesthesia Plan with the CRNA  Bambi Pandey

## 2021-08-16 ENCOUNTER — ANESTHESIA (OUTPATIENT)
Dept: GASTROENTEROLOGY | Facility: HOSPITAL | Age: 51
End: 2021-08-16

## 2021-08-16 ENCOUNTER — HOSPITAL ENCOUNTER (OUTPATIENT)
Dept: GASTROENTEROLOGY | Facility: HOSPITAL | Age: 51
Setting detail: OUTPATIENT SURGERY
Discharge: HOME | End: 2021-08-16
Attending: INTERNAL MEDICINE | Admitting: INTERNAL MEDICINE
Payer: COMMERCIAL

## 2021-08-16 ENCOUNTER — ANESTHESIA EVENT (OUTPATIENT)
Dept: GASTROENTEROLOGY | Facility: HOSPITAL | Age: 51
End: 2021-08-16

## 2021-08-16 VITALS
TEMPERATURE: 96.5 F | BODY MASS INDEX: 33.57 KG/M2 | HEIGHT: 60 IN | RESPIRATION RATE: 16 BRPM | SYSTOLIC BLOOD PRESSURE: 131 MMHG | DIASTOLIC BLOOD PRESSURE: 67 MMHG | WEIGHT: 171 LBS | HEART RATE: 66 BPM | OXYGEN SATURATION: 98 %

## 2021-08-16 DIAGNOSIS — R11.0 NAUSEA: Primary | ICD-10-CM

## 2021-08-16 DIAGNOSIS — D50.0 IRON DEFICIENCY ANEMIA DUE TO CHRONIC BLOOD LOSS: ICD-10-CM

## 2021-08-16 DIAGNOSIS — K31.7 POLYP OF STOMACH AND DUODENUM: ICD-10-CM

## 2021-08-16 PROCEDURE — 88342 IMHCHEM/IMCYTCHM 1ST ANTB: CPT | Performed by: PATHOLOGY

## 2021-08-16 PROCEDURE — 88305 TISSUE EXAM BY PATHOLOGIST: CPT | Performed by: PATHOLOGY

## 2021-08-16 PROCEDURE — 88313 SPECIAL STAINS GROUP 2: CPT | Performed by: PATHOLOGY

## 2021-08-16 PROCEDURE — 43237 ENDOSCOPIC US EXAM ESOPH: CPT | Performed by: INTERNAL MEDICINE

## 2021-08-16 PROCEDURE — 88341 IMHCHEM/IMCYTCHM EA ADD ANTB: CPT | Performed by: PATHOLOGY

## 2021-08-16 PROCEDURE — 43254 EGD ENDO MUCOSAL RESECTION: CPT | Performed by: INTERNAL MEDICINE

## 2021-08-16 RX ORDER — LIDOCAINE HYDROCHLORIDE 10 MG/ML
INJECTION, SOLUTION EPIDURAL; INFILTRATION; INTRACAUDAL; PERINEURAL AS NEEDED
Status: DISCONTINUED | OUTPATIENT
Start: 2021-08-16 | End: 2021-08-16

## 2021-08-16 RX ORDER — ONDANSETRON 4 MG/1
4 TABLET, FILM COATED ORAL EVERY 8 HOURS PRN
Qty: 30 TABLET | Refills: 0 | Status: SHIPPED | OUTPATIENT
Start: 2021-08-16

## 2021-08-16 RX ORDER — PROPOFOL 10 MG/ML
INJECTION, EMULSION INTRAVENOUS AS NEEDED
Status: DISCONTINUED | OUTPATIENT
Start: 2021-08-16 | End: 2021-08-16

## 2021-08-16 RX ORDER — SODIUM CHLORIDE, SODIUM LACTATE, POTASSIUM CHLORIDE, CALCIUM CHLORIDE 600; 310; 30; 20 MG/100ML; MG/100ML; MG/100ML; MG/100ML
INJECTION, SOLUTION INTRAVENOUS CONTINUOUS PRN
Status: DISCONTINUED | OUTPATIENT
Start: 2021-08-16 | End: 2021-08-16

## 2021-08-16 RX ADMIN — LIDOCAINE HYDROCHLORIDE 50 MG: 10 INJECTION, SOLUTION EPIDURAL; INFILTRATION; INTRACAUDAL; PERINEURAL at 08:08

## 2021-08-16 RX ADMIN — PROPOFOL 50 MG: 10 INJECTION, EMULSION INTRAVENOUS at 08:13

## 2021-08-16 RX ADMIN — PROPOFOL 200 MG: 10 INJECTION, EMULSION INTRAVENOUS at 08:08

## 2021-08-16 RX ADMIN — PROPOFOL 50 MG: 10 INJECTION, EMULSION INTRAVENOUS at 08:20

## 2021-08-16 RX ADMIN — PROPOFOL 50 MG: 10 INJECTION, EMULSION INTRAVENOUS at 08:17

## 2021-08-16 RX ADMIN — PROPOFOL 50 MG: 10 INJECTION, EMULSION INTRAVENOUS at 08:27

## 2021-08-16 RX ADMIN — SODIUM CHLORIDE, SODIUM LACTATE, POTASSIUM CHLORIDE, AND CALCIUM CHLORIDE: .6; .31; .03; .02 INJECTION, SOLUTION INTRAVENOUS at 07:41

## 2021-08-16 RX ADMIN — PROPOFOL 50 MG: 10 INJECTION, EMULSION INTRAVENOUS at 08:23

## 2021-08-16 NOTE — ANESTHESIA PREPROCEDURE EVALUATION
Procedure:  ENDOSCOPIC ULTRASOUND (UPPER)    Stress test ordered 2020 - patient reports that it was negative  No recent Echos  Previous in 2014 WNL    Seemingly unremarkable anesthesia July 2021 for EGD/Colon    Relevant Problems   CARDIO   (+) Precordial pain      GI/HEPATIC   (+) GERD (gastroesophageal reflux disease)      HEMATOLOGY   (+) Anemia   (+) Iron deficiency anemia due to chronic blood loss      NEURO/PSYCH   (+) Tension-type headache, not intractable        Physical Exam    Airway    Mallampati score: II  TM Distance: >3 FB  Neck ROM: full     Dental   No notable dental hx     Cardiovascular  Rhythm: regular, Rate: normal, Cardiovascular exam normal    Pulmonary  Pulmonary exam normal Breath sounds clear to auscultation,     Other Findings        Anesthesia Plan  ASA Score- 2     Anesthesia Type- IV sedation with anesthesia with ASA Monitors  Additional Monitors:   Airway Plan:     Comment: Discussed risks/benefits, including medication reactions, awareness, aspiration, and serious/life threatening complications  Plan to maintain native airway with IVGA, monitored with EtCO2    Syriac speaking  Consent obtained by patient request - through a combination of spoken Khmer and daughter translation          Plan Factors-Exercise tolerance (METS): >4 METS  Patient summary reviewed  Patient instructed to abstain from smoking on day of procedure  Patient did not smoke on day of surgery  Induction- intravenous  Postoperative Plan-     Informed Consent- Anesthetic plan and risks discussed with patient and daughter  I personally reviewed this patient with the CRNA  Discussed and agreed on the Anesthesia Plan with the CRNA  Chad Bailey

## 2021-08-16 NOTE — H&P
[facility] and Physical -  [facility]  Hollie Gross Jack 48 y o  female MRN: [id number]                  HPI: Jann Martin is a 48y o  year old female who presents for endoscopic ultrasound for abdominal pain, EGD shows small gastric submucosal nodule      REVIEW OF SYSTEMS: Per the HPI, and otherwise unremarkable  Historical Information   Past Medical History:   Diagnosis Date    Abdominal pain     Anemia     Arthritis     Colon polyp     GERD (gastroesophageal reflux disease)     GI disease     History of Helicobacter pylori infection     Iron deficiency anemia     Pneumonia      Past Surgical History:   Procedure Laterality Date    CHOLECYSTECTOMY      COLONOSCOPY      MAMMO (HISTORICAL)  2018    TUBAL LIGATION      UPPER GASTROINTESTINAL ENDOSCOPY       Social History   Social History     Substance and Sexual Activity   Alcohol Use No     Social History     Substance and Sexual Activity   Drug Use No     Social History     Tobacco Use   Smoking Status Never Smoker   Smokeless Tobacco Never Used     Family History   Problem Relation Age of Onset    Hyperlipidemia Mother     Hypertension Mother     Kidney disease Mother     No Known Problems Father     Kidney disease Sister        Meds/Allergies     (Not in a hospital admission)      Allergies   Allergen Reactions    Aspirin Palpitations       Objective     /84   Pulse 78   Temp (!) 96 9 °F (36 1 °C) (Temporal)   Resp 15   Ht 5' (1 524 m)   Wt 77 6 kg (171 lb)   SpO2 100%   Breastfeeding No   BMI 33 40 kg/m²       PHYSICAL EXAM    Gen: NAD  CV: RRR  CHEST: Clear  ABD: soft, NT/ND  EXT: no edema      ASSESSMENT/PLAN:  This is a 48y o  year old female here for EGD with EUS with possible EMR, and she is stable and optimized for her procedure

## 2021-08-17 ENCOUNTER — OFFICE VISIT (OUTPATIENT)
Dept: OBGYN CLINIC | Facility: CLINIC | Age: 51
End: 2021-08-17

## 2021-08-17 VITALS
BODY MASS INDEX: 33.53 KG/M2 | DIASTOLIC BLOOD PRESSURE: 74 MMHG | WEIGHT: 170.8 LBS | HEIGHT: 60 IN | HEART RATE: 80 BPM | SYSTOLIC BLOOD PRESSURE: 106 MMHG

## 2021-08-17 DIAGNOSIS — N92.1 MENORRHAGIA WITH IRREGULAR CYCLE: Primary | ICD-10-CM

## 2021-08-17 PROCEDURE — 99203 OFFICE O/P NEW LOW 30 MIN: CPT | Performed by: OBSTETRICS & GYNECOLOGY

## 2021-08-17 PROCEDURE — 1036F TOBACCO NON-USER: CPT | Performed by: OBSTETRICS & GYNECOLOGY

## 2021-08-17 RX ORDER — TRANEXAMIC ACID 650 1/1
650 TABLET ORAL
Qty: 20 TABLET | Refills: 0 | Status: SHIPPED | OUTPATIENT
Start: 2021-08-17 | End: 2021-08-22

## 2021-08-17 RX ORDER — TRANEXAMIC ACID 650 1/1
650 TABLET ORAL
Qty: 20 TABLET | Refills: 0 | Status: SHIPPED | OUTPATIENT
Start: 2021-08-17 | End: 2021-08-17

## 2021-08-17 NOTE — PROGRESS NOTES
Subjective    The patient is a 49 yo P5 who presents today with menorrhagia for the past 3 years  She states that she bleeds heavily every 2-3 weeks, and has received IV iron infusions in the past for anemia due to this bleeding  She had previously used Depo to control her bleeding, but states that she has difficulty with hormonal medications  She reports feeling bloated when she is bleeding, but denies any issues with urinary retention or frequency  She does get constipated but she thinks that is due to her iron supplementation  She had a TVUS performed in June 2021 that showed a 10 cm uterus with multiple fibroids about 5-6 cm in size  She cannot recall her last Pap smear, and has never had an endometrial biopsy  Objective    Vitals:    08/17/21 0934   BP: 106/74   BP Location: Right arm   Patient Position: Sitting   Cuff Size: Adult   Pulse: 80   Weight: 77 5 kg (170 lb 12 8 oz)   Height: 5' (1 524 m)        Physical Exam  Constitutional:       Appearance: She is well-developed  Genitourinary:      Genitourinary Comments: Multiparous cervix, small amount of old brown blood in vaginal vault; uterus with heterogenous contour   HENT:      Head: Normocephalic and atraumatic  Eyes:      Conjunctiva/sclera: Conjunctivae normal    Cardiovascular:      Rate and Rhythm: Normal rate and regular rhythm  Heart sounds: Normal heart sounds  Pulmonary:      Effort: Pulmonary effort is normal  No respiratory distress  Breath sounds: Normal breath sounds  Abdominal:      Palpations: Abdomen is soft  Tenderness: There is no abdominal tenderness  There is no guarding or rebound  Musculoskeletal:         General: Normal range of motion  Cervical back: Normal range of motion and neck supple  Neurological:      Mental Status: She is alert and oriented to person, place, and time  Skin:     General: Skin is warm and dry     Psychiatric:         Behavior: Behavior normal          Thought Content: Thought content normal           Assessment    Patient Active Problem List   Diagnosis    Nasal congestion    Iron deficiency anemia due to chronic blood loss    Screening for cardiovascular condition    Precordial pain    Dizziness    Balance problem    Tension-type headache, not intractable    GERD (gastroesophageal reflux disease)    Anemia    Colon polyp        Plan  - Return for EMB and Pap smear  - TXA prescribed for bleeding, instructed to take only when bleeding  - Discussed possible options, including hysterectomy, IUD, and hormonal medications

## 2021-08-19 ENCOUNTER — OFFICE VISIT (OUTPATIENT)
Dept: FAMILY MEDICINE CLINIC | Facility: CLINIC | Age: 51
End: 2021-08-19
Payer: COMMERCIAL

## 2021-08-19 VITALS
BODY MASS INDEX: 33.57 KG/M2 | HEIGHT: 60 IN | HEART RATE: 76 BPM | SYSTOLIC BLOOD PRESSURE: 122 MMHG | WEIGHT: 171 LBS | DIASTOLIC BLOOD PRESSURE: 78 MMHG | RESPIRATION RATE: 16 BRPM | OXYGEN SATURATION: 98 %

## 2021-08-19 DIAGNOSIS — R53.82 CHRONIC FATIGUE: ICD-10-CM

## 2021-08-19 DIAGNOSIS — Z13.220 SCREENING, LIPID: ICD-10-CM

## 2021-08-19 DIAGNOSIS — Z12.31 ENCOUNTER FOR SCREENING MAMMOGRAM FOR BREAST CANCER: ICD-10-CM

## 2021-08-19 DIAGNOSIS — D50.0 IRON DEFICIENCY ANEMIA DUE TO CHRONIC BLOOD LOSS: ICD-10-CM

## 2021-08-19 DIAGNOSIS — Z11.4 SCREENING FOR HIV (HUMAN IMMUNODEFICIENCY VIRUS): ICD-10-CM

## 2021-08-19 DIAGNOSIS — R42 DIZZINESS: ICD-10-CM

## 2021-08-19 DIAGNOSIS — G44.219 EPISODIC TENSION-TYPE HEADACHE, NOT INTRACTABLE: ICD-10-CM

## 2021-08-19 DIAGNOSIS — Z11.59 NEED FOR HEPATITIS C SCREENING TEST: ICD-10-CM

## 2021-08-19 DIAGNOSIS — R26.89 BALANCE PROBLEM: Primary | ICD-10-CM

## 2021-08-19 PROCEDURE — 99214 OFFICE O/P EST MOD 30 MIN: CPT | Performed by: FAMILY MEDICINE

## 2021-08-19 PROCEDURE — 3008F BODY MASS INDEX DOCD: CPT | Performed by: OBSTETRICS & GYNECOLOGY

## 2021-08-19 PROCEDURE — 3008F BODY MASS INDEX DOCD: CPT | Performed by: FAMILY MEDICINE

## 2021-08-19 PROCEDURE — 3725F SCREEN DEPRESSION PERFORMED: CPT | Performed by: FAMILY MEDICINE

## 2021-08-19 PROCEDURE — 1036F TOBACCO NON-USER: CPT | Performed by: FAMILY MEDICINE

## 2021-08-19 NOTE — PROGRESS NOTES
Assessment/Plan:    Problem List Items Addressed This Visit        Other    Screening, lipid    Relevant Orders    Lipid panel    Dizziness    Relevant Orders    CBC and differential    TSH, 3rd generation    Ambulatory referral to Neurology    Vitamin B12    Ambulatory referral to Physical Therapy    CT head wo contrast    Balance problem - Primary    Relevant Orders    CBC and differential    Comprehensive metabolic panel    TSH, 3rd generation    Ambulatory referral to Neurology    Vitamin B12    Ambulatory referral to Physical Therapy    CT head wo contrast    Tension-type headache, not intractable    Relevant Orders    CBC and differential    TSH, 3rd generation    Ambulatory referral to Neurology    CT head wo contrast    Anemia    Relevant Orders    CBC and differential    Ferritin    Chronic fatigue    Need for hepatitis C screening test    Relevant Orders    Hepatitis C antibody    BMI 33 0-33 9,adult      BMI Counseling: Body mass index is 33 4 kg/m²  The BMI is above normal  Nutrition recommendations include decreasing portion sizes, encouraging healthy choices of fruits and vegetables, decreasing fast food intake, consuming healthier snacks, limiting drinks that contain sugar, moderation in carbohydrate intake and reducing intake of cholesterol  Exercise recommendations include exercising 3-5 times per week  advised to get labs and then follow-up    Chief Complaint   Patient presents with    Headache    Dizziness     for 6 months       Subjective:   Patient ID: Diana Gutiérrez is a 48 y o  female  She has a long history of dizziness and she feels off balance kind of feeling when somebody drinks heavy alcohol but she does not drink alcohol  She has no fall   daily  intermittent headaches, she gets headache 3 days before her periods and she gets very heavy menses she seen gynecologist, they are treating her,   she came with her daughter and she speaks [place] and her daughter translates for her   she also feels fatigue all the time and feels sleepy and wants to lay in the bed, no history of tick bite      Review of Systems   Constitutional: Positive for fatigue  Negative for activity change, appetite change, chills, fever and unexpected weight change  HENT: Negative for congestion, ear discharge, ear pain, nosebleeds, postnasal drip, rhinorrhea, sinus pressure, sneezing, sore throat, trouble swallowing and voice change  Eyes: Negative for photophobia, pain, discharge, redness and itching  Respiratory: Negative for cough, chest tightness, shortness of breath and wheezing  Cardiovascular: Negative for chest pain, palpitations and leg swelling  Gastrointestinal: Negative for abdominal pain, constipation, diarrhea, nausea and vomiting  Endocrine: Negative for polyuria  Genitourinary: Positive for menstrual problem  Negative for dysuria, frequency and urgency  Musculoskeletal: Negative for arthralgias, back pain, myalgias and neck pain  Skin: Negative for color change, pallor and rash  Allergic/Immunologic: Negative for environmental allergies and food allergies  Neurological: Positive for dizziness, weakness, light-headedness and headaches  Feels off balance   Hematological: Negative for adenopathy  Does not bruise/bleed easily  Psychiatric/Behavioral: Negative for behavioral problems  The patient is not nervous/anxious  Objective:  Physical Exam  Vitals and nursing note reviewed  Constitutional:       Appearance: She is well-developed  HENT:      Head: Normocephalic and atraumatic  Mouth/Throat:      Pharynx: No oropharyngeal exudate  Eyes:      General: No scleral icterus  Right eye: No discharge  Left eye: No discharge  Extraocular Movements: Extraocular movements intact  Conjunctiva/sclera: Conjunctivae normal       Pupils: Pupils are equal, round, and reactive to light  Neck:      Thyroid: No thyromegaly        Trachea: No tracheal deviation  Cardiovascular:      Rate and Rhythm: Normal rate and regular rhythm  Heart sounds: Normal heart sounds  No murmur heard  Pulmonary:      Effort: Pulmonary effort is normal  No respiratory distress  Breath sounds: Normal breath sounds  No wheezing or rales  Abdominal:      General: There is no distension  Palpations: Abdomen is soft  There is no mass  Tenderness: There is no abdominal tenderness  There is no rebound  Musculoskeletal:         General: No swelling  Normal range of motion  Cervical back: Normal range of motion and neck supple  Right lower leg: No edema  Left lower leg: Edema present  Lymphadenopathy:      Cervical: No cervical adenopathy  Skin:     General: Skin is warm  Coloration: Skin is not pale  Findings: No erythema or rash  Neurological:      General: No focal deficit present  Mental Status: She is alert and oriented to person, place, and time  Cranial Nerves: No cranial nerve deficit  Gait: Gait normal       Deep Tendon Reflexes: Reflexes are normal and symmetric  Psychiatric:         Behavior: Behavior normal          Thought Content:  Thought content normal          Judgment: Judgment normal             Past Surgical History:   Procedure Laterality Date    CHOLECYSTECTOMY      COLONOSCOPY      MAMMO (HISTORICAL)  2018    TUBAL LIGATION      UPPER GASTROINTESTINAL ENDOSCOPY         Family History   Problem Relation Age of Onset    Hyperlipidemia Mother     Hypertension Mother     Kidney disease Mother     No Known Problems Father     Kidney disease Sister          Current Outpatient Medications:     ascorbic acid (VITAMIN C) 500 MG tablet, Take 500 mg by mouth daily, Disp: , Rfl:     ferrous sulfate 220 (44 Fe) mg/5 mL solution, Take by mouth daily, Disp: , Rfl:     fluticasone (FLONASE) 50 mcg/act nasal spray, 2 sprays into each nostril daily, Disp: 16 g, Rfl: 1    megestrol (MEGACE) 40 mg tablet, , Disp: , Rfl:     metroNIDAZOLE (FLAGYL) 500 mg tablet, Take 500 mg by mouth Three times a day , Disp: , Rfl:     ondansetron (ZOFRAN) 4 mg tablet, Take 1 tablet (4 mg total) by mouth every 8 (eight) hours as needed for nausea or vomiting, Disp: 30 tablet, Rfl: 0    pantoprazole (PROTONIX) 40 mg tablet, Take 1 tablet (40 mg total) by mouth daily 1/2 hour before breakfast, Disp: 90 tablet, Rfl: 1    Tranexamic Acid 650 MG TABS, Take 1 tablet (650 mg total) by mouth every 30 (thirty) days for 5 days Take tablet while bleeding only, Disp: 20 tablet, Rfl: 0    triamcinolone (KENALOG) 0 025 % cream, Apply topically 2 (two) times a day, Disp: 30 g, Rfl: 0    Allergies   Allergen Reactions    Aspirin Palpitations       Vitals:    08/19/21 1048   BP: 122/78   Pulse: 76   Resp: 16   SpO2: 98%   Weight: 77 6 kg (171 lb)   Height: 5' (1 524 m)

## 2021-08-31 ENCOUNTER — RA CDI HCC (OUTPATIENT)
Dept: OTHER | Facility: HOSPITAL | Age: 51
End: 2021-08-31

## 2021-08-31 NOTE — PROGRESS NOTES
[facility]       Chart reviewed, no opportunity found: CHART REVIEWED, NO OPPORTUNITY FOUND                        Patients insurance company:  [employer] [facility] (Medicare Advantage and Commercial)

## 2021-09-03 ENCOUNTER — APPOINTMENT (OUTPATIENT)
Dept: LAB | Facility: CLINIC | Age: 51
End: 2021-09-03
Payer: COMMERCIAL

## 2021-09-03 DIAGNOSIS — Z13.220 SCREENING, LIPID: ICD-10-CM

## 2021-09-03 DIAGNOSIS — R42 DIZZINESS: ICD-10-CM

## 2021-09-03 DIAGNOSIS — R26.89 BALANCE PROBLEM: ICD-10-CM

## 2021-09-03 DIAGNOSIS — G44.219 EPISODIC TENSION-TYPE HEADACHE, NOT INTRACTABLE: ICD-10-CM

## 2021-09-03 DIAGNOSIS — D50.0 IRON DEFICIENCY ANEMIA DUE TO CHRONIC BLOOD LOSS: ICD-10-CM

## 2021-09-03 DIAGNOSIS — Z11.4 SCREENING FOR HIV (HUMAN IMMUNODEFICIENCY VIRUS): ICD-10-CM

## 2021-09-03 DIAGNOSIS — Z11.59 NEED FOR HEPATITIS C SCREENING TEST: ICD-10-CM

## 2021-09-03 LAB
ALBUMIN SERPL BCP-MCNC: 3.3 G/DL (ref 3.5–5)
ALP SERPL-CCNC: 80 U/L (ref 46–116)
ALT SERPL W P-5'-P-CCNC: 32 U/L (ref 12–78)
ANION GAP SERPL CALCULATED.3IONS-SCNC: 4 MMOL/L (ref 4–13)
AST SERPL W P-5'-P-CCNC: 18 U/L (ref 5–45)
BASOPHILS # BLD AUTO: 0.06 THOUSANDS/ΜL (ref 0–0.1)
BASOPHILS NFR BLD AUTO: 1 % (ref 0–1)
BILIRUB SERPL-MCNC: 0.42 MG/DL (ref 0.2–1)
BUN SERPL-MCNC: 12 MG/DL (ref 5–25)
CALCIUM ALBUM COR SERPL-MCNC: 9.1 MG/DL (ref 8.3–10.1)
CALCIUM SERPL-MCNC: 8.5 MG/DL (ref 8.3–10.1)
CHLORIDE SERPL-SCNC: 107 MMOL/L (ref 100–108)
CHOLEST SERPL-MCNC: 176 MG/DL (ref 50–200)
CO2 SERPL-SCNC: 27 MMOL/L (ref 21–32)
CREAT SERPL-MCNC: 0.47 MG/DL (ref 0.6–1.3)
EOSINOPHIL # BLD AUTO: 0.3 THOUSAND/ΜL (ref 0–0.61)
EOSINOPHIL NFR BLD AUTO: 4 % (ref 0–6)
ERYTHROCYTE [DISTWIDTH] IN BLOOD BY AUTOMATED COUNT: 17.7 % (ref 11.6–15.1)
FERRITIN SERPL-MCNC: 16 NG/ML (ref 8–388)
GFR SERPL CREATININE-BSD FRML MDRD: 116 ML/MIN/1.73SQ M
GLUCOSE P FAST SERPL-MCNC: 90 MG/DL (ref 65–99)
HCT VFR BLD AUTO: 39 % (ref 34.8–46.1)
HCV AB SER QL: NORMAL
HDLC SERPL-MCNC: 66 MG/DL
HGB BLD-MCNC: 12.6 G/DL (ref 11.5–15.4)
IMM GRANULOCYTES # BLD AUTO: 0.02 THOUSAND/UL (ref 0–0.2)
IMM GRANULOCYTES NFR BLD AUTO: 0 % (ref 0–2)
LDLC SERPL CALC-MCNC: 90 MG/DL (ref 0–100)
LYMPHOCYTES # BLD AUTO: 1.76 THOUSANDS/ΜL (ref 0.6–4.47)
LYMPHOCYTES NFR BLD AUTO: 20 % (ref 14–44)
MCH RBC QN AUTO: 26.4 PG (ref 26.8–34.3)
MCHC RBC AUTO-ENTMCNC: 32.3 G/DL (ref 31.4–37.4)
MCV RBC AUTO: 82 FL (ref 82–98)
MONOCYTES # BLD AUTO: 0.55 THOUSAND/ΜL (ref 0.17–1.22)
MONOCYTES NFR BLD AUTO: 6 % (ref 4–12)
NEUTROPHILS # BLD AUTO: 5.95 THOUSANDS/ΜL (ref 1.85–7.62)
NEUTS SEG NFR BLD AUTO: 69 % (ref 43–75)
NONHDLC SERPL-MCNC: 110 MG/DL
NRBC BLD AUTO-RTO: 0 /100 WBCS
PLATELET # BLD AUTO: 294 THOUSANDS/UL (ref 149–390)
PMV BLD AUTO: 11.1 FL (ref 8.9–12.7)
POTASSIUM SERPL-SCNC: 3.8 MMOL/L (ref 3.5–5.3)
PROT SERPL-MCNC: 7.5 G/DL (ref 6.4–8.2)
RBC # BLD AUTO: 4.77 MILLION/UL (ref 3.81–5.12)
SODIUM SERPL-SCNC: 138 MMOL/L (ref 136–145)
TRIGL SERPL-MCNC: 98 MG/DL
TSH SERPL DL<=0.05 MIU/L-ACNC: 2.36 UIU/ML (ref 0.36–3.74)
VIT B12 SERPL-MCNC: >6000 PG/ML (ref 100–900)
WBC # BLD AUTO: 8.64 THOUSAND/UL (ref 4.31–10.16)

## 2021-09-03 PROCEDURE — 84443 ASSAY THYROID STIM HORMONE: CPT

## 2021-09-03 PROCEDURE — 85025 COMPLETE CBC W/AUTO DIFF WBC: CPT

## 2021-09-03 PROCEDURE — 80061 LIPID PANEL: CPT

## 2021-09-03 PROCEDURE — 36415 COLL VENOUS BLD VENIPUNCTURE: CPT

## 2021-09-03 PROCEDURE — 82607 VITAMIN B-12: CPT

## 2021-09-03 PROCEDURE — 87389 HIV-1 AG W/HIV-1&-2 AB AG IA: CPT

## 2021-09-03 PROCEDURE — 80053 COMPREHEN METABOLIC PANEL: CPT

## 2021-09-03 PROCEDURE — 82728 ASSAY OF FERRITIN: CPT

## 2021-09-03 PROCEDURE — 86803 HEPATITIS C AB TEST: CPT

## 2021-09-05 LAB — HIV 1+2 AB+HIV1 P24 AG SERPL QL IA: NORMAL

## 2021-09-13 ENCOUNTER — TELEPHONE (OUTPATIENT)
Dept: OBGYN CLINIC | Facility: CLINIC | Age: 51
End: 2021-09-13

## 2021-10-07 ENCOUNTER — PROCEDURE VISIT (OUTPATIENT)
Dept: OBGYN CLINIC | Facility: CLINIC | Age: 51
End: 2021-10-07

## 2021-10-07 VITALS
HEIGHT: 60 IN | DIASTOLIC BLOOD PRESSURE: 79 MMHG | BODY MASS INDEX: 34.55 KG/M2 | HEART RATE: 69 BPM | SYSTOLIC BLOOD PRESSURE: 117 MMHG | WEIGHT: 176 LBS

## 2021-10-07 DIAGNOSIS — N92.6 ABNORMAL MENSES: Primary | ICD-10-CM

## 2021-10-07 DIAGNOSIS — Z12.4 CERVICAL CANCER SCREENING: ICD-10-CM

## 2021-10-07 LAB — SL AMB POCT URINE HCG: NEGATIVE

## 2021-10-07 PROCEDURE — G0476 HPV COMBO ASSAY CA SCREEN: HCPCS | Performed by: OBSTETRICS & GYNECOLOGY

## 2021-10-07 PROCEDURE — G0145 SCR C/V CYTO,THINLAYER,RESCR: HCPCS | Performed by: OBSTETRICS & GYNECOLOGY

## 2021-10-07 PROCEDURE — 88342 IMHCHEM/IMCYTCHM 1ST ANTB: CPT | Performed by: PATHOLOGY

## 2021-10-07 PROCEDURE — 88305 TISSUE EXAM BY PATHOLOGIST: CPT | Performed by: PATHOLOGY

## 2021-10-07 PROCEDURE — 81025 URINE PREGNANCY TEST: CPT | Performed by: OBSTETRICS & GYNECOLOGY

## 2021-10-07 PROCEDURE — 58100 BIOPSY OF UTERUS LINING: CPT | Performed by: OBSTETRICS & GYNECOLOGY

## 2021-10-11 LAB
HPV HR 12 DNA CVX QL NAA+PROBE: NEGATIVE
HPV16 DNA CVX QL NAA+PROBE: NEGATIVE
HPV18 DNA CVX QL NAA+PROBE: NEGATIVE

## 2021-10-12 LAB
LAB AP GYN PRIMARY INTERPRETATION: NORMAL
Lab: NORMAL

## 2021-10-21 ENCOUNTER — PATIENT OUTREACH (OUTPATIENT)
Dept: OBGYN CLINIC | Facility: CLINIC | Age: 51
End: 2021-10-21

## 2021-10-21 ENCOUNTER — OFFICE VISIT (OUTPATIENT)
Dept: OBGYN CLINIC | Facility: CLINIC | Age: 51
End: 2021-10-21

## 2021-10-21 VITALS
SYSTOLIC BLOOD PRESSURE: 115 MMHG | BODY MASS INDEX: 34.51 KG/M2 | WEIGHT: 175.8 LBS | DIASTOLIC BLOOD PRESSURE: 79 MMHG | HEART RATE: 69 BPM | HEIGHT: 60 IN

## 2021-10-21 DIAGNOSIS — Z78.9 LANGUAGE BARRIER: Primary | ICD-10-CM

## 2021-10-21 PROCEDURE — 99213 OFFICE O/P EST LOW 20 MIN: CPT | Performed by: OBSTETRICS & GYNECOLOGY

## 2022-03-09 ENCOUNTER — TELEPHONE (OUTPATIENT)
Dept: GASTROENTEROLOGY | Facility: CLINIC | Age: 52
End: 2022-03-09

## 2022-03-09 NOTE — TELEPHONE ENCOUNTER
Returned call to schedule patient for missed Fu appt  From September  Scheduled with Dr Gwendalyn Rinne 4/29/22 (soonest in [place])  Daughter reports mom is experiencing bloating and discomfort and is taking medications that were given    Please call   Thank you

## 2022-04-22 ENCOUNTER — RA CDI HCC (OUTPATIENT)
Dept: OTHER | Facility: HOSPITAL | Age: 52
End: 2022-04-22

## 2022-04-22 NOTE — PROGRESS NOTES
[facility] 75  coding opportunities       Chart reviewed, no opportunity found: CHART REVIEWED, NO OPPORTUNITY FOUND        Patients Insurance        Commercial Insurance: [employer]

## 2022-04-29 ENCOUNTER — OFFICE VISIT (OUTPATIENT)
Dept: FAMILY MEDICINE CLINIC | Facility: CLINIC | Age: 52
End: 2022-04-29
Payer: COMMERCIAL

## 2022-04-29 ENCOUNTER — OFFICE VISIT (OUTPATIENT)
Dept: GASTROENTEROLOGY | Facility: CLINIC | Age: 52
End: 2022-04-29
Payer: COMMERCIAL

## 2022-04-29 VITALS
BODY MASS INDEX: 35.34 KG/M2 | SYSTOLIC BLOOD PRESSURE: 150 MMHG | HEIGHT: 60 IN | WEIGHT: 180 LBS | DIASTOLIC BLOOD PRESSURE: 96 MMHG | HEART RATE: 71 BPM

## 2022-04-29 VITALS
WEIGHT: 182 LBS | HEART RATE: 79 BPM | OXYGEN SATURATION: 99 % | DIASTOLIC BLOOD PRESSURE: 84 MMHG | RESPIRATION RATE: 16 BRPM | TEMPERATURE: 97.1 F | HEIGHT: 60 IN | SYSTOLIC BLOOD PRESSURE: 120 MMHG | BODY MASS INDEX: 35.73 KG/M2

## 2022-04-29 DIAGNOSIS — Z86.010 HISTORY OF COLON POLYPS: ICD-10-CM

## 2022-04-29 DIAGNOSIS — D25.9 UTERINE LEIOMYOMA, UNSPECIFIED LOCATION: ICD-10-CM

## 2022-04-29 DIAGNOSIS — R11.0 NAUSEA: ICD-10-CM

## 2022-04-29 DIAGNOSIS — K58.1 IRRITABLE BOWEL SYNDROME WITH CONSTIPATION: ICD-10-CM

## 2022-04-29 DIAGNOSIS — Z00.00 ANNUAL PHYSICAL EXAM: Primary | ICD-10-CM

## 2022-04-29 DIAGNOSIS — M79.10 MYALGIA: ICD-10-CM

## 2022-04-29 DIAGNOSIS — N92.1 MENORRHAGIA WITH IRREGULAR CYCLE: ICD-10-CM

## 2022-04-29 DIAGNOSIS — M25.50 PAIN IN JOINT, MULTIPLE SITES: ICD-10-CM

## 2022-04-29 DIAGNOSIS — Z12.31 BREAST CANCER SCREENING BY MAMMOGRAM: ICD-10-CM

## 2022-04-29 DIAGNOSIS — R14.0 BLOATING: Primary | ICD-10-CM

## 2022-04-29 DIAGNOSIS — K21.9 GASTROESOPHAGEAL REFLUX DISEASE WITHOUT ESOPHAGITIS: ICD-10-CM

## 2022-04-29 PROBLEM — R26.89 BALANCE PROBLEM: Status: RESOLVED | Noted: 2020-02-05 | Resolved: 2022-04-29

## 2022-04-29 PROBLEM — R42 DIZZINESS: Status: RESOLVED | Noted: 2020-02-05 | Resolved: 2022-04-29

## 2022-04-29 PROBLEM — R07.2 PRECORDIAL PAIN: Status: RESOLVED | Noted: 2020-02-05 | Resolved: 2022-04-29

## 2022-04-29 PROBLEM — R09.81 NASAL CONGESTION: Status: RESOLVED | Noted: 2020-01-16 | Resolved: 2022-04-29

## 2022-04-29 PROBLEM — K76.0 FATTY LIVER: Status: ACTIVE | Noted: 2022-04-29

## 2022-04-29 PROBLEM — Z13.220 SCREENING, LIPID: Status: RESOLVED | Noted: 2020-01-16 | Resolved: 2022-04-29

## 2022-04-29 PROBLEM — D50.0 IRON DEFICIENCY ANEMIA DUE TO CHRONIC BLOOD LOSS: Status: RESOLVED | Noted: 2020-01-16 | Resolved: 2022-04-29

## 2022-04-29 PROCEDURE — 99214 OFFICE O/P EST MOD 30 MIN: CPT | Performed by: INTERNAL MEDICINE

## 2022-04-29 PROCEDURE — 3725F SCREEN DEPRESSION PERFORMED: CPT | Performed by: FAMILY MEDICINE

## 2022-04-29 PROCEDURE — 99396 PREV VISIT EST AGE 40-64: CPT | Performed by: FAMILY MEDICINE

## 2022-04-29 PROCEDURE — 1036F TOBACCO NON-USER: CPT | Performed by: FAMILY MEDICINE

## 2022-04-29 PROCEDURE — 3008F BODY MASS INDEX DOCD: CPT | Performed by: FAMILY MEDICINE

## 2022-04-29 RX ORDER — MELOXICAM 15 MG/1
15 TABLET ORAL DAILY
COMMUNITY
Start: 2022-01-18

## 2022-04-29 RX ORDER — FERROUS SULFATE 325(65) MG
325 TABLET ORAL
COMMUNITY

## 2022-04-29 RX ORDER — LINACLOTIDE 290 UG/1
290 CAPSULE, GELATIN COATED ORAL DAILY
Qty: 30 CAPSULE | Refills: 1 | Status: SHIPPED | OUTPATIENT
Start: 2022-04-29

## 2022-04-29 NOTE — PROGRESS NOTES
[facility] - Outpatient Follow-up Note  Alberto Erickson 46 y o  female MRN: [id number]  Encounter: [id number]          ASSESSMENT AND PLAN:      1  Bloating  Patient is complaining worsening of bloating and nausea, she also complained change in bowel habit with worsening of constipation  She is taking pantoprazole 40 milligram p o  q day, she is up-to-date with EGD and colonoscopy which both came back okay, mild gastritis with GERD and small colon polyp which was removed  Will schedule for gastric emptying study to rule out gastroparesis  Will also start on Linzess 290 microgram p o  q day  - NM gastric emptying; Future  - linaCLOtide (Linzess) 290 MCG CAPS; Take 1 capsule by mouth in the morning  Dispense: 30 capsule; Refill: 1    2  Nausea  Continue with Zofran as needed  - NM gastric emptying; Future    3  Gastroesophageal reflux disease without esophagitis  Continue with Protonix 40 milligram p o  q day, EGD and EUS was done, EUS shows small gastric submucosal nodule which was removed with EMR resection and biopsy came back okay    4  Menorrhagia with irregular cycle  She is still having intermittent vaginal bleeding, lower abdominal pain, pelvic and transvaginal ultrasound confirmed evidence of uterine fibroid, advised to follow-up with gynecologist    5  Uterine leiomyoma, unspecified location  Follow-up with a gynecologist    6  Irritable bowel syndrome with constipation  Up-to-date with screening colonoscopy  - linaCLOtide (Linzess) 290 MCG CAPS; Take 1 capsule by mouth in the morning  Dispense: 30 capsule;  Refill: 1    ______________________________________________________________________    SUBJECTIVE:  Patient seen and examined, she come for follow-up, she is complaining worsening of bloating and nausea, her reflux symptoms are well controlled with pantoprazole 40 milligram p o  q day, she also complained vaginal bleeding, lower abdominal pain and constipation problem also, she is status post EGD and colonoscopy and subsequently EUS was done for gastric submucosal nodule which was removed with EMR resection  She currently denies any rectal bleeding or melena  She had a multiple tests in the past including ultrasound of abdomen which shows evidence of fatty changes in the liver, pelvic ultrasound confirmed evidence of uterine fibroid  She denies any weight loss, no dysphagia      REVIEW OF SYSTEMS IS OTHERWISE NEGATIVE        Historical Information   Past Medical History:   Diagnosis Date    Abdominal pain     Anemia     Arthritis     Colon polyp     GERD (gastroesophageal reflux disease)     GI disease     History of Helicobacter pylori infection     Iron deficiency     Iron deficiency anemia     Pneumonia      Past Surgical History:   Procedure Laterality Date    CHOLECYSTECTOMY      COLONOSCOPY      MAMMO (HISTORICAL)  2018    TUBAL LIGATION      UPPER GASTROINTESTINAL ENDOSCOPY       Social History   Social History     Substance and Sexual Activity   Alcohol Use No     Social History     Substance and Sexual Activity   Drug Use No     Social History     Tobacco Use   Smoking Status Never Smoker   Smokeless Tobacco Never Used     Family History   Problem Relation Age of Onset    Hyperlipidemia Mother     Hypertension Mother     Kidney disease Mother     No Known Problems Father     Kidney disease Sister        Meds/Allergies       Current Outpatient Medications:     ascorbic acid (VITAMIN C) 500 MG tablet    ferrous sulfate 325 (65 Fe) mg tablet    pantoprazole (PROTONIX) 40 mg tablet    ferrous sulfate 220 (44 Fe) mg/5 mL solution    fluticasone (FLONASE) 50 mcg/act nasal spray    linaCLOtide (Linzess) 290 MCG CAPS    medroxyPROGESTERone (PROVERA) 10 mg tablet    megestrol (MEGACE) 40 mg tablet    meloxicam (MOBIC) 15 mg tablet    metroNIDAZOLE (FLAGYL) 500 mg tablet    ondansetron (ZOFRAN) 4 mg tablet    triamcinolone (KENALOG) 0 025 % cream    Allergies   Allergen Reactions    Aspirin Palpitations           Objective     Blood pressure 150/96, pulse 71, height 5' (1 524 m), weight 81 6 kg (180 lb), not currently breastfeeding  Body mass index is 35 15 kg/m²  PHYSICAL EXAM:      General Appearance:   Alert, cooperative, no distress   HEENT:   Normocephalic, atraumatic, anicteric      Neck:  Supple, symmetrical, trachea midline   Lungs:   Clear to auscultation bilaterally; no rales, rhonchi or wheezing; respirations unlabored    Heart[de-identified]   Regular rate and rhythm; no murmur, rub, or gallop  Abdomen:   Soft, non-tender, non-distended; normal bowel sounds; no masses, no organomegaly    Genitalia:   Deferred    Rectal:   Deferred    Extremities:  No cyanosis, clubbing or edema    Pulses:  2+ and symmetric    Skin:  No jaundice, rashes, or lesions    Lymph nodes:  No palpable cervical lymphadenopathy        Lab Results:   No visits with results within 1 Day(s) from this visit  Latest known visit with results is:   Procedure visit on 10/07/2021   Component Date Value    URINE HCG 10/07/2021 Negative     Case Report 10/07/2021                      Value:Gynecologic Cytology Report                       Case: Daina Cerna Provider:  Leonel Coreas MD     Collected:           10/07/2021 1103              Ordering Location:     [place] [id number]      Received:            10/07/2021 1103                                     [facility]                                                        First Screen:          Ursula Ragland                                                               Specimen:    LIQUID-BASED PAP, SCREENING, Cervix                                                        Primary Interpretation 10/07/2021 Negative for intraepithelial lesion or malignancy     Specimen Adequacy 10/07/2021 Satisfactory for evaluation  Endocervical/transformation zone component present   Additional Information 10/07/2021                      Value: This result contains rich text formatting which cannot be displayed here   Case Report 10/07/2021                      Value:Surgical Pathology Report                         Case: [id number]                                   Authorizing Provider:  Chandra Pollard MD     Collected:           10/07/2021 1103              Ordering Location:     [place] [address]      Received:            10/07/2021 1103                                     [facility]                                                        Pathologist:           Franco Dance, MD                                                        Specimen:    Endometrium                                                                                Final Diagnosis 10/07/2021                      Value: This result contains rich text formatting which cannot be displayed here   Additional Information 10/07/2021                      Value: This result contains rich text formatting which cannot be displayed here  Cesar Ocampo Gross Description 10/07/2021                      Value: This result contains rich text formatting which cannot be displayed here   HPV Other HR 10/07/2021 Negative     HPV16 10/07/2021 Negative     HPV18 10/07/2021 Negative          Radiology Results:   No results found  Answers for HPI/ROS submitted by the patient on 4/29/2022  Chronicity: recurrent  Onset: more than 1 year ago  Onset quality: gradual  Frequency: every several days  Episode duration: 8 hours  Progression since onset: gradually improving  Pain location: epigastric region  Pain - numeric: 5/10  Pain quality: aching, burning, sharp  Radiates to: epigastric region  anorexia: Yes  arthralgias: No  belching: Yes  constipation: No  diarrhea: No  dysuria: No  fever: No  flatus: No  frequency: No  headaches: Yes  hematochezia: No  hematuria: No  melena: No  myalgias: No  nausea: Yes  weight loss: No  vomiting: No  Aggravated by: nothing  Relieved by: nothing

## 2022-04-29 NOTE — PROGRESS NOTES
Assessment/Plan:         Problem List Items Addressed This Visit        Digestive    GERD (gastroesophageal reflux disease)     Ok on meds            Other    History of colon polyps     Colonoscopy up to date         Annual physical exam - Primary     Check routine labs           Relevant Orders    CBC and differential    Comprehensive metabolic panel    Lipid panel    Urinalysis with microscopic    Iron Panel (Includes Ferritin, Iron Sat%, Iron, and TIBC)    Pain in joint, multiple sites     Check labs         Relevant Orders    RF Screen w/ Reflex to Titer    Lyme Total Antibody Profile with reflex to WB    Sedimentation rate, automated    BLADIMIR Comprehensive Panel    Ambulatory Referral to Rheumatology    Myalgia     Check CK         Relevant Orders    Creatine Kinase, Total    Ambulatory Referral to Rheumatology      Other Visit Diagnoses     Breast cancer screening by mammogram        Relevant Orders    Mammo screening bilateral w 3d & cad            Subjective: new pt here for physical no insurance for many years has hx of anemia needs labs mammo  Pt has had muscle and joint pains feels  Pain from head to toe 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      Patient ID: Enmanuel Julien is a 46 y o  female  HPI    The following portions of the patient's history were reviewed and updated as appropriate:   Past Medical History:  She has a past medical history of Abdominal pain, Anemia, Arthritis, Colon polyp, GERD (gastroesophageal reflux disease), GI disease, History of Helicobacter pylori infection, Iron deficiency anemia, and Pneumonia  ,  _______________________________________________________________________  Medical Problems:  does not have any pertinent problems on file ,  _______________________________________________________________________  Past Surgical History:   has a past surgical history that includes Cholecystectomy; Tubal ligation; Mammo (historical) (2018);  Colonoscopy; and Upper gastrointestinal endoscopy  ,  _______________________________________________________________________  Family History:  family history includes Hyperlipidemia in her mother; Hypertension in her mother; Kidney disease in her mother and sister; No Known Problems in her father ,  _______________________________________________________________________  Social History:   reports that she has never smoked  She has never used smokeless tobacco  She reports that she does not drink alcohol and does not use drugs  ,  _______________________________________________________________________  Allergies:  is allergic to aspirin     _______________________________________________________________________  Current Outpatient Medications   Medication Sig Dispense Refill    ascorbic acid (VITAMIN C) 500 MG tablet Take 500 mg by mouth daily      ferrous sulfate 220 (44 Fe) mg/5 mL solution Take by mouth daily      pantoprazole (PROTONIX) 40 mg tablet Take 1 tablet (40 mg total) by mouth daily 1/2 hour before breakfast 90 tablet 1    fluticasone (FLONASE) 50 mcg/act nasal spray 2 sprays into each nostril daily (Patient not taking: Reported on 4/29/2022 ) 16 g 1    medroxyPROGESTERone (PROVERA) 10 mg tablet Take 1 tablet (10 mg total) by mouth daily (Patient not taking: Reported on 4/29/2022 ) 10 tablet 3    megestrol (MEGACE) 40 mg tablet  (Patient not taking: Reported on 4/29/2022 )      meloxicam (MOBIC) 15 mg tablet Take 15 mg by mouth daily (Patient not taking: Reported on 4/29/2022 )      metroNIDAZOLE (FLAGYL) 500 mg tablet Take 500 mg by mouth Three times a day  (Patient not taking: Reported on 4/29/2022 )      ondansetron (ZOFRAN) 4 mg tablet Take 1 tablet (4 mg total) by mouth every 8 (eight) hours as needed for nausea or vomiting 30 tablet 0    triamcinolone (KENALOG) 0 025 % cream Apply topically 2 (two) times a day (Patient not taking: Reported on 4/29/2022 ) 30 g 0     No current facility-administered medications for this visit      _______________________________________________________________________  Review of Systems   Constitutional: Negative for appetite change, chills, fatigue and fever  Respiratory: Negative for cough, chest tightness and shortness of breath  Cardiovascular: Negative for chest pain, palpitations and leg swelling  Gastrointestinal: Negative for abdominal pain, constipation, diarrhea, nausea and vomiting  Genitourinary: Negative for difficulty urinating and frequency  Musculoskeletal: Positive for arthralgias and myalgias  Negative for back pain and neck pain  Joints and muscle pain for 1 5 yrs   Skin: Negative for rash  Neurological: Negative for dizziness, weakness, light-headedness, numbness and headaches  Hematological: Does not bruise/bleed easily  Psychiatric/Behavioral: Negative for dysphoric mood and sleep disturbance  The patient is not nervous/anxious  Objective:  Vitals:    04/29/22 0833   BP: 120/84   BP Location: Left arm   Patient Position: Sitting   Cuff Size: Standard   Pulse: 79   Resp: 16   Temp: (!) 97 1 °F (36 2 °C)   TempSrc: Temporal   SpO2: 99%   Weight: 82 6 kg (182 lb)   Height: 5' (1 524 m)     Body mass index is 35 54 kg/m²  Physical Exam  Vitals reviewed  Constitutional:       General: She is not in acute distress  Appearance: Normal appearance  She is well-developed  HENT:      Head: Normocephalic  Right Ear: Tympanic membrane, ear canal and external ear normal       Left Ear: Tympanic membrane, ear canal and external ear normal       Nose: Nose normal       Mouth/Throat:      Pharynx: No oropharyngeal exudate  Eyes:      General: Lids are normal       Extraocular Movements: Extraocular movements intact  Conjunctiva/sclera: Conjunctivae normal       Pupils: Pupils are equal, round, and reactive to light  Neck:      Thyroid: No thyromegaly  Vascular: No carotid bruit     Cardiovascular:      Rate and Rhythm: Normal rate and regular rhythm  Pulses: Normal pulses  Heart sounds: Normal heart sounds  No murmur heard  No friction rub  Pulmonary:      Effort: Pulmonary effort is normal  No respiratory distress  Breath sounds: Normal breath sounds  No stridor  No wheezing or rales  Chest:   Breasts: Breasts are symmetrical       Right: Normal  No swelling, bleeding, inverted nipple, mass, nipple discharge, skin change or tenderness  Left: Normal  No swelling, bleeding, inverted nipple, mass, nipple discharge, skin change or tenderness  Abdominal:      General: Bowel sounds are normal  There is no distension  Palpations: Abdomen is soft  There is no mass  Tenderness: There is no abdominal tenderness  There is no guarding  Hernia: No hernia is present  Musculoskeletal:         General: Normal range of motion  Cervical back: Full passive range of motion without pain, normal range of motion and neck supple  Lymphadenopathy:      Cervical: No cervical adenopathy  Skin:     General: Skin is warm and dry  Findings: No rash  Comments: Nl appearing moles   Neurological:      General: No focal deficit present  Mental Status: She is alert and oriented to person, place, and time  Mental status is at baseline  Cranial Nerves: No cranial nerve deficit  Sensory: No sensory deficit  Motor: No abnormal muscle tone  Coordination: Coordination normal       Gait: Gait normal       Deep Tendon Reflexes: Reflexes normal  Babinski sign absent on the right side  Psychiatric:         Mood and Affect: Mood normal          Speech: Speech normal          Behavior: Behavior normal          Thought Content: Thought content normal          Judgment: Judgment normal        BMI Counseling: Body mass index is 35 54 kg/m²   The BMI is above normal  Nutrition recommendations include 3-5 servings of fruits/vegetables daily, reducing fast food intake, consuming healthier snacks, decreasing soda and/or juice intake, moderation in carbohydrate intake and increasing intake of lean protein  Exercise recommendations include exercising 3-5 times per week and strength training exercises

## 2022-05-15 ENCOUNTER — APPOINTMENT (OUTPATIENT)
Dept: LAB | Facility: CLINIC | Age: 52
End: 2022-05-15
Payer: COMMERCIAL

## 2022-05-15 DIAGNOSIS — M25.50 PAIN IN JOINT, MULTIPLE SITES: ICD-10-CM

## 2022-05-15 DIAGNOSIS — R73.9 ELEVATED BLOOD SUGAR: ICD-10-CM

## 2022-05-15 DIAGNOSIS — Z00.00 ANNUAL PHYSICAL EXAM: ICD-10-CM

## 2022-05-15 DIAGNOSIS — M79.10 MYALGIA: ICD-10-CM

## 2022-05-15 LAB
ALBUMIN SERPL BCP-MCNC: 3.9 G/DL (ref 3.5–5)
ALP SERPL-CCNC: 62 U/L (ref 34–104)
ALT SERPL W P-5'-P-CCNC: 10 U/L (ref 7–52)
ANION GAP SERPL CALCULATED.3IONS-SCNC: 5 MMOL/L (ref 4–13)
AST SERPL W P-5'-P-CCNC: 10 U/L (ref 13–39)
BACTERIA UR QL AUTO: ABNORMAL /HPF
BASOPHILS # BLD AUTO: 0.06 THOUSANDS/ΜL (ref 0–0.1)
BASOPHILS NFR BLD AUTO: 1 % (ref 0–1)
BILIRUB SERPL-MCNC: 0.35 MG/DL (ref 0.2–1)
BILIRUB UR QL STRIP: NEGATIVE
BUN SERPL-MCNC: 14 MG/DL (ref 5–25)
CALCIUM SERPL-MCNC: 8.3 MG/DL (ref 8.4–10.2)
CHLORIDE SERPL-SCNC: 108 MMOL/L (ref 96–108)
CHOLEST SERPL-MCNC: 131 MG/DL
CK SERPL-CCNC: 76 U/L (ref 26–192)
CLARITY UR: ABNORMAL
CO2 SERPL-SCNC: 24 MMOL/L (ref 21–32)
COLOR UR: YELLOW
CREAT SERPL-MCNC: 0.48 MG/DL (ref 0.6–1.3)
EOSINOPHIL # BLD AUTO: 0.28 THOUSAND/ΜL (ref 0–0.61)
EOSINOPHIL NFR BLD AUTO: 3 % (ref 0–6)
ERYTHROCYTE [DISTWIDTH] IN BLOOD BY AUTOMATED COUNT: 17 % (ref 11.6–15.1)
ERYTHROCYTE [SEDIMENTATION RATE] IN BLOOD: 27 MM/HOUR (ref 0–29)
FERRITIN SERPL-MCNC: 11 NG/ML (ref 8–388)
GFR SERPL CREATININE-BSD FRML MDRD: 113 ML/MIN/1.73SQ M
GLUCOSE P FAST SERPL-MCNC: 93 MG/DL (ref 65–99)
GLUCOSE UR STRIP-MCNC: NEGATIVE MG/DL
HCT VFR BLD AUTO: 38.1 % (ref 34.8–46.1)
HDLC SERPL-MCNC: 57 MG/DL
HGB BLD-MCNC: 12 G/DL (ref 11.5–15.4)
HGB UR QL STRIP.AUTO: NEGATIVE
IMM GRANULOCYTES # BLD AUTO: 0.03 THOUSAND/UL (ref 0–0.2)
IMM GRANULOCYTES NFR BLD AUTO: 0 % (ref 0–2)
IRON SATN MFR SERPL: 10 % (ref 15–50)
IRON SERPL-MCNC: 42 UG/DL (ref 50–170)
KETONES UR STRIP-MCNC: NEGATIVE MG/DL
LDLC SERPL CALC-MCNC: 63 MG/DL (ref 0–100)
LEUKOCYTE ESTERASE UR QL STRIP: ABNORMAL
LYMPHOCYTES # BLD AUTO: 1.93 THOUSANDS/ΜL (ref 0.6–4.47)
LYMPHOCYTES NFR BLD AUTO: 24 % (ref 14–44)
MCH RBC QN AUTO: 25.6 PG (ref 26.8–34.3)
MCHC RBC AUTO-ENTMCNC: 31.5 G/DL (ref 31.4–37.4)
MCV RBC AUTO: 81 FL (ref 82–98)
MONOCYTES # BLD AUTO: 0.41 THOUSAND/ΜL (ref 0.17–1.22)
MONOCYTES NFR BLD AUTO: 5 % (ref 4–12)
MUCOUS THREADS UR QL AUTO: ABNORMAL
NEUTROPHILS # BLD AUTO: 5.42 THOUSANDS/ΜL (ref 1.85–7.62)
NEUTS SEG NFR BLD AUTO: 67 % (ref 43–75)
NITRITE UR QL STRIP: NEGATIVE
NON-SQ EPI CELLS URNS QL MICRO: ABNORMAL /HPF
NONHDLC SERPL-MCNC: 74 MG/DL
NRBC BLD AUTO-RTO: 0 /100 WBCS
PH UR STRIP.AUTO: 6 [PH]
PLATELET # BLD AUTO: 320 THOUSANDS/UL (ref 149–390)
PMV BLD AUTO: 10.3 FL (ref 8.9–12.7)
POTASSIUM SERPL-SCNC: 3.8 MMOL/L (ref 3.5–5.3)
PROT SERPL-MCNC: 7 G/DL (ref 6.4–8.4)
PROT UR STRIP-MCNC: NEGATIVE MG/DL
RBC # BLD AUTO: 4.68 MILLION/UL (ref 3.81–5.12)
RBC #/AREA URNS AUTO: ABNORMAL /HPF
SODIUM SERPL-SCNC: 137 MMOL/L (ref 135–147)
SP GR UR STRIP.AUTO: 1.02 (ref 1–1.03)
TIBC SERPL-MCNC: 403 UG/DL (ref 250–450)
TRIGL SERPL-MCNC: 53 MG/DL
UROBILINOGEN UR QL STRIP.AUTO: 0.2 E.U./DL
WBC # BLD AUTO: 8.13 THOUSAND/UL (ref 4.31–10.16)
WBC #/AREA URNS AUTO: ABNORMAL /HPF

## 2022-05-15 PROCEDURE — 83550 IRON BINDING TEST: CPT

## 2022-05-15 PROCEDURE — 86430 RHEUMATOID FACTOR TEST QUAL: CPT

## 2022-05-15 PROCEDURE — 82728 ASSAY OF FERRITIN: CPT

## 2022-05-15 PROCEDURE — 36415 COLL VENOUS BLD VENIPUNCTURE: CPT

## 2022-05-15 PROCEDURE — 85025 COMPLETE CBC W/AUTO DIFF WBC: CPT

## 2022-05-15 PROCEDURE — 82550 ASSAY OF CK (CPK): CPT

## 2022-05-15 PROCEDURE — 81001 URINALYSIS AUTO W/SCOPE: CPT | Performed by: FAMILY MEDICINE

## 2022-05-15 PROCEDURE — 86618 LYME DISEASE ANTIBODY: CPT

## 2022-05-15 PROCEDURE — 80061 LIPID PANEL: CPT

## 2022-05-15 PROCEDURE — 86038 ANTINUCLEAR ANTIBODIES: CPT

## 2022-05-15 PROCEDURE — 80053 COMPREHEN METABOLIC PANEL: CPT

## 2022-05-15 PROCEDURE — 83540 ASSAY OF IRON: CPT

## 2022-05-15 PROCEDURE — 83036 HEMOGLOBIN GLYCOSYLATED A1C: CPT

## 2022-05-15 PROCEDURE — 85652 RBC SED RATE AUTOMATED: CPT

## 2022-05-16 LAB
ANA TITR SER IF: NEGATIVE {TITER}
RHEUMATOID FACT SER QL LA: NEGATIVE

## 2022-05-17 DIAGNOSIS — M25.50 ARTHRALGIA, UNSPECIFIED JOINT: Primary | ICD-10-CM

## 2022-05-17 DIAGNOSIS — E61.1 LOW IRON: Primary | ICD-10-CM

## 2022-05-17 DIAGNOSIS — N39.0 URINARY TRACT INFECTION WITHOUT HEMATURIA, SITE UNSPECIFIED: Primary | ICD-10-CM

## 2022-05-17 DIAGNOSIS — M25.50 PAIN IN JOINT, MULTIPLE SITES: Primary | ICD-10-CM

## 2022-05-17 DIAGNOSIS — R73.9 ELEVATED BLOOD SUGAR: Primary | ICD-10-CM

## 2022-05-17 LAB
B BURGDOR IGG+IGM SER-ACNC: 57
EST. AVERAGE GLUCOSE BLD GHB EST-MCNC: 108 MG/DL
HBA1C MFR BLD: 5.4 %

## 2022-05-17 RX ORDER — NITROFURANTOIN 25; 75 MG/1; MG/1
100 CAPSULE ORAL 2 TIMES DAILY
Qty: 14 CAPSULE | Refills: 0 | Status: SHIPPED | OUTPATIENT
Start: 2022-05-17 | End: 2022-05-24

## 2022-06-17 DIAGNOSIS — K21.9 GASTROESOPHAGEAL REFLUX DISEASE, UNSPECIFIED WHETHER ESOPHAGITIS PRESENT: ICD-10-CM

## 2022-06-17 RX ORDER — PANTOPRAZOLE SODIUM 40 MG/1
40 TABLET, DELAYED RELEASE ORAL DAILY
Qty: 90 TABLET | Refills: 0 | Status: SHIPPED | OUTPATIENT
Start: 2022-06-17

## 2022-07-26 ENCOUNTER — TELEPHONE (OUTPATIENT)
Dept: GASTROENTEROLOGY | Facility: CLINIC | Age: 52
End: 2022-07-26

## 2022-09-13 ENCOUNTER — HOSPITAL ENCOUNTER (OUTPATIENT)
Dept: NON INVASIVE DIAGNOSTICS | Facility: CLINIC | Age: 52
Discharge: HOME | End: 2022-09-13
Payer: COMMERCIAL

## 2022-09-13 DIAGNOSIS — R11.0 NAUSEA: ICD-10-CM

## 2022-09-13 DIAGNOSIS — R14.0 BLOATING: ICD-10-CM

## 2022-09-13 PROCEDURE — A9541 TC99M SULFUR COLLOID: HCPCS

## 2022-09-13 PROCEDURE — G1004 CDSM NDSC: HCPCS

## 2022-09-13 PROCEDURE — 78264 GASTRIC EMPTYING IMG STUDY: CPT

## 2022-09-15 ENCOUNTER — OFFICE VISIT (OUTPATIENT)
Dept: OBGYN CLINIC | Facility: CLINIC | Age: 52
End: 2022-09-15

## 2022-09-15 VITALS
DIASTOLIC BLOOD PRESSURE: 87 MMHG | SYSTOLIC BLOOD PRESSURE: 129 MMHG | HEART RATE: 97 BPM | BODY MASS INDEX: 35.73 KG/M2 | WEIGHT: 182 LBS | HEIGHT: 60 IN

## 2022-09-15 DIAGNOSIS — D25.9 UTERINE LEIOMYOMA, UNSPECIFIED LOCATION: ICD-10-CM

## 2022-09-15 DIAGNOSIS — N93.9 ABNORMAL UTERINE BLEEDING (AUB): Primary | ICD-10-CM

## 2022-09-15 PROCEDURE — 99213 OFFICE O/P EST LOW 20 MIN: CPT | Performed by: OBSTETRICS & GYNECOLOGY

## 2022-09-15 NOTE — PROGRESS NOTES
Assessment/Plan:    No problem-specific Assessment & Plan notes found for this encounter  Diagnoses and all orders for this visit:    Abnormal uterine bleeding (AUB)  -     US pelvis complete w transvaginal; Future    Uterine leiomyoma, unspecified location          Subjective:      Patient ID: Rhonda Borjas is a 46 y o  female  The patient is a 49-year-old who presents with increasingly severe irregular vaginal bleeding and pelvic pain  She has had severe anemia and required transfusions and Venofer treatments  The bleeding is occasionally heavy but almost constant  She had a normal endometrial biopsy less than 1 year ago which showed proliferative phase (weakly proliferative)  She has not however had an ultrasound of the pelvis for over a year and half  She was previously consented for hysterectomy but was temporarily lost follow-up  She is now interested in rescheduling the procedure  We will start with a repeat ultrasound to look for changes in the endometrium or growth of the fibroids  The procedure was we discussed in general terms  We will review the procedure in more detail when she returns following her ultrasound  49-year-old here with irregular bleeding  History of menorrhea x3 years, has heavy bleeding every 2-3 weeks, history of multiple fibroids 5-6 cm in size  Patient was last seen 10/21/2021 for AUB  She had any EMB done 10/07/2021 that showed weakly proliferative endometrium negative for hyperplasia and malignancy with evidence of chronic endometritis  History of enter for infusions due to her heavy bleeding and anemia    Has used Depo in the past, patient was interested in hysterectomy and was consented    Last Pap was 10/07/2021 done with her EMB, her Pap was negative negative high-risk HPV    The following portions of the patient's history were reviewed and updated as appropriate: allergies, current medications, past family history, past medical history, past social history, past surgical history and problem list     Review of Systems   Constitutional: Negative for chills, diaphoresis, fatigue, fever and unexpected weight change  HENT: Negative for congestion, sinus pressure, sinus pain, tinnitus and trouble swallowing  Eyes: Negative for visual disturbance  Respiratory: Negative for cough, chest tightness and shortness of breath  Cardiovascular: Negative for chest pain, palpitations and leg swelling  Gastrointestinal: Negative for abdominal distention, abdominal pain, anal bleeding, constipation, diarrhea, nausea, rectal pain and vomiting  Endocrine: Negative for heat intolerance  Genitourinary: Negative for difficulty urinating, dysuria, flank pain, frequency, genital sores, hematuria and urgency  Musculoskeletal: Negative for arthralgias, back pain and joint swelling  Skin: Negative for rash  Allergic/Immunologic: Negative for environmental allergies and food allergies  Neurological: Negative for headaches  Hematological: Negative for adenopathy  Does not bruise/bleed easily  Psychiatric/Behavioral: Negative for decreased concentration and dysphoric mood  The patient is not nervous/anxious  Objective: There were no vitals taken for this visit  Physical Exam  Vitals and nursing note reviewed  Exam conducted with a chaperone present  Constitutional:       Appearance: Normal appearance  She is normal weight  HENT:      Head: Normocephalic and atraumatic  Nose: Nose normal    Eyes:      Conjunctiva/sclera: Conjunctivae normal    Pulmonary:      Effort: Pulmonary effort is normal    Abdominal:      General: Abdomen is flat  Palpations: Abdomen is soft  Musculoskeletal:         General: Normal range of motion  Skin:     General: Skin is warm and dry  Neurological:      General: No focal deficit present  Mental Status: She is alert  Mental status is at baseline     Psychiatric:         Mood and Affect: Mood normal          Behavior: Behavior normal          Thought Content:  Thought content normal          Judgment: Judgment normal

## 2022-09-20 ENCOUNTER — TELEPHONE (OUTPATIENT)
Dept: GASTROENTEROLOGY | Facility: CLINIC | Age: 52
End: 2022-09-20

## 2022-09-20 ENCOUNTER — OFFICE VISIT (OUTPATIENT)
Dept: GASTROENTEROLOGY | Facility: CLINIC | Age: 52
End: 2022-09-20
Payer: COMMERCIAL

## 2022-09-20 VITALS
HEIGHT: 62 IN | BODY MASS INDEX: 34.78 KG/M2 | DIASTOLIC BLOOD PRESSURE: 92 MMHG | WEIGHT: 189 LBS | SYSTOLIC BLOOD PRESSURE: 132 MMHG | HEART RATE: 90 BPM

## 2022-09-20 DIAGNOSIS — R14.0 BLOATING: ICD-10-CM

## 2022-09-20 DIAGNOSIS — K21.9 GASTROESOPHAGEAL REFLUX DISEASE WITHOUT ESOPHAGITIS: Primary | ICD-10-CM

## 2022-09-20 PROCEDURE — 99214 OFFICE O/P EST MOD 30 MIN: CPT | Performed by: INTERNAL MEDICINE

## 2022-09-20 RX ORDER — FAMOTIDINE 40 MG/1
40 TABLET, FILM COATED ORAL
Qty: 30 TABLET | Refills: 2 | Status: SHIPPED | OUTPATIENT
Start: 2022-09-20 | End: 2022-10-21

## 2022-09-20 NOTE — TELEPHONE ENCOUNTER
I called and left a message asking patient if she was able to come in at 140p instead of 440p for her appt today with Dr Jacob Ceballos

## 2022-09-20 NOTE — PROGRESS NOTES
[facility] - Outpatient Follow-up Note  Julieta Gonsalez 46 y o  female MRN: [id number]  Encounter: [id number]          ASSESSMENT AND PLAN:      1  Gastroesophageal reflux disease without esophagitis  Status post EGD which shows small gastric submucosal nodule, subsequently EUS in a m  EMR was done, biopsy result was reviewed with patient  She is taking pantoprazole 40 mg p o  q a m  and still having bloating in the evening and in the nighttime, will add famotidine 40 mg p o  q h s   Long discussion with the patient regarding small meal, avoid late dinner and weight loss, advised her to cut down on fatty food and too much carbohydrate  - famotidine (PEPCID) 40 MG tablet; Take 1 tablet (40 mg total) by mouth daily at bedtime  Dispense: 30 tablet; Refill: 2    2  Bloating  Gastric emptying study came back normal, bloating most likely related to functional dyspepsia  - famotidine (PEPCID) 40 MG tablet; Take 1 tablet (40 mg total) by mouth daily at bedtime  Dispense: 30 tablet; Refill: 2    3  Up-to-date with screening colonoscopy    4  Uterine fibroid-she is following with gynecologist, plan for hysterectomy as per patient    ______________________________________________________________________    SUBJECTIVE:  Patient seen and examined, she come for follow-up  She is doing well with the reflux symptoms, pantoprazole is helping her but she is still having nocturnal bloating and postprandial bloating at nighttime  She denies any regurgitation, she denies any nausea or vomiting, she had a recent gastric emptying study which came back normal ultrasound was done which shows uterine fibroid, she is following with gynecologist, she had a biopsy done and plan is to possible hysterectomy  She denies any chronic constipation, she is up-to-date with screening colonoscopy      REVIEW OF SYSTEMS IS OTHERWISE NEGATIVE        Historical Information   Past Medical History:   Diagnosis Date    Abdominal pain     Anemia     Arthritis     Colon polyp     GERD (gastroesophageal reflux disease)     GI disease     History of Helicobacter pylori infection     Iron deficiency     Iron deficiency anemia     Pneumonia      Past Surgical History:   Procedure Laterality Date    CHOLECYSTECTOMY      COLONOSCOPY      MAMMO (HISTORICAL)  2018    TUBAL LIGATION      UPPER GASTROINTESTINAL ENDOSCOPY       Social History   Social History     Substance and Sexual Activity   Alcohol Use No     Social History     Substance and Sexual Activity   Drug Use No     Social History     Tobacco Use   Smoking Status Never Smoker   Smokeless Tobacco Never Used     Family History   Problem Relation Age of Onset    Hyperlipidemia Mother     Hypertension Mother     Kidney disease Mother     No Known Problems Father     Kidney disease Sister        Meds/Allergies       Current Outpatient Medications:     famotidine (PEPCID) 40 MG tablet    ferrous sulfate 325 (65 Fe) mg tablet    pantoprazole (PROTONIX) 40 mg tablet    ascorbic acid (VITAMIN C) 500 MG tablet    ferrous sulfate 220 (44 Fe) mg/5 mL solution    fluticasone (FLONASE) 50 mcg/act nasal spray    linaCLOtide (Linzess) 290 MCG CAPS    medroxyPROGESTERone (PROVERA) 10 mg tablet    megestrol (MEGACE) 40 mg tablet    meloxicam (MOBIC) 15 mg tablet    metroNIDAZOLE (FLAGYL) 500 mg tablet    triamcinolone (KENALOG) 0 025 % cream    Allergies   Allergen Reactions    Aspirin Palpitations           Objective     Blood pressure 132/92, pulse 90, height 5' 2" (1 575 m), weight 85 7 kg (189 lb), not currently breastfeeding  Body mass index is 34 57 kg/m²        PHYSICAL EXAM:      General Appearance:   Alert, cooperative, no distress   HEENT:   Normocephalic, atraumatic, anicteric      Neck:  Supple, symmetrical, trachea midline   Lungs:   Clear to auscultation bilaterally; no rales, rhonchi or wheezing; respirations unlabored    Heart[de-identified]   Regular rate and rhythm; no murmur, rub, or gallop  Abdomen:   Soft, non-tender, non-distended; normal bowel sounds; no masses, no organomegaly    Genitalia:   Deferred    Rectal:   Deferred    Extremities:  No cyanosis, clubbing or edema    Pulses:  2+ and symmetric    Skin:  No jaundice, rashes, or lesions    Lymph nodes:  No palpable cervical lymphadenopathy        Lab Results:   No visits with results within 1 Day(s) from this visit     Latest known visit with results is:   Appointment on 05/15/2022   Component Date Value    WBC 05/15/2022 8 13     RBC 05/15/2022 4 68     Hemoglobin 05/15/2022 12 0     Hematocrit 05/15/2022 38 1     MCV 05/15/2022 81 (A)    MCH 05/15/2022 25 6 (A)    MCHC 05/15/2022 31 5     RDW 05/15/2022 17 0 (A)    MPV 05/15/2022 10 3     Platelets 55/77/2167 320     nRBC 05/15/2022 0     Neutrophils Relative 05/15/2022 67     Immat GRANS % 05/15/2022 0     Lymphocytes Relative 05/15/2022 24     Monocytes Relative 05/15/2022 5     Eosinophils Relative 05/15/2022 3     Basophils Relative 05/15/2022 1     Neutrophils Absolute 05/15/2022 5 42     Immature Grans Absolute 05/15/2022 0 03     Lymphocytes Absolute 05/15/2022 1 93     Monocytes Absolute 05/15/2022 0 41     Eosinophils Absolute 05/15/2022 0 28     Basophils Absolute 05/15/2022 0 06     Sodium 05/15/2022 137     Potassium 05/15/2022 3 8     Chloride 05/15/2022 108     CO2 05/15/2022 24     ANION GAP 05/15/2022 5     BUN 05/15/2022 14     Creatinine 05/15/2022 0 48 (A)    Glucose, Fasting 05/15/2022 93     Calcium 05/15/2022 8 3 (A)    AST 05/15/2022 10 (A)    ALT 05/15/2022 10     Alkaline Phosphatase 05/15/2022 62     Total Protein 05/15/2022 7 0     Albumin 05/15/2022 3 9     Total Bilirubin 05/15/2022 0 35     eGFR 05/15/2022 113     Cholesterol 05/15/2022 131     Triglycerides 05/15/2022 53     HDL, Direct 05/15/2022 57     LDL Calculated 05/15/2022 63     Non-HDL-Chol (CHOL-HDL) 05/15/2022 74     Rheumatoid Factor 05/15/2022 Negative     Lyme total antibody 05/15/2022 57     Sed Rate 05/15/2022 27     Antinuclear Antibodies, * 05/15/2022 Negative     Total CK 05/15/2022 76     Iron Saturation 05/15/2022 10 (A)    TIBC 05/15/2022 403     Iron 05/15/2022 42 (A)    Ferritin 05/15/2022 11     Hemoglobin A1C 05/15/2022 5 4     EAG 05/15/2022 108          Radiology Results:   NM gastric emptying    Result Date: 9/13/2022  Narrative: GASTRIC EMPTYING STUDY INDICATION: R14 0: Abdominal distension (gaseous) R11 0: Nausea COMPARISON:  None available TECHNIQUE:   The study was performed following the oral administration of 1 1 mCi Tc-99m sulfur colloid combined with scrambled eggs, as part of a standard meal   Following the meal, one minute anterior and posterior images were obtained immediately and at 0 25 hours, 0 5 hour, 1 hour, 1 5 hour, 2 hour, 3 hour and 4 hour intervals from the time of ingestion  Geometric mean analyses were then performed  FINDINGS: Gastric emptying at 0 5 hours = 13 (N < 30%) Gastric emptying at 1 hour = 24 % (N = 10 - 70%) Gastric emptying at 2 hours = 49 % (N = > 40%) Gastric emptying at 3 hours = 73 % (N = > 70%) Gastric emptying at 4 hours = 97 % (N = >90%) Linear T-1/2 = 124 minutes     Impression: Normal rate of gastric emptying   Workstation performed: [id number]

## 2022-09-23 ENCOUNTER — HOSPITAL ENCOUNTER (OUTPATIENT)
Dept: ULTRASOUND IMAGING | Facility: HOSPITAL | Age: 52
End: 2022-09-23
Attending: OBSTETRICS & GYNECOLOGY
Payer: COMMERCIAL

## 2022-09-23 DIAGNOSIS — N93.9 ABNORMAL UTERINE BLEEDING (AUB): ICD-10-CM

## 2022-09-23 PROCEDURE — 76856 US EXAM PELVIC COMPLETE: CPT

## 2022-09-23 PROCEDURE — 76830 TRANSVAGINAL US NON-OB: CPT

## 2022-09-28 DIAGNOSIS — K21.9 GASTROESOPHAGEAL REFLUX DISEASE, UNSPECIFIED WHETHER ESOPHAGITIS PRESENT: ICD-10-CM

## 2022-09-29 RX ORDER — PANTOPRAZOLE SODIUM 40 MG/1
TABLET, DELAYED RELEASE ORAL
Qty: 90 TABLET | Refills: 0 | Status: SHIPPED | OUTPATIENT
Start: 2022-09-29

## 2022-09-30 ENCOUNTER — OFFICE VISIT (OUTPATIENT)
Dept: FAMILY MEDICINE CLINIC | Facility: CLINIC | Age: 52
End: 2022-09-30
Payer: COMMERCIAL

## 2022-09-30 VITALS
SYSTOLIC BLOOD PRESSURE: 120 MMHG | HEIGHT: 62 IN | BODY MASS INDEX: 34.78 KG/M2 | TEMPERATURE: 97.4 F | OXYGEN SATURATION: 98 % | DIASTOLIC BLOOD PRESSURE: 84 MMHG | HEART RATE: 77 BPM | RESPIRATION RATE: 16 BRPM | WEIGHT: 189 LBS

## 2022-09-30 DIAGNOSIS — L23.7 POISON IVY DERMATITIS: Primary | ICD-10-CM

## 2022-09-30 DIAGNOSIS — D50.0 IRON DEFICIENCY ANEMIA DUE TO CHRONIC BLOOD LOSS: ICD-10-CM

## 2022-09-30 DIAGNOSIS — E83.51 HYPOCALCEMIA: ICD-10-CM

## 2022-09-30 DIAGNOSIS — Z12.31 ENCOUNTER FOR SCREENING MAMMOGRAM FOR MALIGNANT NEOPLASM OF BREAST: ICD-10-CM

## 2022-09-30 PROCEDURE — 3725F SCREEN DEPRESSION PERFORMED: CPT | Performed by: FAMILY MEDICINE

## 2022-09-30 PROCEDURE — 99214 OFFICE O/P EST MOD 30 MIN: CPT | Performed by: FAMILY MEDICINE

## 2022-09-30 RX ORDER — PREDNISONE 10 MG/1
TABLET ORAL
Qty: 40 TABLET | Refills: 0 | Status: SHIPPED | OUTPATIENT
Start: 2022-09-30

## 2022-09-30 RX ORDER — TRIAMCINOLONE ACETONIDE 5 MG/G
CREAM TOPICAL 2 TIMES DAILY
Qty: 30 G | Refills: 1 | Status: SHIPPED | OUTPATIENT
Start: 2022-09-30

## 2022-09-30 NOTE — PROGRESS NOTES
Name: Adiel Castellanos      : 1970      MRN: [id number]  Encounter Provider: Ashok Tracey MD  Encounter Date: 2022   Encounter department: [address] [place] [address] MEDICINE    Assessment & Plan     1  Poison ivy dermatitis  Assessment & Plan: Will give prednisone taper triamcinolone  And pt already on  famotidine     Orders:  -     predniSONE 10 mg tablet; 40mg po qd x 4 days ;30mg po qd x 4 days;20mg po qd x 4 days ;10mg po qd x 4 days  -     triamcinolone (KENALOG) 0 5 % cream; Apply topically 2 (two) times a day    2  Encounter for screening mammogram for malignant neoplasm of breast  -     Mammo screening bilateral w 3d & cad; Future; Expected date: 2022    3  Iron deficiency anemia due to chronic blood loss  Assessment & Plan:  Still has heavy periods due to fibroids will   Recheck cbc and   iron      Orders:  -     CBC and differential; Future  -     Iron Panel (Includes Ferritin, Iron Sat%, Iron, and TIBC); Future    4  Hypocalcemia  Assessment & Plan:  Check ionized calcium    Orders:  -     Calcium, ionized; Future  -     Comprehensive metabolic panel; Future           Subjective      HPI poison ivy  Now spreading very itchjy   Review of Systems   Skin: Positive for rash (arms chest neck genital area)         Current Outpatient Medications on File Prior to Visit   Medication Sig    ascorbic acid (VITAMIN C) 500 MG tablet Take 500 mg by mouth daily    famotidine (PEPCID) 40 MG tablet Take 1 tablet (40 mg total) by mouth daily at bedtime    ferrous sulfate 325 (65 Fe) mg tablet Take 325 mg by mouth daily with breakfast    pantoprazole (PROTONIX) 40 mg tablet TAKE 1 TABLET BY MOUTH ONCE DAILY, TAKE 30 MINUTES BEFORE BREAKFAST    ferrous sulfate 220 (44 Fe) mg/5 mL solution Take by mouth daily (Patient not taking: No sig reported)    fluticasone (FLONASE) 50 mcg/act nasal spray 2 sprays into each nostril daily (Patient not taking: No sig reported)    linaCLOtide (Linzess) 290 MCG CAPS Take 1 capsule by mouth in the morning (Patient not taking: No sig reported)    medroxyPROGESTERone (PROVERA) 10 mg tablet Take 1 tablet (10 mg total) by mouth daily (Patient not taking: No sig reported)    megestrol (MEGACE) 40 mg tablet  (Patient not taking: No sig reported)    meloxicam (MOBIC) 15 mg tablet Take 15 mg by mouth daily (Patient not taking: No sig reported)    metroNIDAZOLE (FLAGYL) 500 mg tablet Take 500 mg by mouth Three times a day  (Patient not taking: No sig reported)    [DISCONTINUED] triamcinolone (KENALOG) 0 025 % cream Apply topically 2 (two) times a day (Patient not taking: No sig reported)       Objective     /84 (BP Location: Left arm, Patient Position: Sitting, Cuff Size: Large)   Pulse 77   Temp (!) 97 4 °F (36 3 °C) (Temporal)   Resp 16   Ht 5' 2" (1 575 m)   Wt 85 7 kg (189 lb)   SpO2 98%   BMI 34 57 kg/m²     Physical Exam  Skin:     Findings: Rash (erythema with vesicles linear rash arms chest neck) present         Beatriz Valdez MD

## 2022-10-10 ENCOUNTER — RA CDI HCC (OUTPATIENT)
Dept: OTHER | Facility: HOSPITAL | Age: 52
End: 2022-10-10

## 2022-10-10 NOTE — PROGRESS NOTES
[facility]  coding opportunities       Chart reviewed, no opportunity found: CHART REVIEWED, NO OPPORTUNITY FOUND        Patients Insurance        Commercial Insurance: [employer]

## 2022-10-12 PROBLEM — Z11.59 NEED FOR HEPATITIS C SCREENING TEST: Status: RESOLVED | Noted: 2021-08-19 | Resolved: 2022-10-12

## 2022-10-16 ENCOUNTER — APPOINTMENT (OUTPATIENT)
Dept: LAB | Facility: CLINIC | Age: 52
End: 2022-10-16
Payer: COMMERCIAL

## 2022-10-16 DIAGNOSIS — D50.0 IRON DEFICIENCY ANEMIA DUE TO CHRONIC BLOOD LOSS: ICD-10-CM

## 2022-10-16 DIAGNOSIS — E83.51 HYPOCALCEMIA: ICD-10-CM

## 2022-10-16 DIAGNOSIS — M25.50 PAIN IN JOINT, MULTIPLE SITES: ICD-10-CM

## 2022-10-16 LAB
ALBUMIN SERPL BCP-MCNC: 4.1 G/DL (ref 3.5–5)
ALP SERPL-CCNC: 78 U/L (ref 34–104)
ALT SERPL W P-5'-P-CCNC: 18 U/L (ref 7–52)
ANION GAP SERPL CALCULATED.3IONS-SCNC: 6 MMOL/L (ref 4–13)
AST SERPL W P-5'-P-CCNC: 16 U/L (ref 13–39)
BASOPHILS # BLD AUTO: 0.07 THOUSANDS/ΜL (ref 0–0.1)
BASOPHILS NFR BLD AUTO: 1 % (ref 0–1)
BILIRUB SERPL-MCNC: 0.48 MG/DL (ref 0.2–1)
BUN SERPL-MCNC: 11 MG/DL (ref 5–25)
CA-I BLD-SCNC: 1.15 MMOL/L (ref 1.12–1.32)
CALCIUM SERPL-MCNC: 8.9 MG/DL (ref 8.4–10.2)
CHLORIDE SERPL-SCNC: 108 MMOL/L (ref 96–108)
CO2 SERPL-SCNC: 26 MMOL/L (ref 21–32)
CREAT SERPL-MCNC: 0.53 MG/DL (ref 0.6–1.3)
EOSINOPHIL # BLD AUTO: 0.23 THOUSAND/ΜL (ref 0–0.61)
EOSINOPHIL NFR BLD AUTO: 3 % (ref 0–6)
ERYTHROCYTE [DISTWIDTH] IN BLOOD BY AUTOMATED COUNT: 15 % (ref 11.6–15.1)
FERRITIN SERPL-MCNC: 20 NG/ML (ref 8–388)
GFR SERPL CREATININE-BSD FRML MDRD: 109 ML/MIN/1.73SQ M
GLUCOSE P FAST SERPL-MCNC: 99 MG/DL (ref 65–99)
HCT VFR BLD AUTO: 38.6 % (ref 34.8–46.1)
HGB BLD-MCNC: 12.7 G/DL (ref 11.5–15.4)
IMM GRANULOCYTES # BLD AUTO: 0.02 THOUSAND/UL (ref 0–0.2)
IMM GRANULOCYTES NFR BLD AUTO: 0 % (ref 0–2)
IRON SATN MFR SERPL: 9 % (ref 15–50)
IRON SERPL-MCNC: 36 UG/DL (ref 50–170)
LYMPHOCYTES # BLD AUTO: 1.91 THOUSANDS/ΜL (ref 0.6–4.47)
LYMPHOCYTES NFR BLD AUTO: 25 % (ref 14–44)
MCH RBC QN AUTO: 27.1 PG (ref 26.8–34.3)
MCHC RBC AUTO-ENTMCNC: 32.9 G/DL (ref 31.4–37.4)
MCV RBC AUTO: 82 FL (ref 82–98)
MONOCYTES # BLD AUTO: 0.38 THOUSAND/ΜL (ref 0.17–1.22)
MONOCYTES NFR BLD AUTO: 5 % (ref 4–12)
NEUTROPHILS # BLD AUTO: 5.15 THOUSANDS/ΜL (ref 1.85–7.62)
NEUTS SEG NFR BLD AUTO: 66 % (ref 43–75)
NRBC BLD AUTO-RTO: 0 /100 WBCS
PLATELET # BLD AUTO: 362 THOUSANDS/UL (ref 149–390)
PMV BLD AUTO: 9.9 FL (ref 8.9–12.7)
POTASSIUM SERPL-SCNC: 4 MMOL/L (ref 3.5–5.3)
PROT SERPL-MCNC: 7.3 G/DL (ref 6.4–8.4)
RBC # BLD AUTO: 4.69 MILLION/UL (ref 3.81–5.12)
SODIUM SERPL-SCNC: 140 MMOL/L (ref 135–147)
TIBC SERPL-MCNC: 408 UG/DL (ref 250–450)
WBC # BLD AUTO: 7.76 THOUSAND/UL (ref 4.31–10.16)

## 2022-10-16 PROCEDURE — 80053 COMPREHEN METABOLIC PANEL: CPT

## 2022-10-16 PROCEDURE — 85025 COMPLETE CBC W/AUTO DIFF WBC: CPT

## 2022-10-16 PROCEDURE — 36415 COLL VENOUS BLD VENIPUNCTURE: CPT

## 2022-10-16 PROCEDURE — 82330 ASSAY OF CALCIUM: CPT

## 2022-10-16 PROCEDURE — 82728 ASSAY OF FERRITIN: CPT

## 2022-10-16 PROCEDURE — 83550 IRON BINDING TEST: CPT

## 2022-10-16 PROCEDURE — 83540 ASSAY OF IRON: CPT

## 2022-10-19 DIAGNOSIS — E61.1 LOW IRON: Primary | ICD-10-CM

## 2022-10-19 NOTE — PROGRESS NOTES
Subjective    Maranda Fabian is a 46year old who presents with increasingly severe vaginal bleeding and pelvic pain  A previous endometrial biopsy less than 1 year ago was normal  She had been scheduled for a hysterectomy but lost to follow up  A recent pelvic ultrasoudn showed a 12 5x8 4x7 5 fibroid uterus, as well as an echogenic lesion without vascularity in the fundal endometrium  She presents today for an endometrial biopsy  She also reports some yellow vaginal discharge for several weeks  OB History    No obstetric history on file  Objective    Vitals:    10/20/22 0930   BP: 118/78   BP Location: Right arm   Patient Position: Sitting   Cuff Size: Adult   Pulse: 80   Weight: 83 kg (183 lb)   Height: 5' 2" (1 575 m)        Physical Exam  Constitutional:       Appearance: Normal appearance  Genitourinary:      Vulva normal       Genitourinary Comments: Large multiparous cervix, minimal beige discharge; KOH/wet mount negative   HENT:      Head: Normocephalic and atraumatic  Cardiovascular:      Rate and Rhythm: Normal rate  Pulmonary:      Effort: Pulmonary effort is normal  No respiratory distress  Abdominal:      Palpations: Abdomen is soft  Tenderness: There is no abdominal tenderness  Musculoskeletal:         General: Normal range of motion  Neurological:      Mental Status: She is alert  Skin:     General: Skin is warm and dry            Assessment    Patient Active Problem List   Diagnosis   • Tension-type headache, not intractable   • GERD (gastroesophageal reflux disease)   • Anemia   • History of colon polyps   • Chronic fatigue   • BMI 33 0-33 9,adult   • Annual physical exam   • Pain in joint, multiple sites   • Myalgia   • Fatty liver   • Poison ivy dermatitis   • Hypocalcemia        Plan  - EMB today  - Results visit in 2 weeks  - Normal vaginal discharge

## 2022-10-20 ENCOUNTER — PROCEDURE VISIT (OUTPATIENT)
Dept: OBGYN CLINIC | Facility: CLINIC | Age: 52
End: 2022-10-20

## 2022-10-20 VITALS
HEART RATE: 80 BPM | DIASTOLIC BLOOD PRESSURE: 78 MMHG | BODY MASS INDEX: 33.68 KG/M2 | HEIGHT: 62 IN | SYSTOLIC BLOOD PRESSURE: 118 MMHG | WEIGHT: 183 LBS

## 2022-10-20 DIAGNOSIS — N93.9 ABNORMAL UTERINE BLEEDING (AUB): Primary | ICD-10-CM

## 2022-10-20 LAB — SL AMB POCT URINE HCG: NEGATIVE

## 2022-10-20 PROCEDURE — 58100 BIOPSY OF UTERUS LINING: CPT | Performed by: OBSTETRICS & GYNECOLOGY

## 2022-10-20 PROCEDURE — 99213 OFFICE O/P EST LOW 20 MIN: CPT | Performed by: OBSTETRICS & GYNECOLOGY

## 2022-10-20 PROCEDURE — 81025 URINE PREGNANCY TEST: CPT | Performed by: OBSTETRICS & GYNECOLOGY

## 2022-10-20 NOTE — PROGRESS NOTES
Endometrial biopsy    Date/Time: 10/20/2022 9:59 AM  Performed by: Raymond Rainey MD  Authorized by: Raymond Rainey MD   Universal Protocol:  Procedure performed by: Baron Muller)  Consent: Verbal consent obtained  Written consent obtained  Risks and benefits: risks, benefits and alternatives were discussed  Consent given by: patient  Time out: Immediately prior to procedure a "time out" was called to verify the correct patient, procedure, equipment, support staff and site/side marked as required  Patient understanding: patient states understanding of the procedure being performed  Patient consent: the patient's understanding of the procedure matches consent given  Procedure consent: procedure consent matches procedure scheduled  Required items: required blood products, implants, devices, and special equipment available  Patient identity confirmed: verbally with patient      Indication:     Indications:  Other disorder of menstruation and other abnormal bleeding from female genital tract    Procedure:     Procedure: endometrial biopsy with Pipelle      A bivalve speculum was placed in the vagina: yes      Cervix cleaned and prepped: yes      A paracervical block was performed: no      An intracervical block was performed: no      The cervix was dilated: no      Uterus sounded: yes      Uterus sound depth (cm):  9    Specimen collected: specimen collected and sent to pathology      Patient tolerated procedure well with no complications: yes    Findings:     Uterus size:  9-10 weeks    Cervix: normal

## 2022-10-21 PROBLEM — E61.1 IRON DEFICIENCY: Status: ACTIVE | Noted: 2022-10-21

## 2022-12-19 NOTE — PROGRESS NOTES
Mak[facility]  #[id number] used for this encounter    Reinaldo Angeles is a 45 yo with AUB and pelvic pain  She has required Venofer transfusions in the past for associated anemia  She has used Depo in the past, but desired definitive treatment with a hysterectomy  She was previously consented for a hysterectomy, but was temporarily lost to follow up  Her most recent pelvic ultrasound 9/23/22 demonstrated:     UTERUS:  The uterus is anteverted in position, measuring 12 5 x 8 4 x 7 5 cm  Rounded well-defined nodule(s) present, likely representing myoma(s), as follows: Fibroid 1: 2 8 x 2 3 x 2 1 cm  Intramural location  Lower uterine segment posteriorly No significant change compared to prior  Fibroid 2: 6 3 x 5 2 x 4 7 cm  Intramural location  Posterior uterine body location  No significant change compared to the previous study given variation in measurement technique  The cervix appears within normal limits      ENDOMETRIUM:    The endometrial echo complex has an AP caliber of 13 0 mm  Echogenic lesion without vascularity near the fundal portion of the endometrium measuring 0 7 x 0 3 x 0 8 cm      OVARIES/ADNEXA:  Right ovary:  3 5 x 2 9 x 3 1 cm  16 4 mL  Hypoechoic cyst measuring 2 7 x 2 5 x 2 2 cm  Doppler flow within normal limits      Left ovary:  2 4 x 0 7 x 1 0 cm  0 9 mL  No suspicious left ovarian abnormality  Doppler flow within normal limits      No suspicious adnexal mass or loculated collections  There is small amount of simple free fluid in the pelvis, likely physiologic in this premenopausal female      IMPRESSION:     1  Myomatous uterus  2   Echogenic lesion near the fundal portion of the endometrium with vascularity measuring 0 7 x 0 3 x 0 8 cm  Top considerations would include endometrial polyp or submucosal leiomyoma and a premenopausal patient  If the patient is postmenopausal,   raises concern for neoplasm    3   Hypoechoic cyst in the right ovary measuring 2 7 cm     A subsequent endometrial biopsy was negative for hyperplasia, atypia, or malignancy  Her most recent pap smear in October 2021 was NILM / HPV negative  Her most recent Hgb was 12 7 in October 2022  She states today that she has had minimal bleeding since July  She had a little spotting in September and November, but thinks she is now going through menopause  She also reports new painful intercourse          Objective    Vitals:    12/22/22 1048   BP: 135/85   BP Location: Left arm   Patient Position: Sitting   Cuff Size: Adult   Pulse: 76   Weight: 84 4 kg (186 lb)   Height: 5' 2" (1 575 m)        Physical Exam  Constitutional:       Appearance: Normal appearance  HENT:      Head: Normocephalic and atraumatic  Cardiovascular:      Rate and Rhythm: Normal rate  Pulmonary:      Effort: Pulmonary effort is normal  No respiratory distress  Musculoskeletal:         General: Normal range of motion  Neurological:      Mental Status: She is alert  Skin:     General: Skin is warm and dry            Assessment    Patient Active Problem List   Diagnosis   • Tension-type headache, not intractable   • GERD (gastroesophageal reflux disease)   • Anemia   • History of colon polyps   • Chronic fatigue   • BMI 33 0-33 9,adult   • Annual physical exam   • Pain in joint, multiple sites   • Myalgia   • Fatty liver   • Poison ivy dermatitis   • Hypocalcemia   • Iron deficiency        Plan  - Reviewed endometrial biopsy results  - Discussed that since patient is no longer bleeding and likely in the early stages of menopause, a hysterectomy likely unnecessary  - Reviewed routes, risks, and benefits of hysterectomy  - Since she is no longer bleeding heavily, patient does not want to proceed with a hysterectomy at this point  - Will follow up in 6 months to assess symptoms and bleeding  - Encouraged lubricants and vaginal hydrators for dyspareunia

## 2022-12-22 ENCOUNTER — OFFICE VISIT (OUTPATIENT)
Dept: OBGYN CLINIC | Facility: CLINIC | Age: 52
End: 2022-12-22

## 2022-12-22 VITALS
HEIGHT: 62 IN | DIASTOLIC BLOOD PRESSURE: 85 MMHG | SYSTOLIC BLOOD PRESSURE: 135 MMHG | WEIGHT: 186 LBS | BODY MASS INDEX: 34.23 KG/M2 | HEART RATE: 76 BPM

## 2022-12-22 DIAGNOSIS — N93.9 ABNORMAL UTERINE BLEEDING (AUB): Primary | ICD-10-CM

## 2023-01-08 ENCOUNTER — HOSPITAL ENCOUNTER (OUTPATIENT)
Dept: MAMMOGRAPHY | Facility: HOSPITAL | Age: 53
Discharge: HOME | End: 2023-01-08
Attending: FAMILY MEDICINE

## 2023-01-08 VITALS — WEIGHT: 186 LBS | BODY MASS INDEX: 34.23 KG/M2 | HEIGHT: 62 IN

## 2023-01-08 DIAGNOSIS — Z12.31 ENCOUNTER FOR SCREENING MAMMOGRAM FOR MALIGNANT NEOPLASM OF BREAST: ICD-10-CM

## 2023-02-02 ENCOUNTER — RA CDI HCC (OUTPATIENT)
Dept: OTHER | Facility: HOSPITAL | Age: 53
End: 2023-02-02

## 2023-02-03 ENCOUNTER — HOSPITAL ENCOUNTER (OUTPATIENT)
Dept: ULTRASOUND IMAGING | Facility: CLINIC | Age: 53
Discharge: HOME | End: 2023-02-03

## 2023-02-03 DIAGNOSIS — R92.8 ABNORMAL MAMMOGRAM: ICD-10-CM

## 2023-03-03 ENCOUNTER — OFFICE VISIT (OUTPATIENT)
Dept: FAMILY MEDICINE CLINIC | Facility: CLINIC | Age: 53
End: 2023-03-03

## 2023-03-03 ENCOUNTER — HOSPITAL ENCOUNTER (OUTPATIENT)
Dept: RADIOLOGY | Facility: HOSPITAL | Age: 53
End: 2023-03-03
Attending: FAMILY MEDICINE

## 2023-03-03 VITALS
TEMPERATURE: 96.6 F | HEIGHT: 60 IN | BODY MASS INDEX: 37.11 KG/M2 | SYSTOLIC BLOOD PRESSURE: 120 MMHG | RESPIRATION RATE: 16 BRPM | OXYGEN SATURATION: 98 % | WEIGHT: 189 LBS | HEART RATE: 80 BPM | DIASTOLIC BLOOD PRESSURE: 80 MMHG

## 2023-03-03 DIAGNOSIS — M54.42 CHRONIC BILATERAL LOW BACK PAIN WITH BILATERAL SCIATICA: ICD-10-CM

## 2023-03-03 DIAGNOSIS — M54.41 CHRONIC BILATERAL LOW BACK PAIN WITH BILATERAL SCIATICA: ICD-10-CM

## 2023-03-03 DIAGNOSIS — G89.29 CHRONIC BILATERAL LOW BACK PAIN WITH BILATERAL SCIATICA: ICD-10-CM

## 2023-03-03 DIAGNOSIS — M54.6 CHRONIC MIDLINE THORACIC BACK PAIN: ICD-10-CM

## 2023-03-03 DIAGNOSIS — G89.29 CHRONIC MIDLINE THORACIC BACK PAIN: Primary | ICD-10-CM

## 2023-03-03 DIAGNOSIS — M54.6 CHRONIC MIDLINE THORACIC BACK PAIN: Primary | ICD-10-CM

## 2023-03-03 DIAGNOSIS — G89.29 CHRONIC MIDLINE THORACIC BACK PAIN: ICD-10-CM

## 2023-03-03 RX ORDER — CYCLOBENZAPRINE HCL 10 MG
10 TABLET ORAL
Qty: 10 TABLET | Refills: 0 | Status: SHIPPED | OUTPATIENT
Start: 2023-03-03

## 2023-03-03 NOTE — PROGRESS NOTES
Name: Ely Junior      : 1970      MRN: [id number]  Encounter Provider: Claudia Madden MD  Encounter Date: 3/3/2023   Encounter department: [address] [place] [address] MEDICINE    Assessment & Plan     1  Chronic midline thoracic back pain  Assessment & Plan:  Pt with 6-7 months of pain worsening over 3 months check xray     Orders:  -     XR spine thoracic 3 vw; Future; Expected date: 2023  -     XR spine lumbar minimum 4 views non injury; Future; Expected date: 2023  -     Ambulatory Referral to Orthopedic Surgery; Future  -     Ambulatory Referral to Physical Therapy; Future  -     cyclobenzaprine (FLEXERIL) 10 mg tablet; Take 1 tablet (10 mg total) by mouth daily at bedtime    2  Chronic bilateral low back pain with bilateral sciatica  Assessment & Plan:  Over past 6-7 months  Worsening over 3 months has had some numbness in both legs  None now but feels they are" heavy " Able to urinate but feels the sensation to urinate is less pt does not want to go to ER for evaluation will get xrays and send to  Ortho and start PT any worsening numbness or urination problems to ER    Orders:  -     XR spine thoracic 3 vw; Future; Expected date: 2023  -     XR spine lumbar minimum 4 views non injury; Future; Expected date: 2023  -     Ambulatory Referral to Orthopedic Surgery; Future  -     Ambulatory Referral to Physical Therapy; Future  -     cyclobenzaprine (FLEXERIL) 10 mg tablet; Take 1 tablet (10 mg total) by mouth daily at bedtime           Subjective      HPI pt with 6-7  months of thoracic and  Low back pain pt had no fall or injury  Both  Legs with pain if she stands for a long time  Pt states pain is worse with lyiing down feels her legs are heavy and numb also feels  She has decreased  Sensation to urinate and only goes small amounts even though drinking  A lot of water  Review of Systems   Constitutional: Negative for chills, fatigue and fever     HENT: Negative for congestion, sinus pressure, sinus pain and sore throat  Eyes: Negative for photophobia and visual disturbance  Respiratory: Negative for cough and shortness of breath  Cardiovascular: Negative for chest pain  Gastrointestinal: Negative for nausea and vomiting  Musculoskeletal: Positive for back pain (pain mid thoracic spione to sacrum)  Negative for myalgias, neck pain and neck stiffness  Neurological: Positive for numbness (feels legs are heavy when she gets up from recling and feels some numbness which fresolves when she starts to walk)  Negative for dizziness, weakness, light-headedness and headaches  Psychiatric/Behavioral: Negative for confusion, dysphoric mood and sleep disturbance  The patient is not nervous/anxious          Current Outpatient Medications on File Prior to Visit   Medication Sig   • famotidine (PEPCID) 40 MG tablet Take 1 tablet (40 mg total) by mouth daily at bedtime   • linaCLOtide (Linzess) 290 MCG CAPS Take 1 capsule by mouth in the morning   • ferrous sulfate 220 (44 Fe) mg/5 mL solution Take by mouth daily (Patient not taking: Reported on 4/29/2022)   • metroNIDAZOLE (FLAGYL) 500 mg tablet Take 500 mg by mouth Three times a day  (Patient not taking: Reported on 4/29/2022)   • pantoprazole (PROTONIX) 40 mg tablet TAKE 1 TABLET BY MOUTH ONCE DAILY, TAKE 30 MINUTES BEFORE BREAKFAST (Patient not taking: Reported on 3/3/2023)   • predniSONE 10 mg tablet 40mg po qd x 4 days ;30mg po qd x 4 days;20mg po qd x 4 days ;10mg po qd x 4 days (Patient not taking: Reported on 10/21/2022)   • triamcinolone (KENALOG) 0 5 % cream Apply topically 2 (two) times a day (Patient not taking: Reported on 10/21/2022)   • [DISCONTINUED] ascorbic acid (VITAMIN C) 500 MG tablet Take 500 mg by mouth daily (Patient not taking: Reported on 10/21/2022)   • [DISCONTINUED] ferrous sulfate 325 (65 Fe) mg tablet Take 325 mg by mouth daily with breakfast (Patient not taking: Reported on 3/3/2023)   • [DISCONTINUED] fluticasone (FLONASE) 50 mcg/act nasal spray 2 sprays into each nostril daily (Patient not taking: Reported on 4/29/2022)   • [DISCONTINUED] medroxyPROGESTERone (PROVERA) 10 mg tablet Take 1 tablet (10 mg total) by mouth daily (Patient not taking: Reported on 4/29/2022)   • [DISCONTINUED] megestrol (MEGACE) 40 mg tablet  (Patient not taking: Reported on 4/29/2022)   • [DISCONTINUED] meloxicam (MOBIC) 15 mg tablet Take 15 mg by mouth daily (Patient not taking: Reported on 4/29/2022)       Objective     /80 (BP Location: Left arm, Patient Position: Sitting, Cuff Size: Standard)   Pulse 80   Temp (!) 96 6 °F (35 9 °C) (Tympanic)   Resp 16   Ht 5' (1 524 m)   Wt 85 7 kg (189 lb)   SpO2 98%   BMI 36 91 kg/m²     Physical Exam  Constitutional:       General: She is not in acute distress  Appearance: Normal appearance  She is well-developed  She is obese  She is not ill-appearing  HENT:      Head: Normocephalic and atraumatic  Eyes:      Extraocular Movements: Extraocular movements intact  Pupils: Pupils are equal, round, and reactive to light  Cardiovascular:      Rate and Rhythm: Normal rate and regular rhythm  Pulses: Normal pulses  Heart sounds: Normal heart sounds  No murmur heard  Pulmonary:      Effort: Pulmonary effort is normal  No respiratory distress  Breath sounds: Normal breath sounds  Musculoskeletal:         General: Tenderness (some tenderness spine mid thoracic to sacrum; paraspinal muscle tenderness thoracic to lumbar spine ) present  Cervical back: Normal range of motion and neck supple  Lymphadenopathy:      Cervical: No cervical adenopathy  Neurological:      General: No focal deficit present  Mental Status: She is alert and oriented to person, place, and time  Mental status is at baseline  Cranial Nerves: No cranial nerve deficit  Sensory: No sensory deficit  Motor: No weakness        Coordination: Coordination normal       Gait: Gait normal       Deep Tendon Reflexes: Reflexes are normal and symmetric  Reflexes normal (1+patellar bilaterally)     Psychiatric:         Mood and Affect: Mood normal        Jillian Escobar MD

## 2023-03-03 NOTE — ASSESSMENT & PLAN NOTE
Over past 6-7 months  Worsening over 3 months has had some numbness in both legs  None now but feels they are" heavy " Able to urinate but feels the sensation to urinate is less pt does not want to go to ER for evaluation will get xrays and send to  Ortho and start PT any worsening numbness or urination problems to ER

## 2023-07-09 DIAGNOSIS — M54.41 CHRONIC BILATERAL LOW BACK PAIN WITH BILATERAL SCIATICA: ICD-10-CM

## 2023-07-09 DIAGNOSIS — G89.29 CHRONIC MIDLINE THORACIC BACK PAIN: ICD-10-CM

## 2023-07-09 DIAGNOSIS — M54.42 CHRONIC BILATERAL LOW BACK PAIN WITH BILATERAL SCIATICA: ICD-10-CM

## 2023-07-09 DIAGNOSIS — G89.29 CHRONIC BILATERAL LOW BACK PAIN WITH BILATERAL SCIATICA: ICD-10-CM

## 2023-07-09 DIAGNOSIS — M54.6 CHRONIC MIDLINE THORACIC BACK PAIN: ICD-10-CM

## 2023-07-10 RX ORDER — MELOXICAM 15 MG/1
TABLET ORAL
Qty: 60 TABLET | Refills: 0 | Status: SHIPPED | OUTPATIENT
Start: 2023-07-10

## 2023-07-17 ENCOUNTER — OFFICE VISIT (OUTPATIENT)
Dept: PODIATRY | Facility: CLINIC | Age: 53
End: 2023-07-17
Payer: COMMERCIAL

## 2023-07-17 VITALS — WEIGHT: 189 LBS | HEIGHT: 62 IN | BODY MASS INDEX: 34.78 KG/M2 | RESPIRATION RATE: 16 BRPM

## 2023-07-17 DIAGNOSIS — M54.16 RADICULOPATHY OF LUMBAR REGION: ICD-10-CM

## 2023-07-17 DIAGNOSIS — B35.1 ONYCHOMYCOSIS: ICD-10-CM

## 2023-07-17 DIAGNOSIS — B35.9 DERMATOPHYTOSIS: ICD-10-CM

## 2023-07-17 DIAGNOSIS — M72.2 PLANTAR FASCIITIS: Primary | ICD-10-CM

## 2023-07-17 DIAGNOSIS — M21.962 ACQUIRED DEFORMITY OF LEFT FOOT: ICD-10-CM

## 2023-07-17 DIAGNOSIS — M21.41 ACQUIRED FLAT FOOT, RIGHT: ICD-10-CM

## 2023-07-17 DIAGNOSIS — M21.42 ACQUIRED FLAT FOOT, LEFT: ICD-10-CM

## 2023-07-17 DIAGNOSIS — M21.961 ACQUIRED DEFORMITY OF RIGHT FOOT: ICD-10-CM

## 2023-07-17 PROCEDURE — 99203 OFFICE O/P NEW LOW 30 MIN: CPT | Performed by: PODIATRIST

## 2023-07-17 PROCEDURE — L3000 FT INSERT UCB BERKELEY SHELL: HCPCS | Performed by: PODIATRIST

## 2023-07-17 RX ORDER — METHYLPREDNISOLONE 4 MG/1
TABLET ORAL
Qty: 21 TABLET | Refills: 0 | Status: SHIPPED | OUTPATIENT
Start: 2023-07-17

## 2023-07-17 RX ORDER — GABAPENTIN 100 MG/1
100 CAPSULE ORAL
Qty: 30 CAPSULE | Refills: 1 | Status: SHIPPED | OUTPATIENT
Start: 2023-07-17 | End: 2023-09-15

## 2023-07-17 NOTE — PROGRESS NOTES
Assessment/Plan: Planter fasciitis bilateral.  Acquired deformity foot bilateral.  Acquired pes planus bilateral.  Radiculopathy. Mycosis of nail and skin. Plan. Chart reviewed. Patient examined. Patient advised on condition. Daughter was used as . Patient be started on Medrol Dosepak. In addition we will add gabapentin. Patient would benefit from orthotic foot control. Her feet been casted for custom molded foot orthotics. These will control deformity and ease pain. Return for follow-up. Patient will need treatment for fungus. Instruction in Yoruba given. Diagnoses and all orders for this visit:    Plantar fasciitis  -     methylPREDNISolone 4 MG tablet therapy pack; Use as directed on package    Acquired deformity of right foot    Acquired deformity of left foot    Acquired flat foot, right    Acquired flat foot, left    Radiculopathy of lumbar region  -     gabapentin (NEURONTIN) 100 mg capsule; Take 1 capsule (100 mg total) by mouth daily at bedtime    Onychomycosis    Dermatophytosis          Subjective: Patient has pain. She has pain in her arches when she walks. No history of trauma. Patient suffers from chronic low back pain.     Allergies   Allergen Reactions   • Aspirin Palpitations         Current Outpatient Medications:   •  gabapentin (NEURONTIN) 100 mg capsule, Take 1 capsule (100 mg total) by mouth daily at bedtime, Disp: 30 capsule, Rfl: 1  •  methylPREDNISolone 4 MG tablet therapy pack, Use as directed on package, Disp: 21 tablet, Rfl: 0  •  cyclobenzaprine (FLEXERIL) 10 mg tablet, Take 1 tablet (10 mg total) by mouth daily at bedtime, Disp: 10 tablet, Rfl: 0  •  famotidine (PEPCID) 40 MG tablet, Take 1 tablet (40 mg total) by mouth daily at bedtime, Disp: 30 tablet, Rfl: 2  •  ferrous sulfate 220 (44 Fe) mg/5 mL solution, Take by mouth daily (Patient not taking: Reported on 4/29/2022), Disp: , Rfl:   •  linaCLOtide (Linzess) 290 MCG CAPS, Take 1 capsule by mouth in the morning, Disp: 30 capsule, Rfl: 1  •  meloxicam (MOBIC) 15 mg tablet, TAKE 1 TABLET BY MOUTH ONCE DAILY AS NEEDED FOR MODERATE PAIN, Disp: 60 tablet, Rfl: 0  •  metroNIDAZOLE (FLAGYL) 500 mg tablet, Take 500 mg by mouth Three times a day  (Patient not taking: Reported on 4/29/2022), Disp: , Rfl:   •  pantoprazole (PROTONIX) 40 mg tablet, TAKE 1 TABLET BY MOUTH ONCE DAILY, TAKE 30 MINUTES BEFORE BREAKFAST (Patient not taking: Reported on 3/3/2023), Disp: 90 tablet, Rfl: 0  •  predniSONE 10 mg tablet, 40mg po qd x 4 days ;30mg po qd x 4 days;20mg po qd x 4 days ;10mg po qd x 4 days (Patient not taking: Reported on 10/21/2022), Disp: 40 tablet, Rfl: 0  •  triamcinolone (KENALOG) 0.5 % cream, Apply topically 2 (two) times a day (Patient not taking: Reported on 10/21/2022), Disp: 30 g, Rfl: 1    Patient Active Problem List   Diagnosis   • Tension-type headache, not intractable   • GERD (gastroesophageal reflux disease)   • Anemia   • History of colon polyps   • Chronic fatigue   • BMI 33.0-33.9,adult   • Annual physical exam   • Pain in joint, multiple sites   • Myalgia   • Fatty liver   • Poison ivy dermatitis   • Hypocalcemia   • Iron deficiency   • Chronic midline thoracic back pain   • Chronic bilateral low back pain with bilateral sciatica          Patient ID: Hanh Pandya is a 46 y.o. female. HPI    The following portions of the patient's history were reviewed and updated as appropriate:     family history includes Hyperlipidemia in her mother; Hypertension in her mother; Kidney disease in her mother and sister; No Known Problems in her brother, brother, brother, daughter, daughter, daughter, daughter, father, maternal grandfather, maternal grandmother, paternal grandfather, paternal grandmother, sister, sister, sister, sister, sister, sister, and son. reports that she has never smoked.  She has never used smokeless tobacco. She reports that she does not drink alcohol and does not use drugs.    Vitals:    07/17/23 1140   Resp: 16       Review of Systems      Objective:  Patient's shoes and socks removed. Foot Exam    General  General Appearance: appears stated age and healthy   Orientation: alert and oriented to person, place, and time   Affect: appropriate   Gait: antalgic       Right Foot/Ankle     Inspection and Palpation  Tenderness: calcaneus tenderness, bony tenderness and plantar fascia   Swelling: dorsum   Arch: pes planus  Hammertoes: fifth toe  Skin Exam: tinea; Neurovascular  Dorsalis pedis: 3+  Posterior tibial: 3+      Left Foot/Ankle      Inspection and Palpation  Tenderness: bony tenderness, plantar fascia and calcaneus tenderness   Swelling: dorsum   Arch: pes planus  Hammertoes: fifth toe  Skin Exam: tinea; Neurovascular  Dorsalis pedis: 3+  Posterior tibial: 3+        Physical Exam  Vitals and nursing note reviewed. Constitutional:       Appearance: Normal appearance. Cardiovascular:      Rate and Rhythm: Normal rate and regular rhythm. Pulses:           Dorsalis pedis pulses are 3+ on the right side and 3+ on the left side. Posterior tibial pulses are 3+ on the right side and 3+ on the left side. Musculoskeletal:      Right foot: Bony tenderness present. Left foot: Bony tenderness present. Feet:      Comments: Patient pronated in stance and gait. Pain with palpation plantar fascia body and insertion. Negative mass or rupture. Skin:     Capillary Refill: Capillary refill takes less than 2 seconds. Comments: Mycosis of nail and skin noted. Neurological:      Mental Status: She is alert. Comments: Decreased vibratory sensation bilateral   Psychiatric:         Mood and Affect: Mood normal.         Behavior: Behavior normal.         Thought Content:  Thought content normal.         Judgment: Judgment normal.

## 2023-08-23 ENCOUNTER — OFFICE VISIT (OUTPATIENT)
Dept: PODIATRY | Facility: CLINIC | Age: 53
End: 2023-08-23
Payer: COMMERCIAL

## 2023-08-23 VITALS
SYSTOLIC BLOOD PRESSURE: 139 MMHG | WEIGHT: 189 LBS | BODY MASS INDEX: 34.78 KG/M2 | HEIGHT: 62 IN | DIASTOLIC BLOOD PRESSURE: 85 MMHG | RESPIRATION RATE: 17 BRPM

## 2023-08-23 DIAGNOSIS — M54.16 RADICULOPATHY OF LUMBAR REGION: ICD-10-CM

## 2023-08-23 DIAGNOSIS — M79.672 LEFT FOOT PAIN: ICD-10-CM

## 2023-08-23 DIAGNOSIS — M72.2 PLANTAR FASCIITIS: Primary | ICD-10-CM

## 2023-08-23 DIAGNOSIS — M79.671 RIGHT FOOT PAIN: ICD-10-CM

## 2023-08-23 DIAGNOSIS — B35.1 ONYCHOMYCOSIS: ICD-10-CM

## 2023-08-23 PROCEDURE — 20550 NJX 1 TENDON SHEATH/LIGAMENT: CPT | Performed by: PODIATRIST

## 2023-08-23 PROCEDURE — 99212 OFFICE O/P EST SF 10 MIN: CPT | Performed by: PODIATRIST

## 2023-08-23 RX ORDER — MELOXICAM 15 MG/1
15 TABLET ORAL DAILY
Qty: 30 TABLET | Refills: 0 | Status: SHIPPED | OUTPATIENT
Start: 2023-08-23 | End: 2023-09-22

## 2023-08-23 NOTE — PROGRESS NOTES
Assessment/Plan: Plantar fasciitis bilateral.  Pain. Radiculopathy. Mycosis of nail and skin. Plan. Chart reviewed. Patient advised on condition via , her daughter. Today bilateral heel injection done. 1 cc Kenalog 10 injected into each heel without pain or complication. Patient will remain on Mobic. She be referred to chiropractor. Return for follow-up and treatment of fungus. Orthotic use daily. Diagnoses and all orders for this visit:    Plantar fasciitis  -     meloxicam (MOBIC) 15 mg tablet; Take 1 tablet (15 mg total) by mouth daily    Right foot pain  -     meloxicam (MOBIC) 15 mg tablet; Take 1 tablet (15 mg total) by mouth daily    Left foot pain  -     meloxicam (MOBIC) 15 mg tablet; Take 1 tablet (15 mg total) by mouth daily    Radiculopathy of lumbar region    Onychomycosis          Subjective: Patient is doing better after taking medication but she still has pain. She was unable to tolerate gabapentin.     Allergies   Allergen Reactions   • Aspirin Palpitations         Current Outpatient Medications:   •  meloxicam (MOBIC) 15 mg tablet, Take 1 tablet (15 mg total) by mouth daily, Disp: 30 tablet, Rfl: 0  •  cyclobenzaprine (FLEXERIL) 10 mg tablet, Take 1 tablet (10 mg total) by mouth daily at bedtime, Disp: 10 tablet, Rfl: 0  •  famotidine (PEPCID) 40 MG tablet, Take 1 tablet (40 mg total) by mouth daily at bedtime, Disp: 30 tablet, Rfl: 2  •  ferrous sulfate 220 (44 Fe) mg/5 mL solution, Take by mouth daily (Patient not taking: Reported on 4/29/2022), Disp: , Rfl:   •  gabapentin (NEURONTIN) 100 mg capsule, Take 1 capsule (100 mg total) by mouth daily at bedtime, Disp: 30 capsule, Rfl: 1  •  linaCLOtide (Linzess) 290 MCG CAPS, Take 1 capsule by mouth in the morning, Disp: 30 capsule, Rfl: 1  •  meloxicam (MOBIC) 15 mg tablet, TAKE 1 TABLET BY MOUTH ONCE DAILY AS NEEDED FOR MODERATE PAIN, Disp: 60 tablet, Rfl: 0  •  methylPREDNISolone 4 MG tablet therapy pack, Use as directed on package, Disp: 21 tablet, Rfl: 0  •  metroNIDAZOLE (FLAGYL) 500 mg tablet, Take 500 mg by mouth Three times a day  (Patient not taking: Reported on 4/29/2022), Disp: , Rfl:   •  pantoprazole (PROTONIX) 40 mg tablet, TAKE 1 TABLET BY MOUTH ONCE DAILY, TAKE 30 MINUTES BEFORE BREAKFAST (Patient not taking: Reported on 3/3/2023), Disp: 90 tablet, Rfl: 0  •  predniSONE 10 mg tablet, 40mg po qd x 4 days ;30mg po qd x 4 days;20mg po qd x 4 days ;10mg po qd x 4 days (Patient not taking: Reported on 10/21/2022), Disp: 40 tablet, Rfl: 0  •  triamcinolone (KENALOG) 0.5 % cream, Apply topically 2 (two) times a day (Patient not taking: Reported on 10/21/2022), Disp: 30 g, Rfl: 1    Patient Active Problem List   Diagnosis   • Tension-type headache, not intractable   • GERD (gastroesophageal reflux disease)   • Anemia   • History of colon polyps   • Chronic fatigue   • BMI 33.0-33.9,adult   • Annual physical exam   • Pain in joint, multiple sites   • Myalgia   • Fatty liver   • Poison ivy dermatitis   • Hypocalcemia   • Iron deficiency   • Chronic midline thoracic back pain   • Chronic bilateral low back pain with bilateral sciatica          Patient ID: Chrissie Nj is a 46 y.o. female. HPI    The following portions of the patient's history were reviewed and updated as appropriate:     family history includes Hyperlipidemia in her mother; Hypertension in her mother; Kidney disease in her mother and sister; No Known Problems in her brother, brother, brother, daughter, daughter, daughter, daughter, father, maternal grandfather, maternal grandmother, paternal grandfather, paternal grandmother, sister, sister, sister, sister, sister, sister, and son. reports that she has never smoked. She has never used smokeless tobacco. She reports that she does not drink alcohol and does not use drugs.     Vitals:    08/23/23 0918   BP: 139/85   Resp: 17       Review of Systems      Objective:  Patient's shoes and socks removed. Foot ExamPhysical Exam       Foot Exam     General  General Appearance: appears stated age and healthy   Orientation: alert and oriented to person, place, and time   Affect: appropriate   Gait: antalgic         Right Foot/Ankle      Inspection and Palpation  Tenderness: calcaneus tenderness, bony tenderness and plantar fascia   Swelling: dorsum   Arch: pes planus  Hammertoes: fifth toe  Skin Exam: tinea;      Neurovascular  Dorsalis pedis: 3+  Posterior tibial: 3+        Left Foot/Ankle       Inspection and Palpation  Tenderness: bony tenderness, plantar fascia and calcaneus tenderness   Swelling: dorsum   Arch: pes planus  Hammertoes: fifth toe  Skin Exam: tinea;      Neurovascular  Dorsalis pedis: 3+  Posterior tibial: 3+           Physical Exam  Vitals and nursing note reviewed. Constitutional:       Appearance: Normal appearance. Cardiovascular:      Rate and Rhythm: Normal rate and regular rhythm. Pulses:           Dorsalis pedis pulses are 3+ on the right side and 3+ on the left side. Posterior tibial pulses are 3+ on the right side and 3+ on the left side. Musculoskeletal:      Right foot: Bony tenderness present. Left foot: Bony tenderness present. Feet:      Comments: Patient pronated in stance and gait. Pain with palpation plantar fascia body and insertion. Negative mass or rupture. Skin:     Capillary Refill: Capillary refill takes less than 2 seconds. Comments: Mycosis of nail and skin noted. Neurological:      Mental Status: She is alert. Comments: Decreased vibratory sensation bilateral   Psychiatric:         Mood and Affect: Mood normal.         Behavior: Behavior normal.         Thought Content:  Thought content normal.         Judgment: Judgment normal.

## 2023-09-21 ENCOUNTER — OFFICE VISIT (OUTPATIENT)
Dept: OBGYN CLINIC | Facility: CLINIC | Age: 53
End: 2023-09-21
Payer: COMMERCIAL

## 2023-09-21 ENCOUNTER — LAB (OUTPATIENT)
Dept: LAB | Facility: AMBULARY SURGERY CENTER | Age: 53
End: 2023-09-21
Payer: COMMERCIAL

## 2023-09-21 VITALS
BODY MASS INDEX: 34.34 KG/M2 | HEIGHT: 62 IN | WEIGHT: 186.6 LBS | DIASTOLIC BLOOD PRESSURE: 80 MMHG | SYSTOLIC BLOOD PRESSURE: 142 MMHG

## 2023-09-21 DIAGNOSIS — B96.89 BACTERIAL VAGINITIS: ICD-10-CM

## 2023-09-21 DIAGNOSIS — D25.9 UTERINE LEIOMYOMA, UNSPECIFIED LOCATION: ICD-10-CM

## 2023-09-21 DIAGNOSIS — Z11.3 SCREENING FOR STD (SEXUALLY TRANSMITTED DISEASE): ICD-10-CM

## 2023-09-21 DIAGNOSIS — N76.0 BACTERIAL VAGINITIS: ICD-10-CM

## 2023-09-21 DIAGNOSIS — Z12.31 SCREENING MAMMOGRAM FOR BREAST CANCER: ICD-10-CM

## 2023-09-21 DIAGNOSIS — N93.9 ABNORMAL UTERINE BLEEDING (AUB): ICD-10-CM

## 2023-09-21 DIAGNOSIS — N93.9 ABNORMAL UTERINE BLEEDING (AUB): Primary | ICD-10-CM

## 2023-09-21 PROBLEM — L23.7 POISON IVY DERMATITIS: Status: RESOLVED | Noted: 2022-09-30 | Resolved: 2023-09-21

## 2023-09-21 PROBLEM — Z00.00 ANNUAL PHYSICAL EXAM: Status: RESOLVED | Noted: 2022-04-29 | Resolved: 2023-09-21

## 2023-09-21 PROBLEM — M72.2 PLANTAR FASCIITIS OF LEFT FOOT: Status: ACTIVE | Noted: 2019-06-27

## 2023-09-21 PROBLEM — M21.6X1 EQUINUS DEFORMITY OF BOTH FEET: Status: ACTIVE | Noted: 2019-06-27

## 2023-09-21 PROBLEM — M21.6X2 EQUINUS DEFORMITY OF BOTH FEET: Status: ACTIVE | Noted: 2019-06-27

## 2023-09-21 LAB
ERYTHROCYTE [DISTWIDTH] IN BLOOD BY AUTOMATED COUNT: 14.4 % (ref 11.6–15.1)
FSH SERPL-ACNC: 36.3 MIU/ML
HCT VFR BLD AUTO: 40 % (ref 34.8–46.1)
HGB BLD-MCNC: 12.7 G/DL (ref 11.5–15.4)
MCH RBC QN AUTO: 27.5 PG (ref 26.8–34.3)
MCHC RBC AUTO-ENTMCNC: 31.8 G/DL (ref 31.4–37.4)
MCV RBC AUTO: 87 FL (ref 82–98)
PLATELET # BLD AUTO: 382 THOUSANDS/UL (ref 149–390)
PMV BLD AUTO: 10.9 FL (ref 8.9–12.7)
RBC # BLD AUTO: 4.62 MILLION/UL (ref 3.81–5.12)
TSH SERPL DL<=0.05 MIU/L-ACNC: 1.46 UIU/ML (ref 0.45–4.5)
WBC # BLD AUTO: 7.48 THOUSAND/UL (ref 4.31–10.16)

## 2023-09-21 PROCEDURE — 36415 COLL VENOUS BLD VENIPUNCTURE: CPT

## 2023-09-21 PROCEDURE — 87491 CHLMYD TRACH DNA AMP PROBE: CPT | Performed by: OBSTETRICS & GYNECOLOGY

## 2023-09-21 PROCEDURE — 84443 ASSAY THYROID STIM HORMONE: CPT

## 2023-09-21 PROCEDURE — 85027 COMPLETE CBC AUTOMATED: CPT

## 2023-09-21 PROCEDURE — 87591 N.GONORRHOEAE DNA AMP PROB: CPT | Performed by: OBSTETRICS & GYNECOLOGY

## 2023-09-21 PROCEDURE — 83001 ASSAY OF GONADOTROPIN (FSH): CPT

## 2023-09-21 PROCEDURE — 99214 OFFICE O/P EST MOD 30 MIN: CPT | Performed by: OBSTETRICS & GYNECOLOGY

## 2023-09-21 RX ORDER — METRONIDAZOLE 7.5 MG/G
1 GEL VAGINAL
Qty: 5 G | Refills: 0 | Status: SHIPPED | OUTPATIENT
Start: 2023-09-21 | End: 2023-09-26

## 2023-09-21 NOTE — PROGRESS NOTES
Assessment/Plan:    59-year-old G5, P5 with a fibroid uterus and perimenopausal bleeding. Labs and ultrasound were ordered. Patient will monitor bleeding currently. At this point she would like to await menopause over medical and surgical treatments. She will be treated for bacterial vaginosis. She will return in the new year for reevaluation of bleeding at her annual exam.    Abnormal uterine bleeding (AUB)  -     US pelvis complete w transvaginal; Future  -     CBC; Future  -     TSH, 3rd generation with Free T4 reflex; Future  -     Follicle stimulating hormone; Future    Uterine leiomyoma, unspecified location  -     US pelvis complete w transvaginal; Future  -     CBC; Future  -     TSH, 3rd generation with Free T4 reflex; Future  -     Follicle stimulating hormone; Future    Screening mammogram for breast cancer  -     Mammo screening bilateral w 3d & cad; Future    Bacterial vaginitis  -     metroNIDAZOLE (METROGEL) 0.75 % vaginal gel; Insert 1 Application into the vagina daily at bedtime for 5 days    Screening for STD (sexually transmitted disease)  -     Chlamydia/GC amplified DNA by PCR        Subjective:      Patient ID: Kristi Peterson is a 46 y.o. female. HPI  59-year-old G5, P5 presents with 2 complaints. The first is that she has had resumption of heavy periods. Known fibroid uterus. Back last fall she had went several months without bleeding. She then had period like bleeding on both July and September. Bleeding was heavy like her normal periods. She had significant cramping. She had bloating and breast tenderness. Last year she had an ultrasound that showed a 2 cm and 6 cm fibroid. She also had an endometrial biopsy that was negative for hyperplasia and malignancy. She also complains of a vaginal odor for the last several weeks. She has vaginal irritation, and a yellow-colored discharge. She is not concerned for STDs. Currently the discharge is her major concern.   She is wondering when her bleeding will. She currently wants to await menopause over doing surgical treatment. The following portions of the patient's history were reviewed and updated as appropriate: allergies, current medications, past family history, past medical history, past social history, past surgical history and problem list.    Review of Systems   Constitutional: Negative. HENT: Negative. Respiratory: Negative. Cardiovascular: Negative. Gastrointestinal: Negative. Genitourinary: Positive for menstrual problem, pelvic pain and vaginal discharge. Neurological: Negative. Objective:      /80 (BP Location: Right arm, Patient Position: Sitting, Cuff Size: Standard)   Ht 5' 2" (1.575 m)   Wt 84.6 kg (186 lb 9.6 oz)   LMP 09/11/2023   BMI 34.13 kg/m²          Physical Exam  Vitals and nursing note reviewed. Constitutional:       Appearance: Normal appearance. She is obese. HENT:      Head: Normocephalic and atraumatic. Eyes:      Extraocular Movements: Extraocular movements intact. Conjunctiva/sclera: Conjunctivae normal.      Pupils: Pupils are equal, round, and reactive to light. Pulmonary:      Effort: Pulmonary effort is normal.   Abdominal:      General: There is no distension. Palpations: Abdomen is soft. Tenderness: There is no abdominal tenderness. There is no rebound. Genitourinary:     Labia:         Right: No rash, tenderness, lesion or injury. Left: No rash, tenderness, lesion or injury. Vagina: Vaginal discharge present. No tenderness. Cervix: Normal.      Uterus: Enlarged (10 week). Not fixed and not tender. Adnexa: Right adnexa normal and left adnexa normal.   Skin:     General: Skin is warm and dry. Neurological:      General: No focal deficit present. Mental Status: She is alert and oriented to person, place, and time.    Psychiatric:         Mood and Affect: Mood normal.         Behavior: Behavior normal.

## 2023-09-22 LAB
C TRACH DNA SPEC QL NAA+PROBE: NEGATIVE
N GONORRHOEA DNA SPEC QL NAA+PROBE: NEGATIVE

## 2023-09-22 NOTE — RESULT ENCOUNTER NOTE
Your labs all appear very normal  You are not anemic  Your thyroid level is normal  Your [address] level shows you are not menopausal

## 2023-09-29 ENCOUNTER — OFFICE VISIT (OUTPATIENT)
Dept: PODIATRY | Facility: CLINIC | Age: 53
End: 2023-09-29
Payer: COMMERCIAL

## 2023-09-29 VITALS
BODY MASS INDEX: 34.23 KG/M2 | RESPIRATION RATE: 17 BRPM | WEIGHT: 186 LBS | HEIGHT: 62 IN | DIASTOLIC BLOOD PRESSURE: 80 MMHG | SYSTOLIC BLOOD PRESSURE: 142 MMHG

## 2023-09-29 DIAGNOSIS — B35.1 ONYCHOMYCOSIS: ICD-10-CM

## 2023-09-29 DIAGNOSIS — M79.672 LEFT FOOT PAIN: ICD-10-CM

## 2023-09-29 DIAGNOSIS — M79.671 RIGHT FOOT PAIN: ICD-10-CM

## 2023-09-29 DIAGNOSIS — M72.2 PLANTAR FASCIITIS: Primary | ICD-10-CM

## 2023-09-29 DIAGNOSIS — M54.16 RADICULOPATHY OF LUMBAR REGION: ICD-10-CM

## 2023-09-29 PROCEDURE — 99212 OFFICE O/P EST SF 10 MIN: CPT | Performed by: PODIATRIST

## 2023-09-29 PROCEDURE — 20550 NJX 1 TENDON SHEATH/LIGAMENT: CPT | Performed by: PODIATRIST

## 2023-09-29 RX ORDER — MELOXICAM 15 MG/1
15 TABLET ORAL DAILY
Qty: 30 TABLET | Refills: 0 | Status: SHIPPED | OUTPATIENT
Start: 2023-09-29 | End: 2023-10-29

## 2023-09-29 RX ORDER — GABAPENTIN 300 MG/1
300 CAPSULE ORAL 2 TIMES DAILY
Qty: 60 CAPSULE | Refills: 0 | Status: SHIPPED | OUTPATIENT
Start: 2023-09-29 | End: 2023-10-29

## 2023-09-29 NOTE — PROGRESS NOTES
Assessment/Plan: Bilateral planter fasciitis. Foot pain. Acquired deformity foot. Radiculopathy. Resolving mycosis of nail. Plan. Chart reviewed. Patient examined. Patient and family advised on condition. Today bilateral trigger point injection done. Into each heel, 1 cc Kenalog 10 injected without pain or complication. Patient will be placed on Mobic. In order to help with radicular pain, patient be placed on gabapentin. Return for follow-up. Diagnoses and all orders for this visit:    Plantar fasciitis  -     meloxicam (MOBIC) 15 mg tablet; Take 1 tablet (15 mg total) by mouth daily    Right foot pain  -     meloxicam (MOBIC) 15 mg tablet; Take 1 tablet (15 mg total) by mouth daily    Left foot pain  -     meloxicam (MOBIC) 15 mg tablet; Take 1 tablet (15 mg total) by mouth daily    Radiculopathy of lumbar region  -     gabapentin (NEURONTIN) 300 mg capsule; Take 1 capsule (300 mg total) by mouth 2 (two) times a day    Onychomycosis          Subjective: Patient is somewhat better. She still has significant pain in her heels upon rising. She also gets a feeling of fullness in her feet. She has chronic low back pain.   Allergies   Allergen Reactions   • Aspirin Palpitations         Current Outpatient Medications:   •  gabapentin (NEURONTIN) 300 mg capsule, Take 1 capsule (300 mg total) by mouth 2 (two) times a day, Disp: 60 capsule, Rfl: 0  •  meloxicam (MOBIC) 15 mg tablet, Take 1 tablet (15 mg total) by mouth daily, Disp: 30 tablet, Rfl: 0  •  cyclobenzaprine (FLEXERIL) 10 mg tablet, Take 1 tablet (10 mg total) by mouth daily at bedtime, Disp: 10 tablet, Rfl: 0  •  famotidine (PEPCID) 40 MG tablet, Take 1 tablet (40 mg total) by mouth daily at bedtime (Patient not taking: Reported on 9/21/2023), Disp: 30 tablet, Rfl: 2  •  ferrous sulfate 220 (44 Fe) mg/5 mL solution, Take by mouth daily (Patient not taking: Reported on 4/29/2022), Disp: , Rfl:   •  gabapentin (NEURONTIN) 100 mg capsule, Take 1 capsule (100 mg total) by mouth daily at bedtime (Patient not taking: Reported on 9/21/2023), Disp: 30 capsule, Rfl: 1  •  linaCLOtide (Linzess) 290 MCG CAPS, Take 1 capsule by mouth in the morning, Disp: 30 capsule, Rfl: 1  •  meloxicam (MOBIC) 15 mg tablet, TAKE 1 TABLET BY MOUTH ONCE DAILY AS NEEDED FOR MODERATE PAIN (Patient not taking: Reported on 9/21/2023), Disp: 60 tablet, Rfl: 0  •  meloxicam (MOBIC) 15 mg tablet, Take 1 tablet (15 mg total) by mouth daily (Patient not taking: Reported on 9/21/2023), Disp: 30 tablet, Rfl: 0  •  methylPREDNISolone 4 MG tablet therapy pack, Use as directed on package, Disp: 21 tablet, Rfl: 0    Patient Active Problem List   Diagnosis   • Tension-type headache, not intractable   • GERD (gastroesophageal reflux disease)   • Anemia   • History of colon polyps   • Chronic fatigue   • BMI 33.0-33.9,adult   • Pain in joint, multiple sites   • Myalgia   • Fatty liver   • Hypocalcemia   • Iron deficiency   • Chronic midline thoracic back pain   • Chronic bilateral low back pain with bilateral sciatica   • Anxiety   • Constipation   • Equinus deformity of both feet   • Fibroid, uterine   • Plantar fasciitis of left foot   • FRANKLIN (stress urinary incontinence, female)          Patient ID: Zuleika Calderon is a 46 y.o. female. HPI    The following portions of the patient's history were reviewed and updated as appropriate:     family history includes Hyperlipidemia in her mother; Hypertension in her mother; Kidney disease in her mother and sister; No Known Problems in her brother, brother, brother, daughter, daughter, daughter, daughter, father, maternal grandfather, maternal grandmother, paternal grandfather, paternal grandmother, sister, sister, sister, sister, sister, sister, and son. reports that she has never smoked. She has never used smokeless tobacco. She reports that she does not drink alcohol and does not use drugs.     Vitals:    09/29/23 1251   BP: 142/80 Resp: 17       Review of Systems      Objective:  Patient's shoes and socks removed. Foot ExamPhysical Exam          Foot Exam     General  General Appearance: appears stated age and healthy   Orientation: alert and oriented to person, place, and time   Affect: appropriate   Gait: antalgic         Right Foot/Ankle      Inspection and Palpation  Tenderness: calcaneus tenderness, bony tenderness and plantar fascia   Swelling: dorsum   Arch: pes planus  Hammertoes: fifth toe  Skin Exam: tinea;      Neurovascular  Dorsalis pedis: 3+  Posterior tibial: 3+        Left Foot/Ankle       Inspection and Palpation  Tenderness: bony tenderness, plantar fascia and calcaneus tenderness   Swelling: dorsum   Arch: pes planus  Hammertoes: fifth toe  Skin Exam: tinea;      Neurovascular  Dorsalis pedis: 3+  Posterior tibial: 3+           Physical Exam  Vitals and nursing note reviewed. Constitutional:       Appearance: Normal appearance. Cardiovascular:      Rate and Rhythm: Normal rate and regular rhythm.      Pulses:           Dorsalis pedis pulses are 3+ on the right side and 3+ on the left side.        Posterior tibial pulses are 3+ on the right side and 3+ on the left side. Musculoskeletal:      Right foot: Bony tenderness present.      Left foot: Bony tenderness present. Feet:      Comments: Patient pronated in stance and gait.  Pain with palpation plantar fascia body and insertion.  Negative mass or rupture. Skin:     Capillary Refill: Capillary refill takes less than 2 seconds.      Comments: Mycosis of nail and skin noted.   Neurological:      Mental Status: She is alert.      Comments: Decreased vibratory sensation bilateral   Psychiatric:         Mood and Affect: Mood normal.         Behavior: Behavior normal.         Thought Content:  Thought content normal.         Judgment: Judgment normal.

## 2023-10-12 ENCOUNTER — HOSPITAL ENCOUNTER (OUTPATIENT)
Dept: ULTRASOUND IMAGING | Facility: HOSPITAL | Age: 53
Discharge: HOME | End: 2023-10-12
Attending: OBSTETRICS & GYNECOLOGY
Payer: COMMERCIAL

## 2023-10-12 ENCOUNTER — APPOINTMENT (OUTPATIENT)
Dept: LAB | Facility: CLINIC | Age: 53
End: 2023-10-12
Payer: COMMERCIAL

## 2023-10-12 DIAGNOSIS — E61.1 LOW IRON: ICD-10-CM

## 2023-10-12 DIAGNOSIS — N93.9 ABNORMAL UTERINE BLEEDING (AUB): ICD-10-CM

## 2023-10-12 DIAGNOSIS — D25.9 UTERINE LEIOMYOMA, UNSPECIFIED LOCATION: ICD-10-CM

## 2023-10-12 LAB
FERRITIN SERPL-MCNC: 12 NG/ML (ref 11–307)
IRON SATN MFR SERPL: 17 % (ref 15–50)
IRON SERPL-MCNC: 71 UG/DL (ref 50–212)
TIBC SERPL-MCNC: 426 UG/DL (ref 250–450)
UIBC SERPL-MCNC: 355 UG/DL (ref 155–355)

## 2023-10-12 PROCEDURE — 83550 IRON BINDING TEST: CPT

## 2023-10-12 PROCEDURE — 36415 COLL VENOUS BLD VENIPUNCTURE: CPT

## 2023-10-12 PROCEDURE — 83540 ASSAY OF IRON: CPT

## 2023-10-12 PROCEDURE — 82728 ASSAY OF FERRITIN: CPT

## 2023-10-12 PROCEDURE — 76856 US EXAM PELVIC COMPLETE: CPT

## 2023-10-12 PROCEDURE — 76830 TRANSVAGINAL US NON-OB: CPT

## 2023-10-20 ENCOUNTER — TELEPHONE (OUTPATIENT)
Age: 53
End: 2023-10-20

## 2023-10-20 NOTE — TELEPHONE ENCOUNTER
Called patient to schedule follow up appointment after 09/29 appointment. Patient's daughter answered. I offered an open appointment for 10/27. Patient's daughter said the patient would not be able to make at that time, she asked to be placed on the wait list. Patient was placed on the wait list as requested.

## 2023-10-23 ENCOUNTER — TELEPHONE (OUTPATIENT)
Age: 53
End: 2023-10-23

## 2023-10-23 NOTE — TELEPHONE ENCOUNTER
Called patient from high priority on wait list to schedule appointment for Friday. Patient's daughter picked up the phone. Appointment successfully scheduled.

## 2023-10-27 ENCOUNTER — TELEPHONE (OUTPATIENT)
Age: 53
End: 2023-10-27

## 2023-10-27 NOTE — TELEPHONE ENCOUNTER
Called and spoke to patient's daughter to confirm appointment with Dr. Darling Gómez for today at 11:15 am.

## 2023-11-22 ENCOUNTER — OFFICE VISIT (OUTPATIENT)
Dept: FAMILY MEDICINE CLINIC | Facility: CLINIC | Age: 53
End: 2023-11-22
Payer: COMMERCIAL

## 2023-11-22 VITALS
HEIGHT: 62 IN | OXYGEN SATURATION: 97 % | DIASTOLIC BLOOD PRESSURE: 85 MMHG | WEIGHT: 190 LBS | SYSTOLIC BLOOD PRESSURE: 132 MMHG | TEMPERATURE: 97.5 F | BODY MASS INDEX: 34.96 KG/M2 | HEART RATE: 86 BPM

## 2023-11-22 DIAGNOSIS — R42 DIZZINESS: ICD-10-CM

## 2023-11-22 DIAGNOSIS — M54.2 NECK AND SHOULDER PAIN: Primary | ICD-10-CM

## 2023-11-22 DIAGNOSIS — H61.21 IMPACTED CERUMEN OF RIGHT EAR: ICD-10-CM

## 2023-11-22 DIAGNOSIS — M25.519 NECK AND SHOULDER PAIN: Primary | ICD-10-CM

## 2023-11-22 DIAGNOSIS — R53.82 CHRONIC FATIGUE: ICD-10-CM

## 2023-11-22 DIAGNOSIS — Z13.220 SCREENING FOR LIPID DISORDERS: ICD-10-CM

## 2023-11-22 DIAGNOSIS — H69.93 DYSFUNCTION OF BOTH EUSTACHIAN TUBES: ICD-10-CM

## 2023-11-22 PROCEDURE — 99214 OFFICE O/P EST MOD 30 MIN: CPT

## 2023-11-22 RX ORDER — MELOXICAM 15 MG/1
15 TABLET ORAL DAILY
Qty: 30 TABLET | Refills: 0 | Status: SHIPPED | OUTPATIENT
Start: 2023-11-22

## 2023-11-22 RX ORDER — CYCLOBENZAPRINE HCL 10 MG
10 TABLET ORAL
Qty: 10 TABLET | Refills: 0 | Status: SHIPPED | OUTPATIENT
Start: 2023-11-22

## 2023-11-22 RX ORDER — MECLIZINE HYDROCHLORIDE 25 MG/1
25 TABLET ORAL 3 TIMES DAILY PRN
Qty: 30 TABLET | Refills: 0 | Status: SHIPPED | OUTPATIENT
Start: 2023-11-22

## 2023-11-22 RX ORDER — FLUTICASONE PROPIONATE 50 MCG
1 SPRAY, SUSPENSION (ML) NASAL DAILY
Qty: 9.9 ML | Refills: 1 | Status: SHIPPED | OUTPATIENT
Start: 2023-11-22

## 2023-11-22 NOTE — ASSESSMENT & PLAN NOTE
Pt reports ongoing chronic fatigue, recent iron panel, CBC and TSH wnl, negative depression screen.  Will check vit D levels

## 2023-11-22 NOTE — PROGRESS NOTES
Name: Mary Barboza      : 1970      MRN: [id number]  Encounter Provider: Lakeisha S [facility] Whitaker  Encounter Date: 2023   Encounter department: [facility] MEDICINE    Assessment & Plan     1. Neck and shoulder pain  Assessment & Plan:  Pt with neck and shoulder pain worse last  4 weeks. On exam pt has decreased ROM neck and muscle tenderness on palpation. Pt has been taking OTC Aleve without relief, would prefer not to do physical therapy. Start Meloxicam and cyclobenzaprine 10 mg as ordered. Recommend heat 15-20 min as tolerated and stretching exercises. Seek immediate f/u for worsening sxs. Orders:  -     cyclobenzaprine (FLEXERIL) 10 mg tablet; Take 1 tablet (10 mg total) by mouth daily at bedtime  -     meloxicam (MOBIC) 15 mg tablet; Take 1 tablet (15 mg total) by mouth daily    2. Impacted cerumen of right ear  Assessment & Plan:  Pre tx with debrox drops x 4 days and f/u for irrigation if no improvement    Orders:  -     carbamide peroxide (DEBROX) 6.5 % otic solution; Administer 5 drops to the right ear 2 (two) times a day    3. Chronic fatigue  Assessment & Plan:  Pt reports ongoing chronic fatigue, recent iron panel, CBC and TSH wnl, negative depression screen. Will check vit D levels    Orders:  -     Vitamin D 25 hydroxy; Future    4. Dizziness  -     meclizine (ANTIVERT) 25 mg tablet; Take 1 tablet (25 mg total) by mouth 3 (three) times a day as needed for dizziness    5. Dysfunction of both eustachian tubes  Assessment & Plan:  Pt with tinnitus, ear fullness and vertigo like symptoms worse over past  week. R ear with cerumen impaction recommend tx with OTC debrox drops and start nasal steroid spray as prescribed. Meclizine 25 mg prn TID prescribed and advised f/u for persistent/worsening sxs.      Orders:  -     carbamide peroxide (DEBROX) 6.5 % otic solution; Administer 5 drops to the right ear 2 (two) times a day  -     fluticasone (FLONASE) 50 mcg/act nasal spray; 1 spray into each nostril daily  -     meclizine (ANTIVERT) 25 mg tablet; Take 1 tablet (25 mg total) by mouth 3 (three) times a day as needed for dizziness    6. Screening for lipid disorders  -     Lipid panel; Future           Subjective      Pt presents with c/o chronic neck and b/l shoulder pain for several months that is not improving with tylenol or naproxen. Pain radiates into posterior occipital region. Pt has decreased ROM in neck and shoulder. Dizziness on standing with tinnitus for past week has PND. R ear on exam with cerumen impaction, neuro exam wnl    Chronic fatigue not improving, negative depression screening recent CBC, iron panel and TSH wnl. Will check Vit D levels. Pt requesting to check lipid panel secondary to hx of fatty liver       Review of Systems   Constitutional:  Positive for fatigue. Negative for activity change, appetite change, chills, fever and unexpected weight change. HENT:  Positive for postnasal drip and tinnitus. Negative for congestion, dental problem, drooling, ear discharge, ear pain, facial swelling, hearing loss, rhinorrhea, sinus pressure, sinus pain, sneezing, trouble swallowing and voice change. Eyes:  Negative for photophobia, pain, discharge, redness, itching and visual disturbance. Respiratory:  Negative for cough, choking, chest tightness, shortness of breath and wheezing. Cardiovascular:  Negative for chest pain. Gastrointestinal:  Negative for abdominal distention, abdominal pain, anal bleeding, constipation, diarrhea, nausea and vomiting. Endocrine: Negative for cold intolerance and heat intolerance. Genitourinary:  Negative for difficulty urinating. Musculoskeletal:  Positive for arthralgias and neck pain. Negative for joint swelling, myalgias and neck stiffness. Skin:  Negative for color change. Allergic/Immunologic: Negative for environmental allergies. Neurological:  Positive for dizziness and headaches. Negative for tremors, seizures, syncope, facial asymmetry, speech difficulty, weakness, light-headedness and numbness. Hematological:  Negative for adenopathy. Psychiatric/Behavioral:  Negative for decreased concentration, sleep disturbance and suicidal ideas. Current Outpatient Medications on File Prior to Visit   Medication Sig    linaCLOtide (Linzess) 290 MCG CAPS Take 1 capsule by mouth in the morning    methylPREDNISolone 4 MG tablet therapy pack Use as directed on package    [DISCONTINUED] cyclobenzaprine (FLEXERIL) 10 mg tablet Take 1 tablet (10 mg total) by mouth daily at bedtime    [DISCONTINUED] meloxicam (MOBIC) 15 mg tablet TAKE 1 TABLET BY MOUTH ONCE DAILY AS NEEDED FOR MODERATE PAIN    famotidine (PEPCID) 40 MG tablet Take 1 tablet (40 mg total) by mouth daily at bedtime (Patient not taking: Reported on 9/21/2023)    ferrous sulfate 220 (44 Fe) mg/5 mL solution Take by mouth daily (Patient not taking: Reported on 4/29/2022)    gabapentin (NEURONTIN) 100 mg capsule Take 1 capsule (100 mg total) by mouth daily at bedtime (Patient not taking: Reported on 9/21/2023)    gabapentin (NEURONTIN) 300 mg capsule Take 1 capsule (300 mg total) by mouth 2 (two) times a day    meloxicam (MOBIC) 15 mg tablet Take 1 tablet (15 mg total) by mouth daily (Patient not taking: Reported on 9/21/2023)    meloxicam (MOBIC) 15 mg tablet Take 1 tablet (15 mg total) by mouth daily       Objective     /85 (BP Location: Right arm, Patient Position: Sitting, Cuff Size: Standard)   Pulse 86   Temp 97.5 °F (36.4 °C) (Tympanic)   Ht 5' 2" (1.575 m)   Wt 86.2 kg (190 lb)   SpO2 97%   BMI 34.75 kg/m²     Physical Exam  Vitals and nursing note reviewed. Constitutional:       General: She is not in acute distress. Appearance: Normal appearance. She is normal weight. She is not ill-appearing or toxic-appearing. HENT:      Head: Normocephalic and atraumatic.       Right Ear: Tympanic membrane, ear canal and external ear normal. No decreased hearing noted. No tenderness. There is impacted cerumen. Left Ear: Ear canal and external ear normal. No decreased hearing noted. No tenderness. A middle ear effusion is present. There is no impacted cerumen. Tympanic membrane is not perforated, erythematous, retracted or bulging. Nose: Nose normal. No congestion or rhinorrhea. Mouth/Throat:      Mouth: Mucous membranes are moist.      Pharynx: Oropharynx is clear. No oropharyngeal exudate or posterior oropharyngeal erythema. Eyes:      General: No visual field deficit. Right eye: No discharge. Left eye: No discharge. Extraocular Movements: Extraocular movements intact. Conjunctiva/sclera: Conjunctivae normal.      Pupils: Pupils are equal, round, and reactive to light. Neck:      Thyroid: No thyroid mass or thyromegaly. Vascular: No carotid bruit. Trachea: Trachea normal.      Meningeal: Brudzinski's sign and Kernig's sign absent. Cardiovascular:      Rate and Rhythm: Normal rate and regular rhythm. Pulses: Normal pulses. Heart sounds: Normal heart sounds. No murmur heard. Pulmonary:      Effort: Pulmonary effort is normal. No respiratory distress. Breath sounds: Normal breath sounds. No stridor. No wheezing, rhonchi or rales. Chest:      Chest wall: No tenderness. Abdominal:      General: Bowel sounds are normal. There is no distension. Palpations: Abdomen is soft. There is no mass. Tenderness: There is no abdominal tenderness. There is no right CVA tenderness or left CVA tenderness. Musculoskeletal:         General: No swelling, tenderness, deformity or signs of injury. Right shoulder: Bony tenderness present. No swelling, deformity, effusion, laceration or crepitus. Normal range of motion. Normal strength. Normal pulse. Left shoulder: Bony tenderness present. No swelling, deformity, effusion, laceration or crepitus.  Normal range of motion. Normal strength. Normal pulse. Cervical back: Neck supple. No edema, erythema, signs of trauma, rigidity, torticollis, tenderness or crepitus. Pain with movement and muscular tenderness present. No spinous process tenderness. Decreased range of motion. Right lower leg: No edema. Left lower leg: No edema. Lymphadenopathy:      Cervical: No cervical adenopathy. Skin:     General: Skin is warm. Capillary Refill: Capillary refill takes less than 2 seconds. Coloration: Skin is not jaundiced or pale. Findings: No bruising, erythema, lesion or rash. Neurological:      General: No focal deficit present. Mental Status: She is alert and oriented to person, place, and time. Cranial Nerves: Cranial nerves 2-12 are intact. No cranial nerve deficit, dysarthria or facial asymmetry. Sensory: Sensation is intact. No sensory deficit. Motor: Motor function is intact. No weakness, tremor, atrophy, abnormal muscle tone, seizure activity or pronator drift. Coordination: Coordination is intact. Romberg sign negative. Coordination normal. Finger-Nose-Finger Test and Heel to Luis Test normal. Rapid alternating movements normal.      Gait: Gait is intact. Gait and tandem walk normal.      Deep Tendon Reflexes: Reflexes are normal and symmetric. Reflexes normal.   Psychiatric:         Mood and Affect: Mood normal.         Behavior: Behavior normal.         Thought Content:  Thought content normal.       [address] Whitaker

## 2023-11-22 NOTE — ASSESSMENT & PLAN NOTE
Pt with tinnitus, ear fullness and vertigo like symptoms worse over past  week. R ear with cerumen impaction recommend tx with OTC debrox drops and start nasal steroid spray as prescribed. Meclizine 25 mg prn TID prescribed and advised f/u for persistent/worsening sxs.

## 2023-11-22 NOTE — ASSESSMENT & PLAN NOTE
Pt with neck and shoulder pain worse last  4 weeks. On exam pt has decreased ROM neck and muscle tenderness on palpation. Pt has been taking OTC Aleve without relief, would prefer not to do physical therapy. Start Meloxicam and cyclobenzaprine 10 mg as ordered. Recommend heat 15-20 min as tolerated and stretching exercises. Seek immediate f/u for worsening sxs.

## 2023-12-13 ENCOUNTER — RA CDI HCC (OUTPATIENT)
Dept: OTHER | Facility: HOSPITAL | Age: 53
End: 2023-12-13

## 2023-12-20 ENCOUNTER — OFFICE VISIT (OUTPATIENT)
Dept: FAMILY MEDICINE CLINIC | Facility: CLINIC | Age: 53
End: 2023-12-20
Payer: COMMERCIAL

## 2023-12-20 VITALS
OXYGEN SATURATION: 99 % | DIASTOLIC BLOOD PRESSURE: 84 MMHG | RESPIRATION RATE: 16 BRPM | TEMPERATURE: 98 F | WEIGHT: 189 LBS | HEART RATE: 90 BPM | SYSTOLIC BLOOD PRESSURE: 138 MMHG | HEIGHT: 62 IN | BODY MASS INDEX: 34.78 KG/M2

## 2023-12-20 DIAGNOSIS — Z86.010 HISTORY OF COLON POLYPS: ICD-10-CM

## 2023-12-20 DIAGNOSIS — G89.29 CHRONIC BILATERAL LOW BACK PAIN WITH BILATERAL SCIATICA: ICD-10-CM

## 2023-12-20 DIAGNOSIS — M54.41 CHRONIC BILATERAL LOW BACK PAIN WITH BILATERAL SCIATICA: ICD-10-CM

## 2023-12-20 DIAGNOSIS — M54.42 CHRONIC BILATERAL LOW BACK PAIN WITH BILATERAL SCIATICA: ICD-10-CM

## 2023-12-20 DIAGNOSIS — M25.519 NECK AND SHOULDER PAIN: ICD-10-CM

## 2023-12-20 DIAGNOSIS — M54.2 NECK AND SHOULDER PAIN: ICD-10-CM

## 2023-12-20 DIAGNOSIS — M54.16 RADICULOPATHY OF LUMBAR REGION: ICD-10-CM

## 2023-12-20 DIAGNOSIS — Z00.00 ANNUAL PHYSICAL EXAM: Primary | ICD-10-CM

## 2023-12-20 DIAGNOSIS — K59.01 SLOW TRANSIT CONSTIPATION: ICD-10-CM

## 2023-12-20 DIAGNOSIS — H81.13 BENIGN PAROXYSMAL POSITIONAL VERTIGO DUE TO BILATERAL VESTIBULAR DISORDER: ICD-10-CM

## 2023-12-20 DIAGNOSIS — M25.50 PAIN IN JOINT, MULTIPLE SITES: ICD-10-CM

## 2023-12-20 PROBLEM — E61.1 IRON DEFICIENCY: Status: RESOLVED | Noted: 2022-10-21 | Resolved: 2023-12-20

## 2023-12-20 PROBLEM — H61.21 IMPACTED CERUMEN OF RIGHT EAR: Status: RESOLVED | Noted: 2023-11-22 | Resolved: 2023-12-20

## 2023-12-20 PROBLEM — M79.10 MYALGIA: Status: RESOLVED | Noted: 2022-04-29 | Resolved: 2023-12-20

## 2023-12-20 PROBLEM — H69.93 DYSFUNCTION OF BOTH EUSTACHIAN TUBES: Status: RESOLVED | Noted: 2023-11-22 | Resolved: 2023-12-20

## 2023-12-20 PROBLEM — E83.51 HYPOCALCEMIA: Status: RESOLVED | Noted: 2022-09-30 | Resolved: 2023-12-20

## 2023-12-20 PROCEDURE — 99214 OFFICE O/P EST MOD 30 MIN: CPT | Performed by: FAMILY MEDICINE

## 2023-12-20 PROCEDURE — 99396 PREV VISIT EST AGE 40-64: CPT | Performed by: FAMILY MEDICINE

## 2023-12-20 RX ORDER — MECLIZINE HCL 12.5 MG/1
12.5 TABLET ORAL 3 TIMES DAILY PRN
Qty: 30 TABLET | Refills: 3 | Status: SHIPPED | OUTPATIENT
Start: 2023-12-20

## 2023-12-20 RX ORDER — GABAPENTIN 300 MG/1
300 CAPSULE ORAL 2 TIMES DAILY
Qty: 60 CAPSULE | Refills: 0 | Status: SHIPPED | OUTPATIENT
Start: 2023-12-20 | End: 2024-01-19

## 2023-12-20 NOTE — PROGRESS NOTES
Name: Gris Luque      : 1970      MRN: [id number]  Encounter Provider: Juliana Angel MD  Encounter Date: 2023   Encounter department: [facility] MEDICINE    Assessment & Plan     1. Annual physical exam  Assessment & Plan:  Check routine labs      Orders:  -     Comprehensive metabolic panel; Future; Expected date: 2023    2. History of colon polyps  Assessment & Plan:  Colonoscopy due       3. Neck and shoulder pain  Assessment & Plan:  Would benefit from PT    Orders:  -     Ambulatory Referral to Physical Therapy; Future    4. Benign paroxysmal positional vertigo due to bilateral vestibular disorder  Assessment & Plan:  Meclizine 12.5mg po tid     Orders:  -     meclizine (ANTIVERT) 12.5 MG tablet; Take 1 tablet (12.5 mg total) by mouth 3 (three) times a day as needed for dizziness    5. Pain in joint, multiple sites  Assessment & Plan:  Chronic will send to rheumatology and check  labs    Orders:  -     BLADIMIR Comprehensive Panel; Future  -     Sedimentation rate, automated; Future  -     RF Screen w/ Reflex to Titer; Future  -     Ambulatory Referral to Rheumatology; Future    6. Radiculopathy of lumbar region  -     gabapentin (NEURONTIN) 300 mg capsule; Take 1 capsule (300 mg total) by mouth 2 (two) times a day    7. Chronic bilateral low back pain with bilateral sciatica  Assessment & Plan:  Refill gabapentin      8. Slow transit constipation  Assessment & Plan:  Increased fruits and vegetable       BMI Counseling: Body mass index is 34.57 kg/m². The BMI is above normal. Exercise recommendations include exercising 3-5 times per week and strength training exercises.     Depression Screening and Follow-up Plan: Patient was screened for depression during today's encounter. They screened negative with a PHQ-2 score of 0.        Subjective      HPI Patient here for annual physical and interval visit    Review of Systems   Constitutional:  Negative for appetite  change, chills, fatigue and fever.   Respiratory:  Negative for cough, chest tightness and shortness of breath.    Cardiovascular:  Negative for chest pain, palpitations and leg swelling.   Gastrointestinal:  Negative for abdominal pain, constipation, diarrhea, nausea and vomiting.   Genitourinary:  Negative for difficulty urinating and frequency.   Musculoskeletal:  Positive for arthralgias (all joints), back pain (chronic) and neck pain (neckand shouldes). Negative for gait problem.   Skin:  Negative for rash.   Neurological:  Positive for dizziness (vertigo on  and off). Negative for weakness, light-headedness, numbness and headaches.   Hematological:  Does not bruise/bleed easily.   Psychiatric/Behavioral:  Negative for dysphoric mood and sleep disturbance. The patient is not nervous/anxious.        Current Outpatient Medications on File Prior to Visit   Medication Sig   • [DISCONTINUED] carbamide peroxide (DEBROX) 6.5 % otic solution Administer 5 drops to the right ear 2 (two) times a day (Patient not taking: Reported on 12/20/2023)   • [DISCONTINUED] cyclobenzaprine (FLEXERIL) 10 mg tablet Take 1 tablet (10 mg total) by mouth daily at bedtime (Patient not taking: Reported on 12/20/2023)   • [DISCONTINUED] famotidine (PEPCID) 40 MG tablet Take 1 tablet (40 mg total) by mouth daily at bedtime (Patient not taking: Reported on 12/20/2023)   • [DISCONTINUED] ferrous sulfate 220 (44 Fe) mg/5 mL solution Take by mouth daily (Patient not taking: Reported on 4/29/2022)   • [DISCONTINUED] fluticasone (FLONASE) 50 mcg/act nasal spray 1 spray into each nostril daily (Patient not taking: Reported on 12/20/2023)   • [DISCONTINUED] gabapentin (NEURONTIN) 100 mg capsule Take 1 capsule (100 mg total) by mouth daily at bedtime (Patient not taking: Reported on 9/21/2023)   • [DISCONTINUED] gabapentin (NEURONTIN) 300 mg capsule Take 1 capsule (300 mg total) by mouth 2 (two) times a day (Patient not taking: Reported on 12/20/2023)  "  • [DISCONTINUED] linaCLOtide (Linzess) 290 MCG CAPS Take 1 capsule by mouth in the morning (Patient not taking: Reported on 12/20/2023)   • [DISCONTINUED] meclizine (ANTIVERT) 25 mg tablet Take 1 tablet (25 mg total) by mouth 3 (three) times a day as needed for dizziness (Patient not taking: Reported on 12/20/2023)   • [DISCONTINUED] meloxicam (MOBIC) 15 mg tablet Take 1 tablet (15 mg total) by mouth daily (Patient not taking: Reported on 9/21/2023)   • [DISCONTINUED] meloxicam (MOBIC) 15 mg tablet Take 1 tablet (15 mg total) by mouth daily (Patient not taking: Reported on 12/20/2023)   • [DISCONTINUED] meloxicam (MOBIC) 15 mg tablet Take 1 tablet (15 mg total) by mouth daily (Patient not taking: Reported on 12/20/2023)   • [DISCONTINUED] methylPREDNISolone 4 MG tablet therapy pack Use as directed on package (Patient not taking: Reported on 12/20/2023)       Objective     /84 (BP Location: Left arm, Patient Position: Sitting, Cuff Size: Standard)   Pulse 90   Temp 98 °F (36.7 °C) (Tympanic)   Resp 16   Ht 5' 2\" (1.575 m)   Wt 85.7 kg (189 lb)   SpO2 99%   BMI 34.57 kg/m²     Physical Exam  Vitals reviewed.   Constitutional:       General: She is not in acute distress.     Appearance: Normal appearance. She is well-developed. She is obese. She is not ill-appearing.   HENT:      Head: Normocephalic.      Right Ear: Tympanic membrane, ear canal and external ear normal.      Left Ear: Tympanic membrane, ear canal and external ear normal.      Nose: Nose normal.      Mouth/Throat:      Mouth: Mucous membranes are moist.      Pharynx: No oropharyngeal exudate.   Eyes:      General: Lids are normal. No scleral icterus.     Extraocular Movements: Extraocular movements intact.      Conjunctiva/sclera: Conjunctivae normal.      Pupils: Pupils are equal, round, and reactive to light.   Neck:      Thyroid: No thyromegaly.      Vascular: No carotid bruit.   Cardiovascular:      Rate and Rhythm: Normal rate and " regular rhythm.      Pulses: Normal pulses.      Heart sounds: Normal heart sounds. No murmur heard.     No friction rub.   Pulmonary:      Effort: Pulmonary effort is normal.      Breath sounds: Normal breath sounds. No wheezing, rhonchi or rales.   Abdominal:      General: Bowel sounds are normal. There is no distension.      Palpations: Abdomen is soft. There is no mass.      Tenderness: There is no abdominal tenderness. There is no guarding.      Hernia: No hernia is present.   Musculoskeletal:         General: Normal range of motion.      Cervical back: Normal range of motion and neck supple.   Lymphadenopathy:      Cervical: No cervical adenopathy.   Skin:     General: Skin is warm and dry.      Findings: No rash.      Comments: No abnormal appearing moles   Neurological:      General: No focal deficit present.      Mental Status: She is alert and oriented to person, place, and time. Mental status is at baseline.      Cranial Nerves: No cranial nerve deficit.      Sensory: No sensory deficit.      Motor: No weakness, tremor or abnormal muscle tone.      Coordination: Coordination normal.      Gait: Gait normal.      Deep Tendon Reflexes: Reflexes normal.   Psychiatric:         Mood and Affect: Mood normal.         Speech: Speech normal.         Behavior: Behavior normal.     BMI Counseling: Body mass index is 34.57 kg/m². The BMI is above normal. Nutrition recommendations include 3-5 servings of fruits/vegetables daily, reducing fast food intake, consuming healthier snacks, decreasing soda and/or juice intake, moderation in carbohydrate intake, and increasing intake of lean protein. Exercise recommendations include exercising 3-5 times per week and strength training exercises.  Juliana Angel MD

## 2024-01-10 ENCOUNTER — TELEPHONE (OUTPATIENT)
Dept: RHEUMATOLOGY | Facility: CLINIC | Age: 54
End: 2024-01-10

## 2024-01-10 NOTE — TELEPHONE ENCOUNTER
Called and left message for patient to call back to reschedule appt today with Dr. Alvarenga as he is out sick. Can offer appointment with Yisel Holliday as she has sooner new patient visit open. If patient would like to stay with Dr. Alvarenga please schedule next available.

## 2024-01-16 DIAGNOSIS — M54.16 RADICULOPATHY OF LUMBAR REGION: ICD-10-CM

## 2024-01-16 RX ORDER — GABAPENTIN 300 MG/1
300 CAPSULE ORAL 2 TIMES DAILY
Qty: 60 CAPSULE | Refills: 5 | Status: SHIPPED | OUTPATIENT
Start: 2024-01-16

## 2024-01-20 PROBLEM — Z00.00 ANNUAL PHYSICAL EXAM: Status: RESOLVED | Noted: 2022-04-29 | Resolved: 2024-01-20

## 2024-01-29 ENCOUNTER — NURSE TRIAGE (OUTPATIENT)
Age: 54
End: 2024-01-29

## 2024-01-29 NOTE — TELEPHONE ENCOUNTER
"Reason for Disposition   Information only question and nurse able to answer    Answer Assessment - Initial Assessment Questions  1. REASON FOR CALL or QUESTION: \"What is your reason for calling today?\" or \"How can I best help you?\" or \"What question do you have that I can help answer?\"      REschedule appt    Protocols used: Information Only Call - No Triage-ADULT-[place]    Missed appt today. Pt's daughter calling to reschedule. Rescheduled for 3/22/24 with Dr. Alvarenga.   "

## 2024-02-13 ENCOUNTER — HOSPITAL ENCOUNTER (OUTPATIENT)
Dept: MAMMOGRAPHY | Facility: HOSPITAL | Age: 54
Discharge: HOME | End: 2024-02-13
Attending: OBSTETRICS & GYNECOLOGY
Payer: COMMERCIAL

## 2024-02-13 VITALS — HEIGHT: 62 IN | BODY MASS INDEX: 34.78 KG/M2 | WEIGHT: 189 LBS

## 2024-02-13 DIAGNOSIS — Z12.31 SCREENING MAMMOGRAM FOR BREAST CANCER: ICD-10-CM

## 2024-02-13 PROCEDURE — 77067 SCR MAMMO BI INCL CAD: CPT

## 2024-02-13 PROCEDURE — 77063 BREAST TOMOSYNTHESIS BI: CPT

## 2024-03-15 ENCOUNTER — HOSPITAL ENCOUNTER (OUTPATIENT)
Dept: RADIOLOGY | Facility: HOSPITAL | Age: 54
Discharge: HOME | End: 2024-03-15
Payer: COMMERCIAL

## 2024-03-15 ENCOUNTER — APPOINTMENT (OUTPATIENT)
Dept: LAB | Facility: CLINIC | Age: 54
End: 2024-03-15
Payer: COMMERCIAL

## 2024-03-15 DIAGNOSIS — E56.9 MULTIPLE VITAMIN DEFICIENCY DISEASE: ICD-10-CM

## 2024-03-15 DIAGNOSIS — M54.2 CERVICALGIA: ICD-10-CM

## 2024-03-15 DIAGNOSIS — R53.83 OTHER FATIGUE: ICD-10-CM

## 2024-03-15 DIAGNOSIS — E03.9 MYXEDEMA HEART DISEASE: ICD-10-CM

## 2024-03-15 DIAGNOSIS — D64.9 ANEMIA, UNSPECIFIED TYPE: ICD-10-CM

## 2024-03-15 DIAGNOSIS — E78.5 HYPERLIPIDEMIA, UNSPECIFIED HYPERLIPIDEMIA TYPE: ICD-10-CM

## 2024-03-15 DIAGNOSIS — I51.9 MYXEDEMA HEART DISEASE: ICD-10-CM

## 2024-03-15 LAB
25(OH)D3 SERPL-MCNC: 27.7 NG/ML (ref 30–100)
ALBUMIN SERPL BCP-MCNC: 4.1 G/DL (ref 3.5–5)
ALP SERPL-CCNC: 85 U/L (ref 34–104)
ALT SERPL W P-5'-P-CCNC: 26 U/L (ref 7–52)
ANION GAP SERPL CALCULATED.3IONS-SCNC: 5 MMOL/L (ref 4–13)
AST SERPL W P-5'-P-CCNC: 18 U/L (ref 13–39)
BASOPHILS # BLD AUTO: 0.06 THOUSANDS/ÂΜL (ref 0–0.1)
BASOPHILS NFR BLD AUTO: 1 % (ref 0–1)
BILIRUB SERPL-MCNC: 0.51 MG/DL (ref 0.2–1)
BUN SERPL-MCNC: 11 MG/DL (ref 5–25)
CALCIUM SERPL-MCNC: 9 MG/DL (ref 8.4–10.2)
CHLORIDE SERPL-SCNC: 106 MMOL/L (ref 96–108)
CHOLEST SERPL-MCNC: 144 MG/DL
CO2 SERPL-SCNC: 28 MMOL/L (ref 21–32)
CREAT SERPL-MCNC: 0.48 MG/DL (ref 0.6–1.3)
CRP SERPL HS-MCNC: 9.04 MG/L
EOSINOPHIL # BLD AUTO: 0.24 THOUSAND/ÂΜL (ref 0–0.61)
EOSINOPHIL NFR BLD AUTO: 3 % (ref 0–6)
ERYTHROCYTE [DISTWIDTH] IN BLOOD BY AUTOMATED COUNT: 14.1 % (ref 11.6–15.1)
ERYTHROCYTE [SEDIMENTATION RATE] IN BLOOD: 30 MM/HOUR (ref 0–29)
EST. AVERAGE GLUCOSE BLD GHB EST-MCNC: 126 MG/DL
FERRITIN SERPL-MCNC: 22 NG/ML (ref 11–307)
FOLATE SERPL-MCNC: 13.5 NG/ML
GFR SERPL CREATININE-BSD FRML MDRD: 112 ML/MIN/1.73SQ M
GLUCOSE P FAST SERPL-MCNC: 91 MG/DL (ref 65–99)
HBA1C MFR BLD: 6 %
HCT VFR BLD AUTO: 41.4 % (ref 34.8–46.1)
HDLC SERPL-MCNC: 56 MG/DL
HGB BLD-MCNC: 13.4 G/DL (ref 11.5–15.4)
IMM GRANULOCYTES # BLD AUTO: 0.02 THOUSAND/UL (ref 0–0.2)
IMM GRANULOCYTES NFR BLD AUTO: 0 % (ref 0–2)
IRON SATN MFR SERPL: 17 % (ref 15–50)
IRON SERPL-MCNC: 68 UG/DL (ref 50–212)
LDLC SERPL CALC-MCNC: 73 MG/DL (ref 0–100)
LYMPHOCYTES # BLD AUTO: 2.13 THOUSANDS/ÂΜL (ref 0.6–4.47)
LYMPHOCYTES NFR BLD AUTO: 26 % (ref 14–44)
MCH RBC QN AUTO: 27.5 PG (ref 26.8–34.3)
MCHC RBC AUTO-ENTMCNC: 32.4 G/DL (ref 31.4–37.4)
MCV RBC AUTO: 85 FL (ref 82–98)
MONOCYTES # BLD AUTO: 0.41 THOUSAND/ÂΜL (ref 0.17–1.22)
MONOCYTES NFR BLD AUTO: 5 % (ref 4–12)
NEUTROPHILS # BLD AUTO: 5.33 THOUSANDS/ÂΜL (ref 1.85–7.62)
NEUTS SEG NFR BLD AUTO: 65 % (ref 43–75)
NONHDLC SERPL-MCNC: 88 MG/DL
NRBC BLD AUTO-RTO: 0 /100 WBCS
PLATELET # BLD AUTO: 304 THOUSANDS/UL (ref 149–390)
PMV BLD AUTO: 10.1 FL (ref 8.9–12.7)
POTASSIUM SERPL-SCNC: 3.9 MMOL/L (ref 3.5–5.3)
PROT SERPL-MCNC: 7.4 G/DL (ref 6.4–8.4)
RBC # BLD AUTO: 4.88 MILLION/UL (ref 3.81–5.12)
SODIUM SERPL-SCNC: 139 MMOL/L (ref 135–147)
T3 SERPL-MCNC: 1.2 NG/ML
T4 FREE SERPL-MCNC: 0.74 NG/DL (ref 0.61–1.12)
TIBC SERPL-MCNC: 393 UG/DL (ref 250–450)
TRANSFERRIN SERPL-MCNC: 300 MG/DL (ref 203–362)
TRIGL SERPL-MCNC: 77 MG/DL
TSH SERPL DL<=0.05 MIU/L-ACNC: 1.44 UIU/ML (ref 0.45–4.5)
UIBC SERPL-MCNC: 325 UG/DL (ref 155–355)
VIT B12 SERPL-MCNC: 780 PG/ML (ref 180–914)
WBC # BLD AUTO: 8.19 THOUSAND/UL (ref 4.31–10.16)

## 2024-03-15 PROCEDURE — 72050 X-RAY EXAM NECK SPINE 4/5VWS: CPT

## 2024-03-15 PROCEDURE — 82746 ASSAY OF FOLIC ACID SERUM: CPT

## 2024-03-15 PROCEDURE — 80061 LIPID PANEL: CPT

## 2024-03-15 PROCEDURE — 84439 ASSAY OF FREE THYROXINE: CPT

## 2024-03-15 PROCEDURE — 36415 COLL VENOUS BLD VENIPUNCTURE: CPT

## 2024-03-15 PROCEDURE — 86141 C-REACTIVE PROTEIN HS: CPT

## 2024-03-15 PROCEDURE — 83550 IRON BINDING TEST: CPT

## 2024-03-15 PROCEDURE — 85652 RBC SED RATE AUTOMATED: CPT

## 2024-03-15 PROCEDURE — 84482 T3 REVERSE: CPT

## 2024-03-15 PROCEDURE — 84443 ASSAY THYROID STIM HORMONE: CPT

## 2024-03-15 PROCEDURE — 86376 MICROSOMAL ANTIBODY EACH: CPT

## 2024-03-15 PROCEDURE — 82607 VITAMIN B-12: CPT

## 2024-03-15 PROCEDURE — 84466 ASSAY OF TRANSFERRIN: CPT

## 2024-03-15 PROCEDURE — 80053 COMPREHEN METABOLIC PANEL: CPT

## 2024-03-15 PROCEDURE — 82306 VITAMIN D 25 HYDROXY: CPT

## 2024-03-15 PROCEDURE — 85025 COMPLETE CBC W/AUTO DIFF WBC: CPT

## 2024-03-15 PROCEDURE — 84480 ASSAY TRIIODOTHYRONINE (T3): CPT

## 2024-03-15 PROCEDURE — 82728 ASSAY OF FERRITIN: CPT

## 2024-03-15 PROCEDURE — 83036 HEMOGLOBIN GLYCOSYLATED A1C: CPT

## 2024-03-15 PROCEDURE — 83540 ASSAY OF IRON: CPT

## 2024-03-16 LAB — THYROPEROXIDASE AB SERPL-ACNC: <9 IU/ML (ref 0–34)

## 2024-03-20 LAB — T3REVERSE SERPL-MCNC: 19 NG/DL (ref 9.2–24.1)

## 2024-03-22 ENCOUNTER — CONSULT (OUTPATIENT)
Dept: RHEUMATOLOGY | Facility: CLINIC | Age: 54
End: 2024-03-22
Payer: COMMERCIAL

## 2024-03-22 ENCOUNTER — APPOINTMENT (OUTPATIENT)
Dept: LAB | Facility: CLINIC | Age: 54
End: 2024-03-22
Payer: COMMERCIAL

## 2024-03-22 VITALS
TEMPERATURE: 97.3 F | OXYGEN SATURATION: 97 % | HEIGHT: 62 IN | BODY MASS INDEX: 35.44 KG/M2 | SYSTOLIC BLOOD PRESSURE: 122 MMHG | WEIGHT: 192.6 LBS | DIASTOLIC BLOOD PRESSURE: 70 MMHG | HEART RATE: 79 BPM

## 2024-03-22 DIAGNOSIS — M15.9 OSTEOARTHRITIS OF MULTIPLE JOINTS, UNSPECIFIED OSTEOARTHRITIS TYPE: ICD-10-CM

## 2024-03-22 DIAGNOSIS — D64.9 ANEMIA, UNSPECIFIED TYPE: ICD-10-CM

## 2024-03-22 DIAGNOSIS — E78.5 HYPERLIPIDEMIA, UNSPECIFIED HYPERLIPIDEMIA TYPE: ICD-10-CM

## 2024-03-22 DIAGNOSIS — M79.7 FIBROMYALGIA: ICD-10-CM

## 2024-03-22 DIAGNOSIS — I51.9 MYXEDEMA HEART DISEASE: ICD-10-CM

## 2024-03-22 DIAGNOSIS — M79.7 FIBROMYALGIA: Primary | ICD-10-CM

## 2024-03-22 DIAGNOSIS — E56.9 MULTIPLE VITAMIN DEFICIENCY DISEASE: ICD-10-CM

## 2024-03-22 DIAGNOSIS — R53.83 OTHER FATIGUE: ICD-10-CM

## 2024-03-22 DIAGNOSIS — M54.2 CERVICALGIA: ICD-10-CM

## 2024-03-22 DIAGNOSIS — E03.9 MYXEDEMA HEART DISEASE: ICD-10-CM

## 2024-03-22 LAB
25(OH)D3 SERPL-MCNC: 28.5 NG/ML (ref 30–100)
CK SERPL-CCNC: 118 U/L (ref 26–192)
T3FREE SERPL-MCNC: 3.63 PG/ML (ref 2.5–3.9)
T4 SERPL-MCNC: 8.07 UG/DL (ref 6.09–12.23)

## 2024-03-22 PROCEDURE — 99204 OFFICE O/P NEW MOD 45 MIN: CPT | Performed by: STUDENT IN AN ORGANIZED HEALTH CARE EDUCATION/TRAINING PROGRAM

## 2024-03-22 PROCEDURE — 82550 ASSAY OF CK (CPK): CPT

## 2024-03-22 PROCEDURE — 84481 FREE ASSAY (FT-3): CPT

## 2024-03-22 PROCEDURE — 84436 ASSAY OF TOTAL THYROXINE: CPT

## 2024-03-22 PROCEDURE — 82306 VITAMIN D 25 HYDROXY: CPT

## 2024-03-22 PROCEDURE — 36415 COLL VENOUS BLD VENIPUNCTURE: CPT

## 2024-03-22 NOTE — PATIENT INSTRUCTIONS
Please get blood work, I will let you know if you need to come back and see Rheumatology    Please get shoulder x-rays    You and tour PCP can consider medications such as duloxetine, pregabalin, gabapentin, and amitriptyline for fibromyalgia

## 2024-03-22 NOTE — PROGRESS NOTES
"Rheumatology Outpatient Consult Note  3/22/2024       Juliaan Angel MD  [address] [facility]  [address]  [place] [address]    Reason for referral: Joint pain    Assessment: 53 y.o. female referred for the above.    My impressions and plan were communicated via  Joshua [id number]    Symptoms and past blood work most consistent with FMS; didn't have any significant elevation of inflammatory markers, no significant AM stiffness, pain reliably worse with use, all-over tenderness to palpation and subjective weakness with objectively preserved strength (specifically proximal muscle strength)    Will check a CK for completeness but even if mildly elevated would not be concerned for a myositis given objective strength    Will check VitD as well; had recently checked TSH WNL    Discussed FMS at length and treatment options, will let her PCP know    Provided with FMS information in Niuean for her convenience    Encounter Diagnosis     ICD-10-CM    1. Fibromyalgia  M79.7 Vitamin D 25 hydroxy     CK     Ambulatory referral to Physical Therapy      2. Osteoarthritis of multiple joints, unspecified osteoarthritis type  M15.9 Ambulatory Referral to Rheumatology     XR shoulder 2+ vw left     XR shoulder 2+ vw right          Plan:  -PCP can consider medications such as duloxetine, pregabalin, gabapentin, and amitriptyline for FMS if clinically feasible and not contraindicated  -If CK/roentgenograms abnormal and concerning for a rheum disease, will have her RTC, otherwise follow up with primary care, no Rheumatology follow-up needed for these symptoms    Mars Alvarenga DO, CCD    Mars Alvarenga DO, CCD  [facility]     History: Gris Luque is a(n) 53 y.o. female who is referred for the above. Known OA of spine    Translation provided by patient's daughter per patient preference    Everything feels \"tight\" and painful all over, in constant pain everywhere.    Has been going on for about 6 months. Was " tolerable before but is getting worse. Before just felt like fatigue, but now she is fatigued all the time. Takes about 4 or 5 min to get moving. She does also that it is worse since she has gone into menopause    Lots of cracking but no pain.    Skin is really dry. Is fairly new. All over.    Feels weak all over    Feels dizzy when the back of her neck is really tight    Eyes feel heavy and has to massage them but no dryness or pain or redness. Massaging helps.    Is on her feet all day, at the end of the work day things are much more painful and hurts to get back up    Does have COTTON with exertion, but also sometimes happens if she gets up to fast    Has some muscular pain in the back if she breathes in deeply    Does have gastritis and GERD, gets epigastric discomfort sometimes but not new or worse    Denies:  Fever  Rash  Oral/nasal ulcers  Muscle weakness in specific areas  Uveitis  Dactylitis  Dysphagia/odynophagia  CP  Pleurisy  Stomach pain  Hematochezia  Gross hematuria  Raynaud's  Joint issues other than noted above    Past Medical History:   Diagnosis Date    Abdominal pain     Anemia     Arthritis     Colon polyp     GERD (gastroesophageal reflux disease)     GI disease     History of Helicobacter pylori infection     Iron deficiency     Iron deficiency anemia     Pneumonia        Past Surgical History:   Procedure Laterality Date    CHOLECYSTECTOMY      COLONOSCOPY      MAMMO (HISTORICAL)  2018    TUBAL LIGATION      UPPER GASTROINTESTINAL ENDOSCOPY         Outpatient Medications Marked as Taking for the 3/22/24 encounter (Consult) with Mars Alvarenga, DO   Medication    MAGNESIUM PO    Omega-3 Fatty Acids (OMEGA 3 PO)       Allergies as of 03/22/2024 - Reviewed 03/22/2024   Allergen Reaction Noted    Aspirin Palpitations 11/08/2013       Family History   Problem Relation Age of Onset    Hyperlipidemia Mother     Hypertension Mother     Kidney disease Mother     No Known Problems Father     Kidney  "disease Sister     No Known Problems Sister     No Known Problems Sister     No Known Problems Sister     No Known Problems Sister     No Known Problems Sister     No Known Problems Sister     No Known Problems Daughter     No Known Problems Daughter     No Known Problems Daughter     No Known Problems Daughter     No Known Problems Maternal Grandmother     No Known Problems Maternal Grandfather     No Known Problems Paternal Grandmother     No Known Problems Paternal Grandfather     No Known Problems Son     No Known Problems Brother     No Known Problems Brother     No Known Problems Brother        Social History:  Social History     Tobacco Use    Smoking status: Never    Smokeless tobacco: Never   Vaping Use    Vaping status: Never Used   Substance Use Topics    Alcohol use: No    Drug use: No       Review of Systems: Pertinent findings documented in HPI    ___________________________________    Physical Exam:    /70 (BP Location: Right arm, Patient Position: Sitting, Cuff Size: Adult)   Pulse 79   Temp (!) 97.3 °F (36.3 °C) (Tympanic)   Ht 5' 2\" (1.575 m)   Wt 87.4 kg (192 lb 9.6 oz)   LMP 09/11/2023   SpO2 97%   BMI 35.23 kg/m²     General: Well appearing, in no distress.   Eyes: EOMI  HENT: No oral ulcers. MMM.   Extremities: Warm, well perfused, no edema.   Neuro: Alert and oriented.  Skin: No rashes.  Musculoskeletal exam: diffuse tenderness to palpation bilateral biceps, forearms, thighs, knees, calves, feet, traps, paraspinals  MS 5/5 bilateral shoulders abduction, adduction  ____________________________    Lab Result Review: relevant labs reviewed   Latest Reference Range & Units 05/15/22 08:08 03/15/24 11:04   Sed Rate 0 - 29 mm/hour 27 30 (H)   RHEUMATOID FACTOR Negative  Negative    Antinuclear Antibodies, IFA  Negative    (H): Data is abnormally high    My interpretation of her most recent ESR is that it is NOT particularly elevated for her age and sex    Imaging Result Review: relevant " images reviewed

## 2024-03-29 NOTE — RESULT ENCOUNTER NOTE
Mildly low VitD, CK WNL so will not need to see her back in clinic unless x-rays are abnormal. Will forward result to her PCP to manage VitD if necessary

## 2024-04-09 ENCOUNTER — HOSPITAL ENCOUNTER (OUTPATIENT)
Dept: RADIOLOGY | Facility: HOSPITAL | Age: 54
Discharge: HOME | End: 2024-04-09
Payer: COMMERCIAL

## 2024-04-09 DIAGNOSIS — M15.9 OSTEOARTHRITIS OF MULTIPLE JOINTS, UNSPECIFIED OSTEOARTHRITIS TYPE: ICD-10-CM

## 2024-04-09 DIAGNOSIS — M25.572 LEFT ANKLE PAIN, UNSPECIFIED CHRONICITY: ICD-10-CM

## 2024-04-09 PROCEDURE — 73610 X-RAY EXAM OF ANKLE: CPT

## 2024-12-18 ENCOUNTER — RA CDI HCC (OUTPATIENT)
Dept: OTHER | Facility: HOSPITAL | Age: 54
End: 2024-12-18

## 2024-12-27 PROBLEM — M79.7 FIBROMYALGIA: Status: ACTIVE | Noted: 2024-12-27

## 2025-03-21 ENCOUNTER — ANNUAL EXAM (OUTPATIENT)
Dept: OBGYN CLINIC | Facility: CLINIC | Age: 55
End: 2025-03-21
Payer: COMMERCIAL

## 2025-03-21 ENCOUNTER — HOSPITAL ENCOUNTER (OUTPATIENT)
Dept: ULTRASOUND IMAGING | Facility: HOSPITAL | Age: 55
Discharge: HOME | End: 2025-03-21
Payer: COMMERCIAL

## 2025-03-21 VITALS
BODY MASS INDEX: 34.04 KG/M2 | SYSTOLIC BLOOD PRESSURE: 120 MMHG | WEIGHT: 185 LBS | DIASTOLIC BLOOD PRESSURE: 80 MMHG | HEIGHT: 62 IN

## 2025-03-21 DIAGNOSIS — Z12.31 ENCOUNTER FOR SCREENING MAMMOGRAM FOR MALIGNANT NEOPLASM OF BREAST: ICD-10-CM

## 2025-03-21 DIAGNOSIS — Z12.11 SCREEN FOR COLON CANCER: ICD-10-CM

## 2025-03-21 DIAGNOSIS — Z01.419 WELL WOMAN EXAM WITH ROUTINE GYNECOLOGICAL EXAM: Primary | ICD-10-CM

## 2025-03-21 DIAGNOSIS — N95.0 POSTMENOPAUSAL BLEEDING: ICD-10-CM

## 2025-03-21 DIAGNOSIS — Z12.4 ENCOUNTER FOR SCREENING FOR MALIGNANT NEOPLASM OF CERVIX: ICD-10-CM

## 2025-03-21 DIAGNOSIS — N76.0 ACUTE VAGINITIS: ICD-10-CM

## 2025-03-21 DIAGNOSIS — Z11.51 SCREENING FOR HPV (HUMAN PAPILLOMAVIRUS): ICD-10-CM

## 2025-03-21 PROCEDURE — 87660 TRICHOMONAS VAGIN DIR PROBE: CPT

## 2025-03-21 PROCEDURE — G0476 HPV COMBO ASSAY CA SCREEN: HCPCS

## 2025-03-21 PROCEDURE — 76830 TRANSVAGINAL US NON-OB: CPT

## 2025-03-21 PROCEDURE — 87510 GARDNER VAG DNA DIR PROBE: CPT

## 2025-03-21 PROCEDURE — S0612 ANNUAL GYNECOLOGICAL EXAMINA: HCPCS

## 2025-03-21 PROCEDURE — 76856 US EXAM PELVIC COMPLETE: CPT

## 2025-03-21 PROCEDURE — G0145 SCR C/V CYTO,THINLAYER,RESCR: HCPCS

## 2025-03-21 PROCEDURE — 87480 CANDIDA DNA DIR PROBE: CPT

## 2025-03-21 NOTE — PROGRESS NOTES
ASSESSMENT & PLAN:   Diagnoses and all orders for this visit:    Well woman exam with routine gynecological exam  -     Liquid-based pap, screening  -     HPV High Risk    Encounter for screening for malignant neoplasm of cervix  -     Liquid-based pap, screening  -     HPV High Risk    Screening for HPV (human papillomavirus)  -     Liquid-based pap, screening  -     HPV High Risk    Encounter for screening mammogram for malignant neoplasm of breast  -     Mammo screening bilateral w 3d and cad; Future    Screen for colon cancer  -     Ambulatory Referral to Gastroenterology; Future    Postmenopausal bleeding  -     US pelvis complete w transvaginal; Future    Acute vaginitis  -     VAGINOSIS DNA PROBE      The following were reviewed in today's visit: ASCCP guidelines, Gardisil vaccination, STD testing breast self exam, mammography screening ordered, STD testing, exercise, and healthy diet.    Patient to return to office in yearly for annual exam.     All questions have been answered to her satisfaction.    CC:  Annual Gynecologic Examination  Chief Complaint   Patient presents with    Gynecologic Exam     The patient is here for a yearly exam. Neg pap/neg HPV 10/7/21  Mammo 2024 Negative- Next one scheduled on 2025     Colonoscopy 21 - repeat 5 years    The patient did not have her period for a year. She started having heavy bleeding last month. The bleeding lasted six days. She had heavy cramping and lower back pain.     No vaginal, bowel, or bladder issues. The patient has breast pain on her side on and off on the left breast. No breast lumps. No nipple redness or discharge.     No hot       HPI: Gris CeesCedyta is a 54 y.o.  who presents for annual gynecologic examination.  She has the following concerns: The patient did not have her period for since 10/2023. She started having heavy vaginal bleeding last month. The bleeding lasted six days, 4 days of heavy bleeding and then  "lightened. She also experienced strong cramping and lower back pain during bleeding. She notes that bleeding and symptoms were similar to what they used to be like during menses.   She c/o increased white/yellow vaginal discharge.     Mammogram scheduled for 3/31/2025    She did have an EMB in 2021 for menorrhagia and frequent menses q 2-3 weeks.   A. Endometrium, biopsy:  -  Benign weakly proliferative phase endometrium, negative for hyperplasia or carcinoma.  -  Immunohistochemical stain performed with appropriate controls for MUM1 highlights scattered activated B-cells and very rare plasma cells, compatible with a component of chronic endometritis.    US pelvis 10/12/2023:   \"FINDINGS:  UTERUS:  The uterus is retroflexed in position, measuring 9.8 x 8.7 x 5.3 cm.  Heterogeneous myometrial echotexture noted containing multiple heterogeneous uterine mass lesions. The 2 largest lesions measure 6.0 x 5.5 x 5.4 cm and 2.5 x 2.4 x 2.1 cm, not significantly changed compared to the prior exam likely representing   leiomyomata..  The cervix appears within normal limits.     ENDOMETRIUM:  The endometrial echo complex has an AP caliber of 4.0 mm.  Its appearance is within normal limits for age and cycle and shows no filling defects.     OVARIES/ADNEXA:  Right ovary:  2.9 x 1.8 x 1.9 cm. 5.3 mL.  Left ovary: Not visualized/evaluated  Normal right ovarian Doppler flow.  No suspicious ovarian or adnexal abnormality.     OTHER:  No free fluid or loculated fluid collections.     IMPRESSION:     Enlarged heterogeneous uterus containing several mass lesions likely representing leiomyomata. Appearance not significantly changed compared to the prior exam. Unremarkable sonographic appearance of the right ovary with normal Doppler flow. The left   ovary is not visualized on the images of this exam. No significant free pelvic fluid or loculated fluid collections.\"    Health Maintenance:    Exercise: intermittently  Breast exams/breast " awareness: yes  Last mammogram: 2/13/2024, BIRADS1  Colorectal cancer screening: colonoscopy 7/27/2021, repeat 5 yrs     Past Medical History:   Diagnosis Date    Abdominal pain     Anemia     Arthritis     Breast cyst     Colon polyp     GERD (gastroesophageal reflux disease)     GI disease     History of Helicobacter pylori infection     Iron deficiency     Iron deficiency anemia     Pneumonia      Past Surgical History:   Procedure Laterality Date    CHOLECYSTECTOMY      COLONOSCOPY      MAMMO (HISTORICAL)  2018    TUBAL LIGATION      UPPER GASTROINTESTINAL ENDOSCOPY       Past OB/Gyn History:   Patient's last menstrual period was 09/11/2023.    Menopausal status: postmenopausal  Menopausal symptoms: none    Last Pap: 10/7/2021 : no abnormalities  History of abnormal Pap smear: no    Patient is currently sexually active.   STD testing: no  Current contraception: post menopausal status    Family History  Family History   Problem Relation Age of Onset    Hyperlipidemia Mother     Hypertension Mother     Kidney disease Mother     Diabetes Mother     No Known Problems Father     Kidney disease Sister     No Known Problems Sister     No Known Problems Sister     No Known Problems Sister     No Known Problems Sister     No Known Problems Sister     No Known Problems Sister     No Known Problems Daughter     No Known Problems Daughter     No Known Problems Daughter     No Known Problems Daughter     No Known Problems Maternal Grandmother     No Known Problems Maternal Grandfather     No Known Problems Paternal Grandmother     No Known Problems Paternal Grandfather     No Known Problems Son     No Known Problems Brother     No Known Problems Brother     No Known Problems Brother      Family history of uterine or ovarian cancer: no  Family history of breast cancer: no  Family history of colon cancer: no    Social History:  Social History     Socioeconomic History    Marital status: /Civil Union     Spouse name: Not  on file    Number of children: 5    Years of education: 2 year college    Highest education level: Not on file   Occupational History    Not on file   Tobacco Use    Smoking status: Never    Smokeless tobacco: Never   Vaping Use    Vaping status: Never Used   Substance and Sexual Activity    Alcohol use: No    Drug use: No    Sexual activity: Not Currently     Partners: Male     Birth control/protection: Female Sterilization   Other Topics Concern    Not on file   Social History Narrative    ** Merged History Encounter **    · Most recent tobacco use screenin2019      · Do you currently or have you served in the [employer]:   No      · Were you activated, into active duty, as a member of the [employer] or as a Reservist:   No      · General stress level:   High      · Exercise level:   None      · Diet:   Regular      · Marital status:         · Sexual orientation:   Heterosexual      · Alcohol intake:   None      · Live alone or with others:   with others      · Caffeine intake:   Occasional      · Presence of domestic violence:   No      · Seat belts used routinely:   Yes      · Sunscreen used routinely:   No      · Do you feel safe at home:   Yes               Social Drivers of Health     Financial Resource Strain: Not on file   Food Insecurity: Not on file   Transportation Needs: Not on file   Physical Activity: Not on file   Stress: Not on file   Social Connections: Not on file   Intimate Partner Violence: Not on file   Housing Stability: Not on file     Domestic violence screen: negative    Allergies:  Allergies   Allergen Reactions    Aspirin Palpitations       Medications:    Current Outpatient Medications:     gabapentin (NEURONTIN) 300 mg capsule, Take 1 capsule by mouth twice daily (Patient not taking: Reported on 3/21/2025), Disp: 60 capsule, Rfl: 5    MAGNESIUM PO, Take 30 mg by mouth in the morning (Patient not taking: Reported on 3/21/2025), Disp: , Rfl:     meclizine  "(ANTIVERT) 12.5 MG tablet, Take 1 tablet (12.5 mg total) by mouth 3 (three) times a day as needed for dizziness (Patient not taking: Reported on 3/21/2025), Disp: 30 tablet, Rfl: 3    Omega-3 Fatty Acids (OMEGA 3 PO), Take by mouth daily (Patient not taking: Reported on 3/21/2025), Disp: , Rfl:     Review of Systems:  Review of Systems   Constitutional:  Negative for chills and fever.   Respiratory:  Negative for shortness of breath.    Cardiovascular:  Negative for chest pain.   Gastrointestinal:  Negative for abdominal pain, constipation and diarrhea.   Genitourinary:  Positive for vaginal bleeding. Negative for dysuria, frequency, pelvic pain, vaginal discharge and vaginal pain.   Psychiatric/Behavioral:  Negative for self-injury and suicidal ideas.          Physical Exam:  /80   Ht 5' 2\" (1.575 m)   Wt 83.9 kg (185 lb)   LMP 09/11/2023   BMI 33.84 kg/m²    Physical Exam  Constitutional:       Appearance: Normal appearance.   Genitourinary:      Vulva and urethral meatus normal.      No labial fusion noted.      No vaginal erythema or tenderness.      Mild vaginal atrophy present.       Right Adnexa: not tender.     Left Adnexa: not tender.     No cervical discharge or friability.      Uterus is not tender.   Breasts:     Breasts are symmetrical.      Right: Normal.      Left: Normal.   HENT:      Head: Normocephalic and atraumatic.   Neck:      Thyroid: No thyroid mass or thyroid tenderness.   Cardiovascular:      Rate and Rhythm: Normal rate and regular rhythm.      Heart sounds: Normal heart sounds. No murmur heard.  Pulmonary:      Effort: Pulmonary effort is normal.      Breath sounds: Normal breath sounds. No wheezing.   Abdominal:      Palpations: Abdomen is soft. There is no mass.      Tenderness: There is no abdominal tenderness.   Lymphadenopathy:      Cervical: No cervical adenopathy.   Neurological:      General: No focal deficit present.      Mental Status: She is alert and oriented to " person, place, and time.   Skin:     General: Skin is warm and dry.   Psychiatric:         Mood and Affect: Mood normal.         Behavior: Behavior normal.   Vitals and nursing note reviewed.

## 2025-03-22 LAB
CANDIDA RRNA VAG QL PROBE: NOT DETECTED
G VAGINALIS RRNA GENITAL QL PROBE: NOT DETECTED
T VAGINALIS RRNA GENITAL QL PROBE: NOT DETECTED

## 2025-03-24 ENCOUNTER — RESULTS FOLLOW-UP (OUTPATIENT)
Dept: OBGYN CLINIC | Facility: CLINIC | Age: 55
End: 2025-03-24

## 2025-03-27 LAB
LAB AP GYN PRIMARY INTERPRETATION: NORMAL
Lab: NORMAL

## 2025-03-31 ENCOUNTER — HOSPITAL ENCOUNTER (OUTPATIENT)
Facility: HOSPITAL | Age: 55
Discharge: HOME | End: 2025-03-31
Attending: FAMILY MEDICINE
Payer: COMMERCIAL

## 2025-03-31 VITALS — BODY MASS INDEX: 34.04 KG/M2 | WEIGHT: 185 LBS | HEIGHT: 62 IN

## 2025-03-31 DIAGNOSIS — Z12.31 BREAST CANCER SCREENING BY MAMMOGRAM: ICD-10-CM

## 2025-03-31 PROCEDURE — 77063 BREAST TOMOSYNTHESIS BI: CPT

## 2025-03-31 PROCEDURE — 77067 SCR MAMMO BI INCL CAD: CPT

## 2025-04-01 ENCOUNTER — RESULTS FOLLOW-UP (OUTPATIENT)
Dept: FAMILY MEDICINE CLINIC | Facility: CLINIC | Age: 55
End: 2025-04-01

## 2025-04-09 ENCOUNTER — PROCEDURE VISIT (OUTPATIENT)
Dept: OBGYN CLINIC | Facility: CLINIC | Age: 55
End: 2025-04-09
Payer: COMMERCIAL

## 2025-04-09 VITALS
SYSTOLIC BLOOD PRESSURE: 122 MMHG | HEIGHT: 62 IN | WEIGHT: 186.6 LBS | DIASTOLIC BLOOD PRESSURE: 72 MMHG | BODY MASS INDEX: 34.34 KG/M2

## 2025-04-09 DIAGNOSIS — N89.8 VAGINAL DISCHARGE: ICD-10-CM

## 2025-04-09 DIAGNOSIS — N95.0 POSTMENOPAUSAL BLEEDING: Primary | ICD-10-CM

## 2025-04-09 PROCEDURE — 87660 TRICHOMONAS VAGIN DIR PROBE: CPT | Performed by: OBSTETRICS & GYNECOLOGY

## 2025-04-09 PROCEDURE — 87510 GARDNER VAG DNA DIR PROBE: CPT | Performed by: OBSTETRICS & GYNECOLOGY

## 2025-04-09 PROCEDURE — 99213 OFFICE O/P EST LOW 20 MIN: CPT | Performed by: OBSTETRICS & GYNECOLOGY

## 2025-04-09 PROCEDURE — 87480 CANDIDA DNA DIR PROBE: CPT | Performed by: OBSTETRICS & GYNECOLOGY

## 2025-04-09 PROCEDURE — 58100 BIOPSY OF UTERUS LINING: CPT | Performed by: OBSTETRICS & GYNECOLOGY

## 2025-04-09 PROCEDURE — 88305 TISSUE EXAM BY PATHOLOGIST: CPT | Performed by: PATHOLOGY

## 2025-04-09 NOTE — PROGRESS NOTES
Assessment/Plan    Diagnoses and all orders for this visit:    Postmenopausal bleeding  -     Tissue Exam  -     Endometrial biopsy    Vaginal discharge  -     VAGINOSIS DNA PROBE    Other orders  -     Cancel: Biopsy        Subjective  Chief Complaint   Patient presents with    Procedure     Patient presents for her endometrial biopsy          Gris Luque is a 54 y.o.  female here for a problem visit.  She is here for an endometrial biopsy in the setting of postmenopausal bleeding, but she also reports issues with discharge lately. She reports a foul smelling clear discharge in the past few weeks. Denies any itching or dysuria.    Patient Active Problem List   Diagnosis    Tension-type headache, not intractable    GERD (gastroesophageal reflux disease)    History of colon polyps    Chronic fatigue    Annual physical exam    Pain in joint, multiple sites    Fatty liver    Chronic midline thoracic back pain    Chronic bilateral low back pain with bilateral sciatica    Constipation    Equinus deformity of both feet    Fibroid, uterine    Plantar fasciitis of left foot    FRANKLIN (stress urinary incontinence, female)    Neck and shoulder pain    Benign paroxysmal positional vertigo due to bilateral vestibular disorder    Fibromyalgia         Gynecologic History  Patient's last menstrual period was 2023.    Obstetric History  OB History    Para Term  AB Living   5 5 5   5   SAB IAB Ectopic Multiple Live Births       5      # Outcome Date GA Lbr Silas/2nd Weight Sex Type Anes PTL Lv   5 Term      Vag-Spont   JONATHAN   4 Term      Vag-Spont   JONATHAN   3 Term      Vag-Spont   JONATHAN   2 Term      Vag-Spont   JONATHAN   1 Term      Vag-Spont   JONATHAN       Allergies  Aspirin    Medications    Current Outpatient Medications:     gabapentin (NEURONTIN) 300 mg capsule, Take 1 capsule by mouth twice daily (Patient not taking: Reported on 3/22/2024), Disp: 60 capsule, Rfl: 5    MAGNESIUM PO, Take 30 mg by mouth in the  "morning (Patient not taking: Reported on 3/21/2025), Disp: , Rfl:     meclizine (ANTIVERT) 12.5 MG tablet, Take 1 tablet (12.5 mg total) by mouth 3 (three) times a day as needed for dizziness (Patient not taking: Reported on 3/22/2024), Disp: 30 tablet, Rfl: 3    Omega-3 Fatty Acids (OMEGA 3 PO), Take by mouth daily (Patient not taking: Reported on 3/21/2025), Disp: , Rfl:       Review of Systems  Review of Systems       Objective     /72 (BP Location: Left arm, Patient Position: Sitting, Cuff Size: Standard)   Ht 5' 2\" (1.575 m)   Wt 84.6 kg (186 lb 9.6 oz)   LMP 09/11/2023   Breastfeeding No   BMI 34.13 kg/m²       Physical Exam  Constitutional:       Appearance: Normal appearance.   Genitourinary:      No lesions in the vagina.      No vaginal discharge or bleeding.   HENT:      Head: Normocephalic and atraumatic.   Eyes:      Extraocular Movements: Extraocular movements intact.   Pulmonary:      Effort: Pulmonary effort is normal.   Musculoskeletal:         General: Normal range of motion.   Neurological:      Mental Status: She is alert. Mental status is at baseline.   Psychiatric:         Mood and Affect: Mood normal.         Behavior: Behavior normal.              "

## 2025-04-09 NOTE — PROGRESS NOTES
Endometrial biopsy    Date/Time: 4/9/2025 10:45 AM    Performed by: Breanna Odonnell MD  Authorized by: Breanna Odonnell MD  Universal Protocol:  Consent: Written consent obtained.  Risks and benefits: risks, benefits and alternatives were discussed  Consent given by: patient  Patient understanding: patient states understanding of the procedure being performed  Patient consent: the patient's understanding of the procedure matches consent given  Procedure consent: procedure consent matches procedure scheduled  Required items: required blood products, implants, devices, and special equipment available  Patient identity confirmed: verbally with patient    Indication:     Indications: Post-menopausal bleeding      Chronicity of post-menopausal bleeding:  New    Progression of post-menopausal bleeding:  Resolved  Procedure:     Procedure: endometrial biopsy with Pipelle      A bivalve speculum was placed in the vagina: yes      Cervix cleaned and prepped: yes      Uterus sounded: yes      Uterus sound depth (cm):  8    Specimen collected: specimen collected and sent to pathology      Patient tolerated procedure well with no complications: yes    Findings:     Uterus size:  Non-gravid    Cervix: normal

## 2025-04-10 ENCOUNTER — RESULTS FOLLOW-UP (OUTPATIENT)
Dept: OBGYN CLINIC | Facility: CLINIC | Age: 55
End: 2025-04-10

## 2025-04-15 PROCEDURE — 88305 TISSUE EXAM BY PATHOLOGIST: CPT | Performed by: PATHOLOGY

## 2025-04-24 ENCOUNTER — RA CDI HCC (OUTPATIENT)
Dept: OTHER | Facility: HOSPITAL | Age: 55
End: 2025-04-24

## 2025-04-25 PROBLEM — E66.9 OBESITY (BMI 30-39.9): Status: ACTIVE | Noted: 2025-04-25

## 2025-06-02 NOTE — ASSESSMENT & PLAN NOTE
Check routine labs      Orders:    CBC and differential; Future    Comprehensive metabolic panel; Future    Lipid panel; Future    TSH, 3rd generation; Future    Urinalysis with microscopic

## 2025-06-02 NOTE — PROGRESS NOTES
Name: Gris Luque      : 1970      MRN: [id number]  Encounter Provider: Juliana Angel MD  Encounter Date: 2025   Encounter department: [facility]  :  Assessment & Plan  Annual physical exam  Check routine labs      Orders:    CBC and differential; Future    Comprehensive metabolic panel; Future    Lipid panel; Future    TSH, 3rd generation; Future    Urinalysis with microscopic    Encounter for immunization    Orders:    Zoster Vaccine Recombinant IM    History of colon polyps  UTD colonoscopy           Obesity (BMI 30-39.9)  Discussion on diet and exercise guidelines for weight loss and  health reviewed with pt            Benign paroxysmal positional vertigo due to bilateral vestibular disorder  Meclizine prn         Chronic pain of both knees  Left moderate to severe and right mild to moderate on xray  pt saw ortho [facility] second opinion    Orders:    Ambulatory Referral to Orthopedic Surgery; Future    Chronic bilateral low back pain with bilateral sciatica  Worse with prolonged standing          Plantar fasciitis, bilateral    Orders:    Ambulatory Referral to Podiatry; Future    Gastroesophageal reflux disease without esophagitis  Otc prn                   History of Present Illness   Patient here for annual physical Pt is here for interval visit and evaluation of multiple medical problems, review of medications, labs, Health Maintenance and any recent specialty consults          Review of Systems   Constitutional:  Negative for appetite change, chills, fatigue and fever.   Respiratory:  Negative for cough, chest tightness and shortness of breath.    Cardiovascular:  Negative for chest pain, palpitations and leg swelling.   Gastrointestinal:  Negative for abdominal pain, constipation, diarrhea, nausea and vomiting.   Genitourinary:  Negative for difficulty urinating and frequency.   Musculoskeletal:  Positive for arthralgias (knee pain).  "Negative for back pain, gait problem and neck pain.        Bilateral foot pain   Skin:  Negative for rash.   Neurological:  Negative for dizziness, weakness, light-headedness, numbness and headaches.   Hematological:  Does not bruise/bleed easily.   Psychiatric/Behavioral:  Negative for dysphoric mood and sleep disturbance. The patient is not nervous/anxious.        Objective   /84   Pulse 92   Temp 98.3 °F (36.8 °C) (Tympanic)   Resp 16   Ht 5' 2\" (1.575 m)   Wt 87.1 kg (192 lb)   LMP 09/11/2023   SpO2 98%   BMI 35.12 kg/m²      Physical Exam  Vitals and nursing note reviewed.   Constitutional:       General: She is not in acute distress.     Appearance: Normal appearance. She is well-developed. She is obese.   HENT:      Head: Normocephalic and atraumatic.      Right Ear: Tympanic membrane, ear canal and external ear normal.      Left Ear: Tympanic membrane, ear canal and external ear normal.      Nose: Nose normal.      Mouth/Throat:      Mouth: Mucous membranes are moist.      Pharynx: Oropharynx is clear. No oropharyngeal exudate or posterior oropharyngeal erythema.     Eyes:      Extraocular Movements: Extraocular movements intact.      Conjunctiva/sclera: Conjunctivae normal.      Pupils: Pupils are equal, round, and reactive to light.       Cardiovascular:      Rate and Rhythm: Normal rate and regular rhythm.      Pulses: Normal pulses.      Heart sounds: Normal heart sounds. No murmur heard.  Pulmonary:      Effort: Pulmonary effort is normal. No respiratory distress.      Breath sounds: Normal breath sounds. No wheezing or rales.   Abdominal:      General: Bowel sounds are normal. There is no distension.      Palpations: Abdomen is soft. There is no mass.      Tenderness: There is no abdominal tenderness. There is no right CVA tenderness, left CVA tenderness, guarding or rebound.      Hernia: No hernia is present.     Musculoskeletal:         General: Swelling (both knees; tender right heel " to arch) and tenderness (bialteral plantar fascia) present. Normal range of motion.      Cervical back: Normal range of motion and neck supple.      Right lower leg: No edema.      Left lower leg: No edema.     Skin:     General: Skin is warm and dry.      Capillary Refill: Capillary refill takes less than 2 seconds.      Findings: No rash.      Comments: No abn  moles seen     Neurological:      General: No focal deficit present.      Mental Status: She is alert and oriented to person, place, and time.      Cranial Nerves: No cranial nerve deficit.      Sensory: No sensory deficit.      Motor: No weakness.      Coordination: Coordination normal.      Gait: Gait normal.      Deep Tendon Reflexes: Reflexes normal.     Psychiatric:         Mood and Affect: Mood normal.         Behavior: Behavior normal.

## 2025-06-06 ENCOUNTER — OFFICE VISIT (OUTPATIENT)
Dept: FAMILY MEDICINE CLINIC | Facility: CLINIC | Age: 55
End: 2025-06-06
Payer: COMMERCIAL

## 2025-06-06 VITALS
SYSTOLIC BLOOD PRESSURE: 134 MMHG | DIASTOLIC BLOOD PRESSURE: 84 MMHG | HEIGHT: 62 IN | BODY MASS INDEX: 35.33 KG/M2 | WEIGHT: 192 LBS | HEART RATE: 92 BPM | TEMPERATURE: 98.3 F | RESPIRATION RATE: 16 BRPM | OXYGEN SATURATION: 98 %

## 2025-06-06 DIAGNOSIS — Z23 ENCOUNTER FOR IMMUNIZATION: ICD-10-CM

## 2025-06-06 DIAGNOSIS — M25.561 CHRONIC PAIN OF BOTH KNEES: ICD-10-CM

## 2025-06-06 DIAGNOSIS — H81.13 BENIGN PAROXYSMAL POSITIONAL VERTIGO DUE TO BILATERAL VESTIBULAR DISORDER: ICD-10-CM

## 2025-06-06 DIAGNOSIS — K21.9 GASTROESOPHAGEAL REFLUX DISEASE WITHOUT ESOPHAGITIS: ICD-10-CM

## 2025-06-06 DIAGNOSIS — M25.562 CHRONIC PAIN OF BOTH KNEES: ICD-10-CM

## 2025-06-06 DIAGNOSIS — M54.42 CHRONIC BILATERAL LOW BACK PAIN WITH BILATERAL SCIATICA: ICD-10-CM

## 2025-06-06 DIAGNOSIS — M54.41 CHRONIC BILATERAL LOW BACK PAIN WITH BILATERAL SCIATICA: ICD-10-CM

## 2025-06-06 DIAGNOSIS — M72.2 PLANTAR FASCIITIS, BILATERAL: ICD-10-CM

## 2025-06-06 DIAGNOSIS — Z86.0100 HISTORY OF COLON POLYPS: ICD-10-CM

## 2025-06-06 DIAGNOSIS — G89.29 CHRONIC PAIN OF BOTH KNEES: ICD-10-CM

## 2025-06-06 DIAGNOSIS — G89.29 CHRONIC BILATERAL LOW BACK PAIN WITH BILATERAL SCIATICA: ICD-10-CM

## 2025-06-06 DIAGNOSIS — Z00.00 ANNUAL PHYSICAL EXAM: Primary | ICD-10-CM

## 2025-06-06 DIAGNOSIS — E66.9 OBESITY (BMI 30-39.9): ICD-10-CM

## 2025-06-06 PROCEDURE — 90750 HZV VACC RECOMBINANT IM: CPT | Performed by: FAMILY MEDICINE

## 2025-06-06 PROCEDURE — 90471 IMMUNIZATION ADMIN: CPT | Performed by: FAMILY MEDICINE

## 2025-06-06 PROCEDURE — 99396 PREV VISIT EST AGE 40-64: CPT | Performed by: FAMILY MEDICINE

## 2025-06-06 PROCEDURE — 99214 OFFICE O/P EST MOD 30 MIN: CPT | Performed by: FAMILY MEDICINE

## 2025-06-06 NOTE — ASSESSMENT & PLAN NOTE
Left moderate to severe and right mild to moderate on xray 11/24 pt saw ortho [facility] second opinion    Orders:    Ambulatory Referral to Orthopedic Surgery; Future

## 2025-06-13 ENCOUNTER — OFFICE VISIT (OUTPATIENT)
Dept: OBGYN CLINIC | Facility: CLINIC | Age: 55
End: 2025-06-13
Attending: FAMILY MEDICINE
Payer: COMMERCIAL

## 2025-06-13 ENCOUNTER — APPOINTMENT (OUTPATIENT)
Dept: RADIOLOGY | Facility: AMBULARY SURGERY CENTER | Age: 55
End: 2025-06-13
Attending: ORTHOPAEDIC SURGERY
Payer: COMMERCIAL

## 2025-06-13 VITALS — BODY MASS INDEX: 35.33 KG/M2 | HEIGHT: 62 IN | WEIGHT: 192 LBS

## 2025-06-13 DIAGNOSIS — M17.0 BILATERAL PRIMARY OSTEOARTHRITIS OF KNEE: ICD-10-CM

## 2025-06-13 DIAGNOSIS — M17.0 OSTEOARTHRITIS OF PATELLOFEMORAL JOINTS OF BOTH KNEES: ICD-10-CM

## 2025-06-13 DIAGNOSIS — M25.562 LEFT KNEE PAIN, UNSPECIFIED CHRONICITY: ICD-10-CM

## 2025-06-13 DIAGNOSIS — M25.561 CHRONIC PAIN OF RIGHT KNEE: ICD-10-CM

## 2025-06-13 DIAGNOSIS — G89.29 CHRONIC PAIN OF RIGHT KNEE: ICD-10-CM

## 2025-06-13 DIAGNOSIS — M25.561 RIGHT KNEE PAIN, UNSPECIFIED CHRONICITY: ICD-10-CM

## 2025-06-13 DIAGNOSIS — M25.562 CHRONIC PAIN OF LEFT KNEE: Primary | ICD-10-CM

## 2025-06-13 DIAGNOSIS — G89.29 CHRONIC PAIN OF LEFT KNEE: Primary | ICD-10-CM

## 2025-06-13 PROCEDURE — 99204 OFFICE O/P NEW MOD 45 MIN: CPT | Performed by: ORTHOPAEDIC SURGERY

## 2025-06-13 PROCEDURE — 20610 DRAIN/INJ JOINT/BURSA W/O US: CPT | Performed by: ORTHOPAEDIC SURGERY

## 2025-06-13 PROCEDURE — 73562 X-RAY EXAM OF KNEE 3: CPT

## 2025-06-13 RX ORDER — TRIAMCINOLONE ACETONIDE 40 MG/ML
80 INJECTION, SUSPENSION INTRA-ARTICULAR; INTRAMUSCULAR
Status: COMPLETED | OUTPATIENT
Start: 2025-06-13 | End: 2025-06-13

## 2025-06-13 RX ORDER — BUPIVACAINE HYDROCHLORIDE 2.5 MG/ML
2 INJECTION, SOLUTION INFILTRATION; PERINEURAL
Status: COMPLETED | OUTPATIENT
Start: 2025-06-13 | End: 2025-06-13

## 2025-06-13 RX ORDER — METHYLPREDNISOLONE 4 MG/1
TABLET ORAL
Qty: 21 EACH | Refills: 0 | Status: SHIPPED | OUTPATIENT
Start: 2025-06-13

## 2025-06-13 RX ADMIN — BUPIVACAINE HYDROCHLORIDE 2 ML: 2.5 INJECTION, SOLUTION INFILTRATION; PERINEURAL at 14:45

## 2025-06-13 RX ADMIN — TRIAMCINOLONE ACETONIDE 80 MG: 40 INJECTION, SUSPENSION INTRA-ARTICULAR; INTRAMUSCULAR at 14:45

## 2025-06-13 NOTE — PROGRESS NOTES
Name: Gris Luque      : 1970      MRN: [id number]  Encounter Provider: Shaggy Mackay DO  Encounter Date: 2025   Encounter department: [facility]  Assessment & Plan  Chronic pain of left knee  Chronic pain of right knee  Osteoarthritis of patellofemoral joints of both knees  Bilateral primary osteoarthritis of knee  Physical exam, diagnostic imaging, and subjective history are all most consistent with the above diagnosis. The pathoanatomy and natural history of this diagnosis was explained to the patient in detail.   X-ray obtained today and reviewed with the patient demonstrating moderate patellofemoral and mild medial and lateral compartment osteoarthritis  Patient was offered, accepted, and received a cortisone injection of the bilateral knee today. Patient tolerated procedure well with no immediate complications. Post-injection protocols and expectations were discussed with the patient.   Patient may call for VISCO as desired  Order placed today for Medrol Dose Shorty to be taken as prescribed if needed. Risks and side effects discussed today.   Apply Voltaren gel to painful area up to 4 times a day  May use ice or heat as needed for pain relief  May take NSAIDs/Tylenol as needed for pain control  Follow up after 8/15/25     Orders:    XR knee 3 vw left non injury; Future    XR knee 3 vw right non injury; Future    Large joint arthrocentesis: bilateral knee    Medial  Knee Brace          Subjective:  Gris Luque is a 54 y.o. female who presents today for evaluation and treatment of right greater than left chronic knee pain ongoing for many years. Pain is aggravated by ascending and descending stairs, getting up from a chair or car. Pain is mildly relieved with Tylenol. She notes clicking and cracking of her knees with any motion. She received right knee cortisone injectino on 2024 and right knee Durolane injection on 25. She finds  minimal relief with oral medication. She ambulates independently. She does not use a brace.     The following portions of the patient's history were reviewed and updated as appropriate: allergies, current medications, past family history, past social history, past surgical history and problem list.      Social History     Socioeconomic History    Marital status: /Civil Union     Spouse name: Not on file    Number of children: 5    Years of education: 2 year college    Highest education level: Not on file   Occupational History    Not on file   Tobacco Use    Smoking status: Never    Smokeless tobacco: Never   Vaping Use    Vaping status: Never Used   Substance and Sexual Activity    Alcohol use: Never    Drug use: No    Sexual activity: Not Currently     Partners: Male     Birth control/protection: Female Sterilization   Other Topics Concern    Not on file   Social History Narrative    ** Merged History Encounter **    · Most recent tobacco use screenin2019      · Do you currently or have you served in the [employer]:   No      · Were you activated, into active duty, as a member of the [employer] or as a Reservist:   No      · General stress level:   High      · Exercise level:   None      · Diet:   Regular      · Marital status:         · Sexual orientation:   Heterosexual      · Alcohol intake:   None      · Live alone or with others:   with others      · Caffeine intake:   Occasional      · Presence of domestic violence:   No      · Seat belts used routinely:   Yes      · Sunscreen used routinely:   No      · Do you feel safe at home:   Yes               Social Drivers of Health     Financial Resource Strain: Not on file   Food Insecurity: Not on file   Transportation Needs: Not on file   Physical Activity: Not on file   Stress: Not on file   Social Connections: Not on file   Intimate Partner Violence: Not on file   Housing Stability: Not on file     Past Medical History[1]  Past  "Surgical History[2]  Allergies[3]  Medications Ordered Prior to Encounter[4]         Objective:    Review of Systems  Pertinent items are noted in HPI.  All other systems were reviewed and are negative.    Physical Exam  Cons: Appears well.  No apparent distress.  Psych: Alert. Oriented x3.  Mood and affect normal.  Eyes: PERRLA, EOMI  Resp: Normal effort.  No audible wheezing or stridor.  CV: Palpable pulse.  No discernable arrhythmia.  No LE edema.  Lymph:  No palpable cervical, axillary, or inguinal lymphadenopathy.  Skin: Warm.  No palpable masses.  No visible lesions.  Neuro: Normal muscle tone.  Normal and symmetric DTR's.    BMI:   Estimated body mass index is 35.12 kg/m² as calculated from the following:    Height as of this encounter: 5' 2\" (1.575 m).    Weight as of this encounter: 87.1 kg (192 lb).  Lab Results   Component Value Date    HGBA1C 6.0 (H) 03/15/2024         Right Knee Exam     Tenderness   The patient is experiencing tenderness in the medial joint line and patella.    Range of Motion   Extension:  0   Flexion:  120     Other   Erythema: absent  Scars: absent  Sensation: normal  Pulse: present  Swelling: none  Effusion: no effusion present    Comments:    Knee stable at 0, 30, 90 degrees  + Patellar grind  + peripatellar crepitation  Skin intact and well perfused  Sensation intact in DP/SP/Mcknight/Sa/T nerve distributions  2+ DP & PT pulses  Brisk capillary refill in all toes        Left Knee Exam     Tenderness   The patient is experiencing tenderness in the medial joint line and patella.    Range of Motion   Extension:  0   Flexion:  120     Other   Erythema: absent  Scars: absent  Sensation: normal  Pulse: present  Swelling: none  Effusion: no effusion present    Comments:    Knee stable at 0, 30, 90 degrees  + Patellar grind  + peripatellar crepitation  Skin intact and well perfused  Sensation intact in DP/SP/Mcknight/Sa/T nerve distributions  2+ DP & PT pulses  Brisk capillary refill in all " "toes              Procedures  Large joint arthrocentesis: bilateral knee    Performed by: Shaggy Mackay DO  Authorized by: Shaggy Mackay DO    Universal Protocol:  Consent: Verbal consent obtained  Risks and benefits: risks, benefits and alternatives were discussed  Consent given by: patient  Time out: Immediately prior to procedure a \"time out\" was called to verify the correct patient, procedure, equipment, support staff and site/side marked as required.  Patient understanding: patient states understanding of the procedure being performed  Site marked: the operative site was marked  Patient identity confirmed: verbally with patient  Supporting Documentation  Indications: pain and diagnostic evaluation     Is this a Visco injection? NoProcedure Details  Location: knee - bilateral knee  Preparation: Patient was prepped and draped in the usual sterile fashion  Needle size: 22 G  Ultrasound guidance: no    Medications (Right): 2 mL bupivacaine 0.25 %; 80 mg triamcinolone acetonide 40 mg/mLMedications (Left): 2 mL bupivacaine 0.25 %; 80 mg triamcinolone acetonide 40 mg/mL   Patient tolerance: patient tolerated the procedure well with no immediate complications  Dressing:  Sterile dressing applied        -    Diagnostics, reviewed and taken today if performed as documented:  I have personally reviewed pertinent films in PACS and my interpretation is moderate patellofemoral and mild medial and lateral compartment osteoarthritis.        Scribe Attestation      I,:  Yuly Winkler am acting as a scribe while in the presence of the attending physician.:       I,:  Shaggy Mackay DO personally performed the services described in this documentation    as scribed in my presence.:             Portions of the record may have been created with voice recognition software.  Occasional wrong word or \"sound a like\" substitutions may have occurred due to the inherent limitations of voice recognition software.  Read the chart carefully " and recognize, using context, where substitutions have occurred.         [1]   Past Medical History:  Diagnosis Date    Abdominal pain     Anemia     Arthritis     Breast cyst     Colon polyp     GERD (gastroesophageal reflux disease)     GI disease     History of Helicobacter pylori infection     Iron deficiency     Iron deficiency anemia     Obesity (BMI 30-39.9) 4/25/2025    Pneumonia    [2]   Past Surgical History:  Procedure Laterality Date    CHOLECYSTECTOMY      COLONOSCOPY      MAMMO (HISTORICAL)  2018    TUBAL LIGATION      UPPER GASTROINTESTINAL ENDOSCOPY     [3]   Allergies  Allergen Reactions    Aspirin Palpitations   [4]   No current outpatient medications on file prior to visit.     No current facility-administered medications on file prior to visit.

## 2025-06-13 NOTE — ASSESSMENT & PLAN NOTE
Physical exam, diagnostic imaging, and subjective history are all most consistent with the above diagnosis. The pathoanatomy and natural history of this diagnosis was explained to the patient in detail.   X-ray obtained today and reviewed with the patient demonstrating moderate patellofemoral and mild medial and lateral compartment osteoarthritis  Patient was offered, accepted, and received a cortisone injection of the bilateral knee today. Patient tolerated procedure well with no immediate complications. Post-injection protocols and expectations were discussed with the patient.   Patient may call for VISCO as desired  Order placed today for Medrol Dose Shorty to be taken as prescribed if needed. Risks and side effects discussed today.   Apply Voltaren gel to painful area up to 4 times a day  May use ice or heat as needed for pain relief  May take NSAIDs/Tylenol as needed for pain control  Follow up after 8/15/25     Orders:    XR knee 3 vw left non injury; Future    XR knee 3 vw right non injury; Future    Large joint arthrocentesis: bilateral knee    Medial  Knee Brace     none

## 2025-06-13 NOTE — ASSESSMENT & PLAN NOTE
Physical exam, diagnostic imaging, and subjective history are all most consistent with the above diagnosis. The pathoanatomy and natural history of this diagnosis was explained to the patient in detail.   X-ray obtained today and reviewed with the patient demonstrating moderate patellofemoral and mild medial and lateral compartment osteoarthritis  Patient was offered, accepted, and received a cortisone injection of the bilateral knee today. Patient tolerated procedure well with no immediate complications. Post-injection protocols and expectations were discussed with the patient.   Patient may call for VISCO as desired  Order placed today for Medrol Dose Shorty to be taken as prescribed if needed. Risks and side effects discussed today.   Apply Voltaren gel to painful area up to 4 times a day  May use ice or heat as needed for pain relief  May take NSAIDs/Tylenol as needed for pain control  Follow up after 8/15/25     Orders:    XR knee 3 vw left non injury; Future    XR knee 3 vw right non injury; Future    Large joint arthrocentesis: bilateral knee    Medial  Knee Brace

## 2025-06-13 NOTE — LETTER
June 13, 2025     Patient: Gris Luque  YOB: 1970  Date of Visit: 6/13/2025      To Whom it May Concern:    Gris Luque is under my professional care. Gris was seen in my office on 6/13/2025. Gris may return to work on 6/14/2025.    If you have any questions or concerns, please don't hesitate to call.         Sincerely,          Shaggy Mackay DO        CC: No Recipients

## 2025-06-15 ENCOUNTER — APPOINTMENT (OUTPATIENT)
Dept: LAB | Facility: CLINIC | Age: 55
End: 2025-06-15
Payer: COMMERCIAL

## 2025-06-15 DIAGNOSIS — Z00.00 ANNUAL PHYSICAL EXAM: ICD-10-CM

## 2025-06-15 LAB
ALBUMIN SERPL BCG-MCNC: 4.2 G/DL (ref 3.5–5)
ALP SERPL-CCNC: 78 U/L (ref 34–104)
ALT SERPL W P-5'-P-CCNC: 29 U/L (ref 7–52)
ANION GAP SERPL CALCULATED.3IONS-SCNC: 10 MMOL/L (ref 4–13)
AST SERPL W P-5'-P-CCNC: 18 U/L (ref 13–39)
BACTERIA UR QL AUTO: ABNORMAL /HPF
BASOPHILS # BLD AUTO: 0.02 THOUSANDS/ÂΜL (ref 0–0.1)
BASOPHILS NFR BLD AUTO: 0 % (ref 0–1)
BILIRUB SERPL-MCNC: 0.43 MG/DL (ref 0.2–1)
BILIRUB UR QL STRIP: NEGATIVE
BUN SERPL-MCNC: 19 MG/DL (ref 5–25)
CALCIUM SERPL-MCNC: 8.8 MG/DL (ref 8.4–10.2)
CHLORIDE SERPL-SCNC: 109 MMOL/L (ref 96–108)
CHOLEST SERPL-MCNC: 167 MG/DL (ref ?–200)
CLARITY UR: CLEAR
CO2 SERPL-SCNC: 23 MMOL/L (ref 21–32)
COLOR UR: YELLOW
CREAT SERPL-MCNC: 0.54 MG/DL (ref 0.6–1.3)
EOSINOPHIL # BLD AUTO: 0 THOUSAND/ÂΜL (ref 0–0.61)
EOSINOPHIL NFR BLD AUTO: 0 % (ref 0–6)
ERYTHROCYTE [DISTWIDTH] IN BLOOD BY AUTOMATED COUNT: 14.3 % (ref 11.6–15.1)
GFR SERPL CREATININE-BSD FRML MDRD: 107 ML/MIN/1.73SQ M
GLUCOSE P FAST SERPL-MCNC: 108 MG/DL (ref 65–99)
GLUCOSE UR STRIP-MCNC: NEGATIVE MG/DL
HCT VFR BLD AUTO: 40.8 % (ref 34.8–46.1)
HDLC SERPL-MCNC: 68 MG/DL
HGB BLD-MCNC: 13.4 G/DL (ref 11.5–15.4)
HGB UR QL STRIP.AUTO: NEGATIVE
IMM GRANULOCYTES # BLD AUTO: 0.05 THOUSAND/UL (ref 0–0.2)
IMM GRANULOCYTES NFR BLD AUTO: 0 % (ref 0–2)
KETONES UR STRIP-MCNC: NEGATIVE MG/DL
LDLC SERPL CALC-MCNC: 87 MG/DL (ref 0–100)
LEUKOCYTE ESTERASE UR QL STRIP: ABNORMAL
LYMPHOCYTES # BLD AUTO: 1.72 THOUSANDS/ÂΜL (ref 0.6–4.47)
LYMPHOCYTES NFR BLD AUTO: 11 % (ref 14–44)
MCH RBC QN AUTO: 27.4 PG (ref 26.8–34.3)
MCHC RBC AUTO-ENTMCNC: 32.8 G/DL (ref 31.4–37.4)
MCV RBC AUTO: 83 FL (ref 82–98)
MONOCYTES # BLD AUTO: 0.34 THOUSAND/ÂΜL (ref 0.17–1.22)
MONOCYTES NFR BLD AUTO: 2 % (ref 4–12)
MUCOUS THREADS UR QL AUTO: ABNORMAL
NEUTROPHILS # BLD AUTO: 13.94 THOUSANDS/ÂΜL (ref 1.85–7.62)
NEUTS SEG NFR BLD AUTO: 87 % (ref 43–75)
NITRITE UR QL STRIP: NEGATIVE
NON-SQ EPI CELLS URNS QL MICRO: ABNORMAL /HPF
NONHDLC SERPL-MCNC: 99 MG/DL
NRBC BLD AUTO-RTO: 0 /100 WBCS
PH UR STRIP.AUTO: 6 [PH]
PLATELET # BLD AUTO: 362 THOUSANDS/UL (ref 149–390)
PMV BLD AUTO: 10.6 FL (ref 8.9–12.7)
POTASSIUM SERPL-SCNC: 3.7 MMOL/L (ref 3.5–5.3)
PROT SERPL-MCNC: 7.5 G/DL (ref 6.4–8.4)
PROT UR STRIP-MCNC: ABNORMAL MG/DL
RBC # BLD AUTO: 4.89 MILLION/UL (ref 3.81–5.12)
RBC #/AREA URNS AUTO: ABNORMAL /HPF
SODIUM SERPL-SCNC: 142 MMOL/L (ref 135–147)
SP GR UR STRIP.AUTO: 1.03 (ref 1–1.03)
TRIGL SERPL-MCNC: 59 MG/DL (ref ?–150)
TSH SERPL DL<=0.05 MIU/L-ACNC: 0.85 UIU/ML (ref 0.45–4.5)
UROBILINOGEN UR STRIP-ACNC: <2 MG/DL
WBC # BLD AUTO: 16.07 THOUSAND/UL (ref 4.31–10.16)
WBC #/AREA URNS AUTO: ABNORMAL /HPF

## 2025-06-15 PROCEDURE — 36415 COLL VENOUS BLD VENIPUNCTURE: CPT

## 2025-06-15 PROCEDURE — 81001 URINALYSIS AUTO W/SCOPE: CPT | Performed by: FAMILY MEDICINE

## 2025-06-15 PROCEDURE — 84443 ASSAY THYROID STIM HORMONE: CPT

## 2025-06-15 PROCEDURE — 80061 LIPID PANEL: CPT

## 2025-06-15 PROCEDURE — 85025 COMPLETE CBC W/AUTO DIFF WBC: CPT

## 2025-06-15 PROCEDURE — 80053 COMPREHEN METABOLIC PANEL: CPT

## 2025-06-16 ENCOUNTER — RESULTS FOLLOW-UP (OUTPATIENT)
Dept: FAMILY MEDICINE CLINIC | Facility: CLINIC | Age: 55
End: 2025-06-16